# Patient Record
Sex: FEMALE | Race: WHITE | NOT HISPANIC OR LATINO | Employment: PART TIME | URBAN - METROPOLITAN AREA
[De-identification: names, ages, dates, MRNs, and addresses within clinical notes are randomized per-mention and may not be internally consistent; named-entity substitution may affect disease eponyms.]

---

## 2017-01-06 ENCOUNTER — HOSPITAL ENCOUNTER (EMERGENCY)
Facility: CLINIC | Age: 37
Discharge: HOME | End: 2017-01-06
Attending: EMERGENCY MEDICINE | Admitting: EMERGENCY MEDICINE
Payer: COMMERCIAL

## 2017-01-06 ENCOUNTER — APPOINTMENT (OUTPATIENT)
Dept: ULTRASOUND IMAGING | Facility: CLINIC | Age: 37
End: 2017-01-06
Attending: EMERGENCY MEDICINE
Payer: COMMERCIAL

## 2017-01-06 VITALS
SYSTOLIC BLOOD PRESSURE: 101 MMHG | BODY MASS INDEX: 32.76 KG/M2 | DIASTOLIC BLOOD PRESSURE: 63 MMHG | HEART RATE: 63 BPM | OXYGEN SATURATION: 98 % | HEIGHT: 62 IN | WEIGHT: 178 LBS | RESPIRATION RATE: 16 BRPM | TEMPERATURE: 97.6 F

## 2017-01-06 DIAGNOSIS — R39.15 URINARY URGENCY: ICD-10-CM

## 2017-01-06 DIAGNOSIS — R10.2 PELVIC PAIN IN FEMALE: ICD-10-CM

## 2017-01-06 DIAGNOSIS — N88.8 NABOTHIAN CYST: ICD-10-CM

## 2017-01-06 LAB
ALBUMIN UR-MCNC: NEGATIVE MG/DL
ANION GAP SERPL CALCULATED.3IONS-SCNC: 7 MMOL/L (ref 3–14)
APPEARANCE UR: CLEAR
BACTERIA #/AREA URNS HPF: ABNORMAL /HPF
BASOPHILS # BLD AUTO: 0 10E9/L (ref 0–0.2)
BASOPHILS NFR BLD AUTO: 0.5 %
BILIRUB UR QL STRIP: NEGATIVE
BUN SERPL-MCNC: 11 MG/DL (ref 7–30)
CALCIUM SERPL-MCNC: 8.6 MG/DL (ref 8.5–10.1)
CHLORIDE SERPL-SCNC: 105 MMOL/L (ref 94–109)
CO2 SERPL-SCNC: 28 MMOL/L (ref 20–32)
COLOR UR AUTO: YELLOW
CREAT SERPL-MCNC: 0.62 MG/DL (ref 0.52–1.04)
DIFFERENTIAL METHOD BLD: NORMAL
EOSINOPHIL # BLD AUTO: 0.2 10E9/L (ref 0–0.7)
EOSINOPHIL NFR BLD AUTO: 2.8 %
ERYTHROCYTE [DISTWIDTH] IN BLOOD BY AUTOMATED COUNT: 12.2 % (ref 10–15)
GFR SERPL CREATININE-BSD FRML MDRD: ABNORMAL ML/MIN/1.7M2
GLUCOSE SERPL-MCNC: 113 MG/DL (ref 70–99)
GLUCOSE UR STRIP-MCNC: NEGATIVE MG/DL
HCG UR QL: NEGATIVE
HCT VFR BLD AUTO: 40.3 % (ref 35–47)
HGB BLD-MCNC: 13.5 G/DL (ref 11.7–15.7)
HGB UR QL STRIP: NEGATIVE
IMM GRANULOCYTES # BLD: 0 10E9/L (ref 0–0.4)
IMM GRANULOCYTES NFR BLD: 0.2 %
KETONES UR STRIP-MCNC: NEGATIVE MG/DL
LEUKOCYTE ESTERASE UR QL STRIP: NEGATIVE
LYMPHOCYTES # BLD AUTO: 2.2 10E9/L (ref 0.8–5.3)
LYMPHOCYTES NFR BLD AUTO: 25.6 %
MCH RBC QN AUTO: 30.1 PG (ref 26.5–33)
MCHC RBC AUTO-ENTMCNC: 33.5 G/DL (ref 31.5–36.5)
MCV RBC AUTO: 90 FL (ref 78–100)
MICRO REPORT STATUS: NORMAL
MONOCYTES # BLD AUTO: 0.6 10E9/L (ref 0–1.3)
MONOCYTES NFR BLD AUTO: 7 %
MUCOUS THREADS #/AREA URNS LPF: PRESENT /LPF
NEUTROPHILS # BLD AUTO: 5.5 10E9/L (ref 1.6–8.3)
NEUTROPHILS NFR BLD AUTO: 63.9 %
NITRATE UR QL: NEGATIVE
NRBC # BLD AUTO: 0 10*3/UL
NRBC BLD AUTO-RTO: 0 /100
PH UR STRIP: 5.5 PH (ref 5–7)
PLATELET # BLD AUTO: 288 10E9/L (ref 150–450)
POTASSIUM SERPL-SCNC: 3.7 MMOL/L (ref 3.4–5.3)
RBC # BLD AUTO: 4.49 10E12/L (ref 3.8–5.2)
RBC #/AREA URNS AUTO: <1 /HPF (ref 0–2)
SODIUM SERPL-SCNC: 140 MMOL/L (ref 133–144)
SP GR UR STRIP: 1.02 (ref 1–1.03)
SPECIMEN SOURCE: NORMAL
SQUAMOUS #/AREA URNS AUTO: 2 /HPF (ref 0–1)
URN SPEC COLLECT METH UR: ABNORMAL
UROBILINOGEN UR STRIP-MCNC: NORMAL MG/DL (ref 0–2)
WBC # BLD AUTO: 8.7 10E9/L (ref 4–11)
WBC #/AREA URNS AUTO: 1 /HPF (ref 0–2)
WET PREP SPEC: NORMAL

## 2017-01-06 PROCEDURE — 85025 COMPLETE CBC W/AUTO DIFF WBC: CPT | Performed by: EMERGENCY MEDICINE

## 2017-01-06 PROCEDURE — 80048 BASIC METABOLIC PNL TOTAL CA: CPT | Performed by: EMERGENCY MEDICINE

## 2017-01-06 PROCEDURE — 96374 THER/PROPH/DIAG INJ IV PUSH: CPT

## 2017-01-06 PROCEDURE — 96361 HYDRATE IV INFUSION ADD-ON: CPT

## 2017-01-06 PROCEDURE — 99284 EMERGENCY DEPT VISIT MOD MDM: CPT | Mod: 25

## 2017-01-06 PROCEDURE — 93976 VASCULAR STUDY: CPT | Mod: XS

## 2017-01-06 PROCEDURE — 25000128 H RX IP 250 OP 636: Performed by: EMERGENCY MEDICINE

## 2017-01-06 PROCEDURE — 25000125 ZZHC RX 250: Performed by: EMERGENCY MEDICINE

## 2017-01-06 PROCEDURE — 87210 SMEAR WET MOUNT SALINE/INK: CPT | Performed by: EMERGENCY MEDICINE

## 2017-01-06 PROCEDURE — 81025 URINE PREGNANCY TEST: CPT | Performed by: EMERGENCY MEDICINE

## 2017-01-06 PROCEDURE — 81001 URINALYSIS AUTO W/SCOPE: CPT | Performed by: EMERGENCY MEDICINE

## 2017-01-06 PROCEDURE — 87591 N.GONORRHOEAE DNA AMP PROB: CPT | Performed by: EMERGENCY MEDICINE

## 2017-01-06 PROCEDURE — 87491 CHLMYD TRACH DNA AMP PROBE: CPT | Performed by: EMERGENCY MEDICINE

## 2017-01-06 RX ORDER — LIDOCAINE 40 MG/G
CREAM TOPICAL
Status: DISCONTINUED | OUTPATIENT
Start: 2017-01-06 | End: 2017-01-06 | Stop reason: HOSPADM

## 2017-01-06 RX ORDER — KETOROLAC TROMETHAMINE 30 MG/ML
30 INJECTION, SOLUTION INTRAMUSCULAR; INTRAVENOUS ONCE
Status: COMPLETED | OUTPATIENT
Start: 2017-01-06 | End: 2017-01-06

## 2017-01-06 RX ADMIN — KETOROLAC TROMETHAMINE 30 MG: 30 INJECTION, SOLUTION INTRAMUSCULAR at 17:38

## 2017-01-06 RX ADMIN — SODIUM CHLORIDE 1000 ML: 9 INJECTION, SOLUTION INTRAVENOUS at 17:38

## 2017-01-06 ASSESSMENT — ENCOUNTER SYMPTOMS
NAUSEA: 1
CHILLS: 0
DYSURIA: 1
BACK PAIN: 1
COUGH: 0
DIARRHEA: 0
VOMITING: 0
FEVER: 0
CONSTIPATION: 1
ABDOMINAL PAIN: 1

## 2017-01-06 NOTE — ED AVS SNAPSHOT
[facility] Emergency Department    [address] [place] [address]    Phone:  [phone]    Fax:  [phone]                                       Amy Clemens   MRN: [id number]    Department:  [facility] Emergency Department   Date of Visit:  1/6/2017           Patient Information     Date Of Birth          1980        Your diagnoses for this visit were:     Pelvic pain in female     Urinary urgency     Nabothian cyst        You were seen by Ernesto Helton MD.      Follow-up Information     Follow up with your OB. Schedule an appointment as soon as possible for a visit in 3 days.    Why:  as scheduled        Discharge Instructions       Discharge Instructions  Abdominal Pain    Abdominal pain can be caused by many things. Your evaluation today does not show the exact cause for your pain. Your doctor today has decided that it is unlikely your pain is due to a life threatening problem, or a problem requiring surgery or hospital admission. Sometimes those problems cannot be found right away, so it is very important that you follow up as directed.  Sometimes only the changes which occur over time allow the cause of your pain to be found.    Return to the Emergency Department for a recheck in 8-12 hours if your pain continues.  If your pain gets worse, changes in location, or feels different, return to the Emergency Department right away.    ADULTS:  Return to the Emergency Department right away if:      You get an oral temperature above 102oF or as directed by your doctor.    You have blood in your stools (bright red or black, tarry stools).    You keep throwing up or can t drink liquids.    You see blood when you throw up.    You can t have a bowel movement or you can t pass gas.    Your stomach gets bloated or bigger.    Your skin or the whites of your eyes look yellow.    You faint.    You have bloody, frequent or painful urination.    You have new  symptoms or anything that worries you.    CHILDREN:  Return to the Emergency Department right away if your child has any of the above-listed symptoms or the following:      Pushes your hand away or screams/cries when his/her belly is touched.    You notice your child is very fussy or weak.    Your child is very tired and is too tired to eat or drink.    Your child is dehydrated.  Signs of dehydration can be:  o Your infant has had no wet diapers in 4-5 hours.  o Your older child has not passed urine in 6-8 hours.  o Your infant or child starts to have dry mouth and lips, or no saliva or tears.    PREGNANT WOMEN:  Return to the Emergency Department right away if you have any of the above-listed symptoms or the following:      You have bleeding, leaking fluid or passing tissue from the vagina.    You have worse pain or cramping, or pain in your shoulder or back.    You have vomiting that will not stop.    You have painful or bloody urination.    You have a temperature of 100oF or more.    Your baby is not moving as much as usual.    You faint.    You get a bad headache with or without eye problems and abdominal pain.    You have a convulsion or seizure.    You have unusual discharge from your vagina and abdominal pain.    Abdominal pain is pretty common during pregnancy.  Your pain may or may not be related to your pregnancy. You should follow-up closely with your OB doctor so they can evaluate you and your baby.  Until you follow-up with your regular doctor, do the following:       Avoid sex and do not put anything in your vagina.    Drink clear fluids.    Only take medications approved by your doctor.    MORE INFORMATION:    Appendicitis:  A possible cause of abdominal pain in any person who still has their appendix is acute appendicitis. Appendicitis is often hard to diagnose.  Testing does not always rule out early appendicitis or other causes of abdominal pain. Close follow-up with your doctor and re-evaluations  "may be needed to figure out the reason for your abdominal pain.    Follow-up:  It is very important that you make an appointment with your clinic and go to the appointment.  If you do not follow-up with your primary doctor, it may result in missing an important development which could result in permanent injury or disability and/or lasting pain.  If there is any problem keeping your appointment, call your doctor or return to the Emergency Department.    Medications:  Take your medications as directed by your doctor today.  Before using over-the-counter medications, ask your doctor and make sure to take the medications as directed.  If you have any questions about medications, ask your doctor.    Diet:  Resume your normal diet as much as possible, but do not eat fried, fatty or spicy foods while you have pain.  Do not drink alcohol or have caffeine.  Do not smoke tobacco.    Probiotics: If you have been given an antibiotic, you may want to also take a probiotic pill or eat yogurt with live cultures. Probiotics have \"good bacteria\" to help your intestines stay healthy. Studies have shown that probiotics help prevent diarrhea and other intestine problems (including C. diff infection) when you take antibiotics. You can buy these without a prescription in the pharmacy section of the store.     If you were given a prescription for medicine here today, be sure to read all of the information (including the package insert) that comes with your prescription.  This will include important information about the medicine, its side effects, and any warnings that you need to know about.  The pharmacist who fills the prescription can provide more information and answer questions you may have about the medicine.  If you have questions or concerns that the pharmacist cannot address, please call or return to the Emergency Department.         Opioid Medication Information    Pain medications are among the most commonly prescribed " medicines, so we are including this information for all our patients. If you did not receive pain medication or get a prescription for pain medicine, you can ignore it.     You may have been given a prescription for an opioid (narcotic) pain medicine and/or have received a pain medicine while here in the Emergency Department. These medicines can make you drowsy or impaired. You must not drive, operate dangerous equipment, or engage in any other dangerous activities while taking these medications. If you drive while taking these medications, you could be arrested for DUI, or driving under the influence. Do not drink any alcohol while you are taking these medications.     Opioid pain medications can cause addiction. If you have a history of chemical dependency of any type, you are at a higher risk of becoming addicted to pain medications.  Only take these prescribed medications to treat your pain when all other options have been tried. Take it for as short a time and as few doses as possible. Store your pain pills in a secure place, as they are frequently stolen and provide a dangerous opportunity for children or visitors in your house to start abusing these powerful medications. We will not replace any lost or stolen medicine.  As soon as your pain is better, you should flush all your remaining medication.     Many prescription pain medications contain Tylenol  (acetaminophen), including Vicodin , Tylenol #3 , Norco , Lortab , and Percocet .  You should not take any extra pills of Tylenol  if you are using these prescription medications or you can get very sick.  Do not ever take more than 3000 mg of acetaminophen in any 24 hour period.    All opioids tend to cause constipation. Drink plenty of water and eat foods that have a lot of fiber, such as fruits, vegetables, prune juice, apple juice and high fiber cereal.  Take a laxative if you don t move your bowels at least every other day. Miralax , Milk of Magnesia,  Colace , or Senna  can be used to keep you regular.      Remember that you can always come back to the Emergency Department if you are not able to see your regular doctor in the amount of time listed above, if you get any new symptoms, or if there is anything that worries you.          24 Hour Appointment Hotline       To make an appointment at any [facility], call [phone] ([phone]). If you don't have a family doctor or clinic, we will help you find one. [facility] are conveniently located to serve the needs of you and your family.             Review of your medicines      Our records show that you are taking the medicines listed below. If these are incorrect, please call your family doctor or clinic.        Dose / Directions Last dose taken    pantoprazole 40 MG EC tablet   Commonly known as:  PROTONIX   Dose:  40 mg        40 mg daily   Refills:  2                Procedures and tests performed during your visit     Basic metabolic panel    CBC with platelets differential    Chlamydia trachomatis PCR    HCG qualitative urine    NO Rho (D) immune globulin (RhoGam) needed - NOT obstetric patient    Neisseria gonorrhoea PCR    Peripheral IV: Standard    Prep for procedure - pelvic exam    UA with Microscopic    US Pelvic Complete w Transvaginal & Abd/Pel Duplex Limited    Vital signs    Wet prep      Orders Needing Specimen Collection     None      Pending Results     Date and Time Order Name Status Description    1/6/2017 1720 US Pelvic Complete w Transvaginal & Abd/Pel Duplex Limited Preliminary     1/6/2017 1720 Neisseria gonorrhoea PCR In process     1/6/2017 1720 Chlamydia trachomatis PCR In process             Pending Culture Results     Date and Time Order Name Status Description    1/6/2017 1720 Neisseria gonorrhoea PCR In process     1/6/2017 1720 Chlamydia trachomatis PCR In process        Test Results from your hospital stay           1/6/2017  5:24 PM - Interface, Flexilab Results       Component Results     Component Value Ref Range & Units Status    Color Urine Yellow  Final    Appearance Urine Clear  Final    Glucose Urine Negative NEG mg/dL Final    Bilirubin Urine Negative NEG Final    Ketones Urine Negative NEG mg/dL Final    Specific Gravity Urine 1.016 1.003 - 1.035 Final    Blood Urine Negative NEG Final    pH Urine 5.5 5.0 - 7.0 pH Final    Protein Albumin Urine Negative NEG mg/dL Final    Urobilinogen mg/dL Normal 0.0 - 2.0 mg/dL Final    Nitrite Urine Negative NEG Final    Leukocyte Esterase Urine Negative NEG Final    Source Midstream Urine  Final    WBC Urine 1 0 - 2 /HPF Final    RBC Urine <1 0 - 2 /HPF Final    Bacteria Urine Few (A) NEG /HPF Final    Squamous Epithelial /HPF Urine 2 (H) 0 - 1 /HPF Final    Mucous Urine Present (A) NEG /LPF Final         1/6/2017  5:20 PM - Interface, Flexilab Results      Component Results     Component Value Ref Range & Units Status    HCG Qual Urine Negative NEG Final         1/6/2017  6:36 PM - Interface, Flexilab Results      Component Results     Component    Specimen Description    Vagina    Wet Prep    No clue cells seen  No Trichomonas seen  No yeast seen  Few WBC'S seen      Micro Report Status    FINAL 01/06/2017 1/6/2017  6:30 PM - Interface, Flexilab Results         1/6/2017  6:30 PM - Interface, Flexilab Results         1/6/2017  5:46 PM - Interface, Flexilab Results      Component Results     Component Value Ref Range & Units Status    WBC 8.7 4.0 - 11.0 10e9/L Final    RBC Count 4.49 3.8 - 5.2 10e12/L Final    Hemoglobin 13.5 11.7 - 15.7 g/dL Final    Hematocrit 40.3 35.0 - 47.0 % Final    MCV 90 78 - 100 fl Final    MCH 30.1 26.5 - 33.0 pg Final    MCHC 33.5 31.5 - 36.5 g/dL Final    RDW 12.2 10.0 - 15.0 % Final    Platelet Count 288 150 - 450 10e9/L Final    Diff Method Automated Method  Final    % Neutrophils 63.9 % Final    % Lymphocytes 25.6 % Final    % Monocytes 7.0 % Final    % Eosinophils 2.8 % Final    %  Basophils 0.5 % Final    % Immature Granulocytes 0.2 % Final    Nucleated RBCs 0 0 /100 Final    Absolute Neutrophil 5.5 1.6 - 8.3 10e9/L Final    Absolute Lymphocytes 2.2 0.8 - 5.3 10e9/L Final    Absolute Monocytes 0.6 0.0 - 1.3 10e9/L Final    Absolute Eosinophils 0.2 0.0 - 0.7 10e9/L Final    Absolute Basophils 0.0 0.0 - 0.2 10e9/L Final    Abs Immature Granulocytes 0.0 0 - 0.4 10e9/L Final    Absolute Nucleated RBC 0.0  Final         1/6/2017  7:34 PM - Interface, Radiant Ib      Narrative     US PELVIS COMPLETE WITH TRANSVAGINAL AND DOPPLER LIMITED  1/6/2017  7:31 PM     HISTORY: Abdominal pain.    COMPARISON: None.    FINDINGS: Transvaginal and endovaginal scan due to inadequate bladder  fullness. The endometrium is 7 mm in thickness, which is normal.  Uterus measures 8.2 x 5.4 x 4.4 cm, which is normal. There is a 1.7 cm  nabothian cyst.    Right ovary appears normal with normal arterial and venous blood flow.    Left ovary appears normal with normal arterial and venous blood flow.    No free fluid in the cul-de-sac.    No adnexal masses.        Impression     IMPRESSION: Negative.         1/6/2017  6:40 PM - Interface, Flexilab Results      Component Results     Component Value Ref Range & Units Status    Sodium 140 133 - 144 mmol/L Final    Potassium 3.7 3.4 - 5.3 mmol/L Final    Chloride 105 94 - 109 mmol/L Final    Carbon Dioxide 28 20 - 32 mmol/L Final    Anion Gap 7 3 - 14 mmol/L Final    Glucose 113 (H) 70 - 99 mg/dL Final    Urea Nitrogen 11 7 - 30 mg/dL Final    Creatinine 0.62 0.52 - 1.04 mg/dL Final    GFR Estimate >90  Non  GFR Calc   >60 mL/min/1.7m2 Final    GFR Estimate If Black >90   GFR Calc   >60 mL/min/1.7m2 Final    Calcium 8.6 8.5 - 10.1 mg/dL Final                Clinical Quality Measure: Blood Pressure Screening     Your blood pressure was checked while you were in the emergency department today. The last reading we obtained was  BP: 100/57 mmHg . Please  "read the guidelines below about what these numbers mean and what you should do about them.  If your systolic blood pressure (the top number) is less than 120 and your diastolic blood pressure (the bottom number) is less than 80, then your blood pressure is normal. There is nothing more that you need to do about it.  If your systolic blood pressure (the top number) is 120-139 or your diastolic blood pressure (the bottom number) is 80-89, your blood pressure may be higher than it should be. You should have your blood pressure rechecked within a year by a primary care provider.  If your systolic blood pressure (the top number) is 140 or greater or your diastolic blood pressure (the bottom number) is 90 or greater, you may have high blood pressure. High blood pressure is treatable, but if left untreated over time it can put you at risk for heart attack, stroke, or kidney failure. You should have your blood pressure rechecked by a primary care provider within the next 4 weeks.  If your provider in the emergency department today gave you specific instructions to follow-up with your doctor or provider even sooner than that, you should follow that instruction and not wait for up to 4 weeks for your follow-up visit.        Thank you for choosing [facility]       Thank you for choosing [facility] for your care. Our goal is always to provide you with excellent care. Hearing back from our patients is one way we can continue to improve our services. Please take a few minutes to complete the written survey that you may receive in the mail after you visit with us. Thank you!        [employer] Information     [employer] lets you send messages to your doctor, view your test results, renew your prescriptions, schedule appointments and more. To sign up, go to [email or web address] . Click on \"Log in\" on the left side of the screen, which will take you to the Welcome page. Then click on \"Sign up Now\" on the right side of the page.     You " will be asked to enter the access code listed below, as well as some personal information. Please follow the directions to create your username and password.     Your access code is: [email or web address]  Expires: 2017  7:45 PM     Your access code will  in 90 days. If you need help or a new code, please call your [facility] clinic or [phone].        Care EveryWhere ID     This is your Care EveryWhere ID. This could be used by other organizations to access your [facility] medical records  [id number]        After Visit Summary       This is your record. Keep this with you and show to your community pharmacist(s) and doctor(s) at your next visit.

## 2017-01-06 NOTE — ED AVS SNAPSHOT
[facility] Emergency Department    [address]    [facility] [address]    Phone:  [phone]    Fax:  [phone]                                       Amy Clemens   MRN: [id number]    Department:  [facility] Emergency Department   Date of Visit:  1/6/2017           After Visit Summary Signature Page     I have received my discharge instructions, and my questions have been answered. I have discussed any challenges I see with this plan with the nurse or doctor.    ..........................................................................................................................................  Patient/Patient Representative Signature      ..........................................................................................................................................  Patient Representative Print Name and Relationship to Patient    ..................................................               ................................................  Date                                            Time    ..........................................................................................................................................  Reviewed by Signature/Title    ...................................................              ..............................................  Date                                                            Time

## 2017-01-06 NOTE — ED NOTES
Pelvic pain and burning since Monday.  Was seen at clinic on Wednesday.  Told she was constipated and urine was normal.  Has had BM since.  Increased pain.  ABCDs intact.

## 2017-01-06 NOTE — ED PROVIDER NOTES
History   Chief Complaint:  Pelvic pain    HPI   Amy Clemens is a 36 year old female with history of ovarian cysts who presents to the emergency department today for evaluation of pelvic pain. Ms. Clemens reports  pelvic pressure with urgency and burning similar to when she was pregnant for the last 4 days. No reported injuries or trauma, but notes her normal menstrual period ended the day prior. Of note, patient had similar symptoms back in February 2016, but states the pain is different from now and was diagnosed with an ovarian cyst. Patient went to clinic 2 days ago and underwent pelvic x-ray and urine analysis without undergoing a pelvic exam, but was discharged home after negative findings with suppositories. Today, she reports generalized abdominal pressure causing constipation and back pain.  She states the pain worsens when she cough, strains, or calls out. Due to continual pain, patient came in for further evaluation.  Here, patient notes she has been having normal bowel movements with last reported earlier today after taking suppositories, but continued to complain of constipation with nausea. She denies fever, chills, cough, diarrhea, or vaginal bleeding/discharge.     XR Abdomen Flat/.KUB 1 view from clinic on 1/4/17:  Moderate amount of stool in the right colon. Otherwise unremarkable.  Final reading per radiology.     UA result from 1/4/17: Blood small (A), Bacteria (A). WBC/HPF 0-2 RBC/HPF 0-2.     Allergies:  NKDA    Medications:   Pantoprazole (Protonix) 40 Mg Enteric Coated Tablet  Multiple Vitamins-Minerals (Womens Multi Po)  Vitamin D, Cholecalciferol, Po    Past Medical History:    Tubal occlusion   Ovarian cysts    Past Surgical History:    Cholecystectomy  Gyn surgery     Family History:    Hyperlipemia  Diabetes  Uterine cancer  Cerebrovascular disease     Social History:  Marital Status:   [2]  Tobacco: Negative  Alcohol: Negative  Presents alone.   PC[facility]: [facility]   Constitutional: Negative for fever and chills.   Respiratory: Negative for cough.    Gastrointestinal: Positive for nausea, abdominal pain and constipation. Negative for vomiting and diarrhea.   Genitourinary: Positive for dysuria, urgency and pelvic pain. Negative for vaginal bleeding and vaginal discharge.   Musculoskeletal: Positive for back pain.   All other systems reviewed and are negative.    Physical Exam   First Vitals:     BP: 136.68  Pulse: 96  Resp: 18  SpO2: 99 %   Temp: 97.6 F    Physical Exam  General: The patient is alert, in no respiratory distress.    HENT: Mucous membranes moist.    Cardiovascular: Regular rate and rhythm. Good pulses in all four extremities. Normal capillary refill and skin turgor.     Respiratory: Lungs are clear. No nasal flaring. No retractions. No wheezing, no crackles.    Gastrointestinal: Discomfort with palpitation across the abdomen causing suprapubic pressure.  No guarding, no rebound. No palpable hernias.     Genitourinary/Pelvic Exam: Small amount if discharge. No CMT.     Musculoskeletal: No gross deformity.     Skin: No rashes or petechiae.     Neurologic: The patient is alert and oriented x3. GCS 15. No testable cranial nerve deficit. Follows commands with clear and appropriate speech. Gives appropriate answers. Good strength in all extremities. No gross neurologic deficit. Gross sensation intact. Pupils are round and reactive. No meningismus.     Lymphatic: No cervical adenopathy. No lower extremity swelling.    Psychiatric: The patient is non-tearful.  Emergency Department Course   Imaging:  Radiographic findings were communicated with the patient who voiced understanding of the findings.    US Pelvic Complete with Transvaginal & Abdomen / Pelvis Duplex Limited:  Negative  Preliminary  reading per radiology.    Laboratory:  CBC:  WBC 8.7, HGB 13.5, , otherwise WNL  BMP: Glucose 113 (H) o/w WNL. (Creatinine 0.62)    UA: Bacteria Few (A), SE/HPF  2 (H), Mucous present (A). WBC/HPF 1, RBC/HPF <1.   Wet prep: Negative.   HCG Qualitative urine: Negative.  Chlamydia trachomatis PCR pending.   Neisseria gonorrhea PCR pending.     Interventions:  17:38 Toradol 30mg IV  17:38 Normal saline 1,000mL IV     Emergency Department Course:  16:54 Nursing notes and vitals reviewed. I performed an exam of the patient as documented above. GCS 15. I will perform a pelvic exam.     17:05 Urine collected.  This was sent to the lab for further testing, results above.    17:30 Blood drawn. This was sent to the lab for further testing, results above.    17:38 A peripheral IV was established.    17:43 Recheck patient. I performed a pelvic exam. Please refer to results above on my physical exam.     19:03 The patient was taken for ultrasound, see imaging results above.     19:40 Reheck patient. I personally reviewed the laboratory and imaigng results with the patient. Patient reports improvement in symptoms and will be discharged home.     Impression & Plan    Medical Decision Making:  Amy Clemens is a 36 year old female has been seen in February this year and complained of pelvic pain, but reports that actually was different and at that time was diagnosed with cyst. The patient states she coughs, strains, or yells, she has pain in pelvic region. It feels like a pressure. I did consider pelvic floor dysfunction, EUTI, kidney stone, intraabdominal causes such as hernia, ovarian cyst, pelvic infection, tumor and abscess. Ultrasound did demonstrate nabothian cyst which maybe a cause of this. There are no signs of UTI. She is not pregnant. She has follow up scheduled ion 3 days with OB days and I recommended that she take Ibuprofen until that time. I do have cultures that Aare pending are on her, but otherwise her labs are reassuring. She did not require CT imaging due to the low likelihood this could be surgical condition. She was discharged in good condition and feeling rather  comfortable.     Diagnosis:    ICD-10-CM    1. Pelvic pain in female R10.2    2. Urinary urgency R39.15    3. Nabothian cyst N88.8        Disposition:  discharged to home    Scribe Disclosure:  I, Keily Acosta, am serving as a scribe at 4:54 PM on 1/6/2017 to document services personally performed by Ernesto Helton MD, based on my observations and the provider's statements to me.  Keily Acosta  1/6/2017   [facility] EMERGENCY DEPARTMENT        Ernesto Helton MD  01/06/17 [id number]

## 2017-01-07 NOTE — DISCHARGE INSTRUCTIONS
Discharge Instructions  Abdominal Pain    Abdominal pain can be caused by many things. Your evaluation today does not show the exact cause for your pain. Your doctor today has decided that it is unlikely your pain is due to a life threatening problem, or a problem requiring surgery or hospital admission. Sometimes those problems cannot be found right away, so it is very important that you follow up as directed.  Sometimes only the changes which occur over time allow the cause of your pain to be found.    Return to the Emergency Department for a recheck in 8-12 hours if your pain continues.  If your pain gets worse, changes in location, or feels different, return to the Emergency Department right away.    ADULTS:  Return to the Emergency Department right away if:      You get an oral temperature above 102oF or as directed by your doctor.    You have blood in your stools (bright red or black, tarry stools).    You keep throwing up or can t drink liquids.    You see blood when you throw up.    You can t have a bowel movement or you can t pass gas.    Your stomach gets bloated or bigger.    Your skin or the whites of your eyes look yellow.    You faint.    You have bloody, frequent or painful urination.    You have new symptoms or anything that worries you.    CHILDREN:  Return to the Emergency Department right away if your child has any of the above-listed symptoms or the following:      Pushes your hand away or screams/cries when his/her belly is touched.    You notice your child is very fussy or weak.    Your child is very tired and is too tired to eat or drink.    Your child is dehydrated.  Signs of dehydration can be:  o Your infant has had no wet diapers in 4-5 hours.  o Your older child has not passed urine in 6-8 hours.  o Your infant or child starts to have dry mouth and lips, or no saliva or tears.    PREGNANT WOMEN:  Return to the Emergency Department right away if you have any of the above-listed symptoms or  the following:      You have bleeding, leaking fluid or passing tissue from the vagina.    You have worse pain or cramping, or pain in your shoulder or back.    You have vomiting that will not stop.    You have painful or bloody urination.    You have a temperature of 100oF or more.    Your baby is not moving as much as usual.    You faint.    You get a bad headache with or without eye problems and abdominal pain.    You have a convulsion or seizure.    You have unusual discharge from your vagina and abdominal pain.    Abdominal pain is pretty common during pregnancy.  Your pain may or may not be related to your pregnancy. You should follow-up closely with your OB doctor so they can evaluate you and your baby.  Until you follow-up with your regular doctor, do the following:       Avoid sex and do not put anything in your vagina.    Drink clear fluids.    Only take medications approved by your doctor.    MORE INFORMATION:    Appendicitis:  A possible cause of abdominal pain in any person who still has their appendix is acute appendicitis. Appendicitis is often hard to diagnose.  Testing does not always rule out early appendicitis or other causes of abdominal pain. Close follow-up with your doctor and re-evaluations may be needed to figure out the reason for your abdominal pain.    Follow-up:  It is very important that you make an appointment with your clinic and go to the appointment.  If you do not follow-up with your primary doctor, it may result in missing an important development which could result in permanent injury or disability and/or lasting pain.  If there is any problem keeping your appointment, call your doctor or return to the Emergency Department.    Medications:  Take your medications as directed by your doctor today.  Before using over-the-counter medications, ask your doctor and make sure to take the medications as directed.  If you have any questions about medications, ask your doctor.    Diet:   "Resume your normal diet as much as possible, but do not eat fried, fatty or spicy foods while you have pain.  Do not drink alcohol or have caffeine.  Do not smoke tobacco.    Probiotics: If you have been given an antibiotic, you may want to also take a probiotic pill or eat yogurt with live cultures. Probiotics have \"good bacteria\" to help your intestines stay healthy. Studies have shown that probiotics help prevent diarrhea and other intestine problems (including C. diff infection) when you take antibiotics. You can buy these without a prescription in the pharmacy section of the store.     If you were given a prescription for medicine here today, be sure to read all of the information (including the package insert) that comes with your prescription.  This will include important information about the medicine, its side effects, and any warnings that you need to know about.  The pharmacist who fills the prescription can provide more information and answer questions you may have about the medicine.  If you have questions or concerns that the pharmacist cannot address, please call or return to the Emergency Department.         Opioid Medication Information    Pain medications are among the most commonly prescribed medicines, so we are including this information for all our patients. If you did not receive pain medication or get a prescription for pain medicine, you can ignore it.     You may have been given a prescription for an opioid (narcotic) pain medicine and/or have received a pain medicine while here in the Emergency Department. These medicines can make you drowsy or impaired. You must not drive, operate dangerous equipment, or engage in any other dangerous activities while taking these medications. If you drive while taking these medications, you could be arrested for DUI, or driving under the influence. Do not drink any alcohol while you are taking these medications.     Opioid pain medications can cause " addiction. If you have a history of chemical dependency of any type, you are at a higher risk of becoming addicted to pain medications.  Only take these prescribed medications to treat your pain when all other options have been tried. Take it for as short a time and as few doses as possible. Store your pain pills in a secure place, as they are frequently stolen and provide a dangerous opportunity for children or visitors in your house to start abusing these powerful medications. We will not replace any lost or stolen medicine.  As soon as your pain is better, you should flush all your remaining medication.     Many prescription pain medications contain Tylenol  (acetaminophen), including Vicodin , Tylenol #3 , Norco , Lortab , and Percocet .  You should not take any extra pills of Tylenol  if you are using these prescription medications or you can get very sick.  Do not ever take more than 3000 mg of acetaminophen in any 24 hour period.    All opioids tend to cause constipation. Drink plenty of water and eat foods that have a lot of fiber, such as fruits, vegetables, prune juice, apple juice and high fiber cereal.  Take a laxative if you don t move your bowels at least every other day. Miralax , Milk of Magnesia, Colace , or Senna  can be used to keep you regular.      Remember that you can always come back to the Emergency Department if you are not able to see your regular doctor in the amount of time listed above, if you get any new symptoms, or if there is anything that worries you.

## 2017-01-08 LAB
C TRACH DNA SPEC QL NAA+PROBE: NORMAL
N GONORRHOEA DNA SPEC QL NAA+PROBE: NORMAL
SPECIMEN SOURCE: NORMAL
SPECIMEN SOURCE: NORMAL

## 2018-03-11 ENCOUNTER — HOSPITAL ENCOUNTER (EMERGENCY)
Facility: CLINIC | Age: 38
Discharge: HOME | End: 2018-03-11
Attending: EMERGENCY MEDICINE | Admitting: EMERGENCY MEDICINE
Payer: COMMERCIAL

## 2018-03-11 ENCOUNTER — APPOINTMENT (OUTPATIENT)
Dept: GENERAL RADIOLOGY | Facility: CLINIC | Age: 38
End: 2018-03-11
Attending: EMERGENCY MEDICINE
Payer: COMMERCIAL

## 2018-03-11 VITALS
TEMPERATURE: 98.2 F | BODY MASS INDEX: 32.59 KG/M2 | HEIGHT: 64 IN | DIASTOLIC BLOOD PRESSURE: 79 MMHG | OXYGEN SATURATION: 99 % | RESPIRATION RATE: 18 BRPM | WEIGHT: 190.87 LBS | SYSTOLIC BLOOD PRESSURE: 129 MMHG

## 2018-03-11 DIAGNOSIS — R13.10 PAIN WITH SWALLOWING: ICD-10-CM

## 2018-03-11 DIAGNOSIS — R07.9 ACUTE CHEST PAIN: ICD-10-CM

## 2018-03-11 LAB
ALBUMIN SERPL-MCNC: 3.6 G/DL (ref 3.4–5)
ALP SERPL-CCNC: 80 U/L (ref 40–150)
ALT SERPL W P-5'-P-CCNC: 27 U/L (ref 0–50)
ANION GAP SERPL CALCULATED.3IONS-SCNC: 5 MMOL/L (ref 3–14)
AST SERPL W P-5'-P-CCNC: 14 U/L (ref 0–45)
BASOPHILS # BLD AUTO: 0 10E9/L (ref 0–0.2)
BASOPHILS NFR BLD AUTO: 0.5 %
BILIRUB DIRECT SERPL-MCNC: <0.1 MG/DL (ref 0–0.2)
BILIRUB SERPL-MCNC: 0.2 MG/DL (ref 0.2–1.3)
BUN SERPL-MCNC: 10 MG/DL (ref 7–30)
CALCIUM SERPL-MCNC: 8.7 MG/DL (ref 8.5–10.1)
CHLORIDE SERPL-SCNC: 105 MMOL/L (ref 94–109)
CO2 SERPL-SCNC: 29 MMOL/L (ref 20–32)
CREAT SERPL-MCNC: 0.64 MG/DL (ref 0.52–1.04)
DIFFERENTIAL METHOD BLD: NORMAL
EOSINOPHIL # BLD AUTO: 0.3 10E9/L (ref 0–0.7)
EOSINOPHIL NFR BLD AUTO: 3.6 %
ERYTHROCYTE [DISTWIDTH] IN BLOOD BY AUTOMATED COUNT: 12.3 % (ref 10–15)
GFR SERPL CREATININE-BSD FRML MDRD: >90 ML/MIN/1.7M2
GLUCOSE SERPL-MCNC: 141 MG/DL (ref 70–99)
HCT VFR BLD AUTO: 39.4 % (ref 35–47)
HGB BLD-MCNC: 13.2 G/DL (ref 11.7–15.7)
IMM GRANULOCYTES # BLD: 0 10E9/L (ref 0–0.4)
IMM GRANULOCYTES NFR BLD: 0.3 %
LIPASE SERPL-CCNC: 164 U/L (ref 73–393)
LYMPHOCYTES # BLD AUTO: 2.1 10E9/L (ref 0.8–5.3)
LYMPHOCYTES NFR BLD AUTO: 28.7 %
MCH RBC QN AUTO: 30.1 PG (ref 26.5–33)
MCHC RBC AUTO-ENTMCNC: 33.5 G/DL (ref 31.5–36.5)
MCV RBC AUTO: 90 FL (ref 78–100)
MONOCYTES # BLD AUTO: 0.5 10E9/L (ref 0–1.3)
MONOCYTES NFR BLD AUTO: 6 %
NEUTROPHILS # BLD AUTO: 4.5 10E9/L (ref 1.6–8.3)
NEUTROPHILS NFR BLD AUTO: 60.9 %
NRBC # BLD AUTO: 0 10*3/UL
NRBC BLD AUTO-RTO: 0 /100
PLATELET # BLD AUTO: 269 10E9/L (ref 150–450)
POTASSIUM SERPL-SCNC: 3.8 MMOL/L (ref 3.4–5.3)
PROT SERPL-MCNC: 7 G/DL (ref 6.8–8.8)
RBC # BLD AUTO: 4.39 10E12/L (ref 3.8–5.2)
SODIUM SERPL-SCNC: 139 MMOL/L (ref 133–144)
TROPONIN I SERPL-MCNC: <0.015 UG/L (ref 0–0.04)
WBC # BLD AUTO: 7.5 10E9/L (ref 4–11)

## 2018-03-11 PROCEDURE — 85025 COMPLETE CBC W/AUTO DIFF WBC: CPT | Performed by: EMERGENCY MEDICINE

## 2018-03-11 PROCEDURE — 80048 BASIC METABOLIC PNL TOTAL CA: CPT | Performed by: EMERGENCY MEDICINE

## 2018-03-11 PROCEDURE — 83690 ASSAY OF LIPASE: CPT | Performed by: EMERGENCY MEDICINE

## 2018-03-11 PROCEDURE — 80076 HEPATIC FUNCTION PANEL: CPT | Performed by: EMERGENCY MEDICINE

## 2018-03-11 PROCEDURE — 93005 ELECTROCARDIOGRAM TRACING: CPT

## 2018-03-11 PROCEDURE — 71046 X-RAY EXAM CHEST 2 VIEWS: CPT

## 2018-03-11 PROCEDURE — 99285 EMERGENCY DEPT VISIT HI MDM: CPT

## 2018-03-11 PROCEDURE — 84484 ASSAY OF TROPONIN QUANT: CPT | Performed by: EMERGENCY MEDICINE

## 2018-03-11 RX ORDER — CALCIUM CARBONATE 500 MG/1
1 TABLET, CHEWABLE ORAL DAILY
COMMUNITY
End: 2018-11-22

## 2018-03-11 NOTE — ED AVS SNAPSHOT
[facility] Emergency Department    [address]    [facility] [address]    Phone:  [phone]    Fax:  [phone]                                       Amy Clemens   MRN: [id number]    Department:  [facility] Emergency Department   Date of Visit:  3/11/2018           After Visit Summary Signature Page     I have received my discharge instructions, and my questions have been answered. I have discussed any challenges I see with this plan with the nurse or doctor.    ..........................................................................................................................................  Patient/Patient Representative Signature      ..........................................................................................................................................  Patient Representative Print Name and Relationship to Patient    ..................................................               ................................................  Date                                            Time    ..........................................................................................................................................  Reviewed by Signature/Title    ...................................................              ..............................................  Date                                                            Time

## 2018-03-11 NOTE — ED AVS SNAPSHOT
[facility] Emergency Department    [address]    [place] [address]    Phone:  [phone]    Fax:  [phone]                                       Amy Clemens   MRN: [id number]    Department:  [facility] Emergency Department   Date of Visit:  3/11/2018           Patient Information     Date Of Birth          1980        Your diagnoses for this visit were:     Acute chest pain     Pain with swallowing        You were seen by Magda Interiano MD.      Follow-up Information     Follow up with Your GI doctor.        Follow up with [facility] Emergency Department.    Specialty:  EMERGENCY MEDICINE    Why:  If symptoms worsen    Contact information:    [address]  [facility] [address] [phone]        Discharge Instructions       Eat a soft diet.  Follow up with your GI doctor.  Discuss symptoms and ask about an upper endoscopy.  Return for severe pain, inability to swallow, drooling, abdominal pain or fevers.  Continue your protonix.   Use tums and maalox as needed.    24 Hour Appointment Hotline       To make an appointment at any [facility] clinic, call [phone] ([phone]). If you don't have a family doctor or clinic, we will help you find one. [facility] clinics are conveniently located to serve the needs of you and your family.             Review of your medicines      Our records show that you are taking the medicines listed below. If these are incorrect, please call your family doctor or clinic.        Dose / Directions Last dose taken    ASPIRIN PO        Refills:  0        calcium carbonate 500 MG chewable tablet   Commonly known as:  TUMS   Dose:  1 chew tab        Take 1 chew tab by mouth daily   Refills:  0        pantoprazole 40 MG EC tablet   Commonly known as:  PROTONIX   Dose:  40 mg        40 mg daily   Refills:  2                Procedures and tests performed during your visit     Basic  metabolic panel    CBC with platelets differential    Hepatic panel    Lipase    Troponin I    XR Chest 2 Views      Orders Needing Specimen Collection     None      Pending Results     No orders found from 3/9/2018 to 3/12/2018.            Pending Culture Results     No orders found from 3/9/2018 to 3/12/2018.            Pending Results Instructions     If you had any lab results that were not finalized at the time of your Discharge, you can call the ED Lab Result RN at [phone]. You will be contacted by this team for any positive Lab results or changes in treatment. The nurses are available 7 days a week from 10A to 6:30P.  You can leave a message 24 hours per day and they will return your call.        Test Results From Your Hospital Stay        3/11/2018  9:07 PM      Component Results     Component Value Ref Range & Units Status    WBC 7.5 4.0 - 11.0 10e9/L Final    RBC Count 4.39 3.8 - 5.2 10e12/L Final    Hemoglobin 13.2 11.7 - 15.7 g/dL Final    Hematocrit 39.4 35.0 - 47.0 % Final    MCV 90 78 - 100 fl Final    MCH 30.1 26.5 - 33.0 pg Final    MCHC 33.5 31.5 - 36.5 g/dL Final    RDW 12.3 10.0 - 15.0 % Final    Platelet Count 269 150 - 450 10e9/L Final    Diff Method Automated Method  Final    % Neutrophils 60.9 % Final    % Lymphocytes 28.7 % Final    % Monocytes 6.0 % Final    % Eosinophils 3.6 % Final    % Basophils 0.5 % Final    % Immature Granulocytes 0.3 % Final    Nucleated RBCs 0 0 /100 Final    Absolute Neutrophil 4.5 1.6 - 8.3 10e9/L Final    Absolute Lymphocytes 2.1 0.8 - 5.3 10e9/L Final    Absolute Monocytes 0.5 0.0 - 1.3 10e9/L Final    Absolute Eosinophils 0.3 0.0 - 0.7 10e9/L Final    Absolute Basophils 0.0 0.0 - 0.2 10e9/L Final    Abs Immature Granulocytes 0.0 0 - 0.4 10e9/L Final    Absolute Nucleated RBC 0.0  Final         3/11/2018  9:29 PM      Component Results     Component Value Ref Range & Units Status    Sodium 139 133 - 144 mmol/L Final    Potassium 3.8 3.4 - 5.3 mmol/L Final     Chloride 105 94 - 109 mmol/L Final    Carbon Dioxide 29 20 - 32 mmol/L Final    Anion Gap 5 3 - 14 mmol/L Final    Glucose 141 (H) 70 - 99 mg/dL Final    Urea Nitrogen 10 7 - 30 mg/dL Final    Creatinine 0.64 0.52 - 1.04 mg/dL Final    GFR Estimate >90 >60 mL/min/1.7m2 Final    Non  GFR Calc    GFR Estimate If Black >90 >60 mL/min/1.7m2 Final    African American GFR Calc    Calcium 8.7 8.5 - 10.1 mg/dL Final         3/11/2018  9:29 PM      Component Results     Component Value Ref Range & Units Status    Troponin I ES <0.015 0.000 - 0.045 ug/L Final    The 99th percentile for upper reference range is 0.045 ug/L.  Troponin values   in the range of 0.045 - 0.120 ug/L may be associated with risks of adverse   clinical events.           3/11/2018  9:30 PM      Narrative     CHEST TWO VIEW   3/11/2018 9:16 PM     HISTORY: Chest pain.     COMPARISON: 1/20/2014    FINDINGS: Heart size normal. Lungs clear. No interval change.        Impression     IMPRESSION: Negative.    KASHIF WELLER MD         3/11/2018  9:56 PM      Component Results     Component Value Ref Range & Units Status    Bilirubin Direct <0.1 0.0 - 0.2 mg/dL Final    Bilirubin Total 0.2 0.2 - 1.3 mg/dL Final    Albumin 3.6 3.4 - 5.0 g/dL Final    Protein Total 7.0 6.8 - 8.8 g/dL Final    Alkaline Phosphatase 80 40 - 150 U/L Final    ALT 27 0 - 50 U/L Final    AST 14 0 - 45 U/L Final         3/11/2018  9:44 PM      Component Results     Component Value Ref Range & Units Status    Lipase 164 73 - 393 U/L Final                Clinical Quality Measure: Blood Pressure Screening     Your blood pressure was checked while you were in the emergency department today. The last reading we obtained was  BP: (!) 102/95 . Please read the guidelines below about what these numbers mean and what you should do about them.  If your systolic blood pressure (the top number) is less than 120 and your diastolic blood pressure (the bottom number) is less than 80,  "then your blood pressure is normal. There is nothing more that you need to do about it.  If your systolic blood pressure (the top number) is 120-139 or your diastolic blood pressure (the bottom number) is 80-89, your blood pressure may be higher than it should be. You should have your blood pressure rechecked within a year by a primary care provider.  If your systolic blood pressure (the top number) is 140 or greater or your diastolic blood pressure (the bottom number) is 90 or greater, you may have high blood pressure. High blood pressure is treatable, but if left untreated over time it can put you at risk for heart attack, stroke, or kidney failure. You should have your blood pressure rechecked by a primary care provider within the next 4 weeks.  If your provider in the emergency department today gave you specific instructions to follow-up with your doctor or provider even sooner than that, you should follow that instruction and not wait for up to 4 weeks for your follow-up visit.        Thank you for choosing [facility]       Thank you for choosing [facility] for your care. Our goal is always to provide you with excellent care. Hearing back from our patients is one way we can continue to improve our services. Please take a few minutes to complete the written survey that you may receive in the mail after you visit with us. Thank you!        [facility] Information     [employer]ufind lets you send messages to your doctor, view your test results, renew your prescriptions, schedule appointments and more. To sign up, go to [email or web address] . Click on \"Log in\" on the left side of the screen, which will take you to the Welcome page. Then click on \"Sign up Now\" on the right side of the page.     You will be asked to enter the access code listed below, as well as some personal information. Please follow the directions to create your username and password.     Your access code is: [email or web address]  Expires: 6/9/2018 10:23 PM     Your " access code will  in 90 days. If you need help or a new code, please call your [place] clinic or [phone].        Care EveryWhere ID     This is your Care EveryWhere ID. This could be used by other organizations to access your [place] medical records  [id number]        Equal Access to Services     YUMIKO ACEVEDO : Eulogio vailo Sonunu, waaxda luqadaha, qaybta kaalmada tawanna, xiao sweet. So [facility] [phone].    ATENCIÓN: Si habla español, tiene a meade disposición servicios gratuitos de asistencia lingüística. Llame al [phone].    We comply with applicable federal civil rights laws and [place] laws. We do not discriminate on the basis of race, color, national origin, age, disability, sex, sexual orientation, or gender identity.            After Visit Summary       This is your record. Keep this with you and show to your community pharmacist(s) and doctor(s) at your next visit.

## 2018-03-12 LAB — INTERPRETATION ECG - MUSE: NORMAL

## 2018-03-12 ASSESSMENT — ENCOUNTER SYMPTOMS
HEMATURIA: 1
FEVER: 0
VOMITING: 1
CHOKING: 0
BLOOD IN STOOL: 0
COUGH: 0
ABDOMINAL PAIN: 0
SHORTNESS OF BREATH: 0

## 2018-03-12 NOTE — ED PROVIDER NOTES
History     Chief Complaint:  Gastrophageal Reflux     HPI   Amy Clemens is a 38 year old female who presents with chest pain.  The patient began having chest pain at 0700 today, and the patient explains that the pain feels like something is stuck in her esophagus, and there is pain when the patient's chest is pressed on.  The patient did say that she vomited today, but denies any belly pain, acid reflux pain, episodes of choking, fever, cough, congestion, history of blood clots, shortness of breath, blood in stool or urine.  The patient says that she has never had an episode like this.  She is able to swallow and is tolerating both solids and liquids.  She takes protonix daily and is compliant.      CARDIAC RISK FACTORS:  Sex:    Female  Tobacco:   No  Hypertension:   No  Hyperlipidemia:  No  Diabetes:   No  Family History:  No    PE/DVT RISK FACTORS:  Sex:    Female  Hormones:   No  Tobacco:   No  Cancer:   No  Travel:   No  Surgery:   No  Other immobilization: No  Personal history:  No  Family history:  No        Allergies:  No known drug allergies    Medications:    Tums  Aspirin  Protonix     Past Medical History:    Gastroesophageal reflux disease  Tubal occlusion    Past Surgical History:    Cholecystectomy  Gyn Surgery    Family History:    History reviewed. No pertinent family history.      Social History:  Smoking Status: Never Smoker  Alcohol Use: No  Patient presents   Marital Status:        Review of Systems   Constitutional: Negative for fever.   HENT: Negative for congestion.    Respiratory: Negative for cough, choking and shortness of breath.    Cardiovascular: Positive for chest pain.   Gastrointestinal: Positive for vomiting. Negative for abdominal pain and blood in stool.   Genitourinary: Positive for hematuria.   All other systems reviewed and are negative.      Physical Exam   First Vitals:    Patient Vitals for the past 24 hrs:   BP Temp Heart Rate Resp SpO2 Height Weight  "  03/11/18 2230 - - - - 99 % - -   03/11/18 2215 129/79 - - - 100 % - -   03/11/18 2200 118/80 - - - 99 % - -   03/11/18 2145 (!) 102/95 - - - 100 % - -   03/11/18 2033 132/65 98.2  F (36.8  C) 85 18 98 % 1.626 m (5' 4\") 86.6 kg (190 lb 14 oz)     Physical Exam  Gen: alert  HEENT: PERRL, oropharynx clear  Neck: normal ROM  CV: RRR, no murmurs, 2+ distal pulses in all 4 extremities  Chest: no tenderness over the chest wall  Pulm: breath sounds equal, lungs clear  Abd: Soft, nontender  Back: no evidence of injury  MSK: no lower extremity edema, no calf tenderness  Skin: no rash  Neuro: alert, appropriate conversation and interaction    Emergency Department Course     ECG (20:24:37):  Rate 73 bpm. WY interval 174. QRS duration 86. QT/QTc 384/423. P-R-T axes 49 49 41. normal sinus rhythm. Normal ECG. Interpreted at 2114 by Dr. Interiano.    Imaging:  Radiographic findings were communicated with the patient who voiced understanding of the findings.     X-ray Chest, 2 views:  Negative  Result per radiology.    Laboratory:  CBC: WNL (WBC 7.5, HGB 13.2, )  BMP: Glucose 141 (H) all others WNL (Creatinine 0.64)  Troponin I <0.015  Hepatic Panel All WNL  Lipase 164    Emergency Department Course:  Past medical records, nursing notes, and vitals reviewed.  2211: I performed an exam of the patient and obtained history, as documented above.   IV inserted and blood drawn.     The patient was sent for a X-ray while in the emergency department, findings above.     2222: I rechecked the patient. Findings and plan explained to the Patient. Patient discharged home with instructions regarding supportive care, medications, and reasons to return. The importance of close follow-up was reviewed.      Impression & Plan      Medical Decision Making:  The patient presented with chest pain.  The EKG showed normal sinus rhythm without ischemic changes.  There was no evidence of acute ischemia.  Initial troponin was negative.  CXR showed no " acute processes to explain the patient's chest pain.  History and exam was not consistent with PE or thoracic aortic dissection.  Laboratory studies are reviewed above.  There was no evidence of a referred intraabdominal process.  Based on age, the characteristics of the pain and cardiac risk factors, the I believe the pain is most consistent with GERD. I do not believe there is an acute underlying cardiac process involving the patient's visit here tonight. I will have her follow up with GI and we discussed strict return precautions. Patient verbalized understanding and agreement with the plan.    Diagnosis:    ICD-10-CM    1. Acute chest pain R07.9    2. Pain with swallowing R13.10        Disposition:  discharged to home    Nathaniel Huang  3/11/2018   [facility] EMERGENCY DEPARTMENT  I, Nathaniel Huang, am serving as a scribe at 10:11 PM on 3/11/2018 to document services personally performed by Magda Interiano based on my observations and the provider's statements to me.        Magda Interiano MD  03/12/18 7600

## 2018-03-12 NOTE — ED NOTES
"Awoke this am with \"Lump in throat\" feeling mid sternal. Pt attempted ASA, tums, self induced vomitting, and ate 2 bananas without relief. ABC in tact. A/OX4.   "

## 2018-03-12 NOTE — DISCHARGE INSTRUCTIONS
Eat a soft diet.  Follow up with your GI doctor.  Discuss symptoms and ask about an upper endoscopy.  Return for severe pain, inability to swallow, drooling, abdominal pain or fevers.  Continue your protonix.   Use tums and maalox as needed.

## 2018-10-08 ENCOUNTER — APPOINTMENT (OUTPATIENT)
Dept: GENERAL RADIOLOGY | Facility: CLINIC | Age: 38
End: 2018-10-08
Attending: EMERGENCY MEDICINE
Payer: COMMERCIAL

## 2018-10-08 ENCOUNTER — HOSPITAL ENCOUNTER (EMERGENCY)
Facility: CLINIC | Age: 38
Discharge: HOME | End: 2018-10-08
Attending: EMERGENCY MEDICINE | Admitting: EMERGENCY MEDICINE
Payer: COMMERCIAL

## 2018-10-08 VITALS
HEIGHT: 62 IN | DIASTOLIC BLOOD PRESSURE: 77 MMHG | HEART RATE: 65 BPM | SYSTOLIC BLOOD PRESSURE: 123 MMHG | TEMPERATURE: 98.1 F | OXYGEN SATURATION: 100 % | BODY MASS INDEX: 33.68 KG/M2 | RESPIRATION RATE: 18 BRPM | WEIGHT: 183 LBS

## 2018-10-08 DIAGNOSIS — S20.212A CONTUSION OF RIB ON LEFT SIDE, INITIAL ENCOUNTER: ICD-10-CM

## 2018-10-08 PROCEDURE — 99283 EMERGENCY DEPT VISIT LOW MDM: CPT

## 2018-10-08 PROCEDURE — 25000132 ZZH RX MED GY IP 250 OP 250 PS 637: Performed by: EMERGENCY MEDICINE

## 2018-10-08 PROCEDURE — 71046 X-RAY EXAM CHEST 2 VIEWS: CPT

## 2018-10-08 RX ORDER — LIDOCAINE 50 MG/G
1 PATCH TOPICAL EVERY 24 HOURS
Qty: 10 PATCH | Refills: 0 | Status: SHIPPED | OUTPATIENT
Start: 2018-10-08 | End: 2019-02-01

## 2018-10-08 RX ORDER — HYDROCODONE BITARTRATE AND ACETAMINOPHEN 5; 325 MG/1; MG/1
1 TABLET ORAL EVERY 6 HOURS PRN
Qty: 8 TABLET | Refills: 0 | Status: SHIPPED | OUTPATIENT
Start: 2018-10-08 | End: 2018-11-22

## 2018-10-08 RX ORDER — LIDOCAINE 4 G/G
1 PATCH TOPICAL ONCE
Status: DISCONTINUED | OUTPATIENT
Start: 2018-10-08 | End: 2018-10-08 | Stop reason: HOSPADM

## 2018-10-08 RX ADMIN — LIDOCAINE 1 PATCH: 560 PATCH PERCUTANEOUS; TOPICAL; TRANSDERMAL at 10:49

## 2018-10-08 ASSESSMENT — ENCOUNTER SYMPTOMS
CONSTIPATION: 0
FEVER: 0
FATIGUE: 1
NAUSEA: 0
APPETITE CHANGE: 1
CHILLS: 0
DIAPHORESIS: 0
VOMITING: 0

## 2018-10-08 NOTE — DISCHARGE INSTRUCTIONS
Please continue to take deep breaths and cough, use incentive spirometry as well.  Avoid splinting on the side of injury.  If you notice increasing pain, cough productive of sputum, fevers, pain elsewhere or other acute changes return to the ED.      May use norco for breakthrough pain but do not drive, drink or operate heavy machinery.    Discharge Instructions  Chest Injury    You have been seen today because of a chest injury.  You may have contusion (bruise) of the chest or a rib fracture (broken bone).  Rib fractures can be hard to see on x-ray, so we cannot always be sure whether your rib is broken or bruised. Fortunately, the treatment of these injuries is usually the same, and includes pain control and preventing complications.    Generally, every Emergency Department visit should have a follow-up clinic visit with either a primary or a specialty clinic/provider. Please follow-up as instructed by your emergency provider today.    Return to the Emergency Department if:    You become short of breath.    You develop a fever over 101.5 F.    You pass out or become very weak or pale.    You have abdominal (belly) pain that is new or increasing.    You cough up blood.    You have new symptoms or anything that worries you.    Follow-up with your provider:    As directed by your provider today.    If you are not improved in two weeks.    If you need more pain medicine, since we do not refill pain pills through the Emergency Department.    Home care instructions:    Chest injuries can be painful.  You may take an over-the-counter pain medication such as Tylenol  (acetaminophen), Advil  (ibuprofen), Motrin  (ibuprofen) or Aleve  (naproxen).    Applying ice packs to the painful area can help your pain.     Holding a pillow against your chest can help with pain when you need to move or cough.    You may need to rest and avoid lifting particularly in the first few days after your injury.    Prevention of pneumonia (lung  infection) is also a part of managing chest injuries.  Because it can hurt to take deep breaths, you could develop collapsed areas of lung that can develop infection.  To prevent this, you need to take ten very deep breaths every hour while you are awake. Sometimes you will be given a device called an incentive spirometer to help with this. You also need to make yourself cough every hour.    Rib belts or binders are not generally recommended, since they may increase the risk of pneumonia. If you do use one, use it for only short periods of time.   If you were given a prescription for medicine here today, be sure to read all of the information (including the package insert) that comes with your prescription.  This will include important information about the medicine, its side effects, and any warnings that you need to know about.  The pharmacist who fills the prescription can provide more information and answer questions you may have about the medicine.  If you have questions or concerns that the pharmacist cannot address, please call or return to the Emergency Department.   Remember that you can always come back to the Emergency Department if you are not able to see your regular provider in the amount of time listed above, if you get any new symptoms, or if there is anything that worries you.

## 2018-10-08 NOTE — ED AVS SNAPSHOT
[facility] Emergency Department    [address]    [facility] [address]    Phone:  [phone]    Fax:  [phone]                                       Amy Clemens   MRN: [id number]    Department:  [facility] Emergency Department   Date of Visit:  10/8/2018           After Visit Summary Signature Page     I have received my discharge instructions, and my questions have been answered. I have discussed any challenges I see with this plan with the nurse or doctor.    ..........................................................................................................................................  Patient/Patient Representative Signature      ..........................................................................................................................................  Patient Representative Print Name and Relationship to Patient    ..................................................               ................................................  Date                                   Time    ..........................................................................................................................................  Reviewed by Signature/Title    ...................................................              ..............................................  Date                                               Time          22EPIC Rev 08/18

## 2018-10-08 NOTE — ED AVS SNAPSHOT
[facility]    [address] [place] [address]    Phone:  [phone]    Fax:  [phone]                                       Amy Clemens   MRN: [id number]    Department:  [facility]   Date of Visit:  10/8/2018           Patient Information     Date Of Birth          1980        Your diagnoses for this visit were:     Contusion of rib on left side, initial encounter        You were seen by Treasure Moreno MD.      Follow-up Information     Follow up with Jemima Melgar. Schedule an appointment as soon as possible for a visit in 3 days.    Specialty:  Family Practice    Contact information:    [facility] [place]  [address]   [place] [address]  [phone]          Discharge Instructions       Please continue to take deep breaths and cough, use incentive spirometry as well.  Avoid splinting on the side of injury.  If you notice increasing pain, cough productive of sputum, fevers, pain elsewhere or other acute changes return to the ED.      May use norco for breakthrough pain but do not drive, drink or operate heavy machinery.    Discharge Instructions  Chest Injury    You have been seen today because of a chest injury.  You may have contusion (bruise) of the chest or a rib fracture (broken bone).  Rib fractures can be hard to see on x-ray, so we cannot always be sure whether your rib is broken or bruised. Fortunately, the treatment of these injuries is usually the same, and includes pain control and preventing complications.    Generally, every Emergency Department visit should have a follow-up clinic visit with either a primary or a specialty clinic/provider. Please follow-up as instructed by your emergency provider today.    Return to the Emergency Department if:    You become short of breath.    You develop a fever over 101.5 F.    You pass out or become very weak or pale.    You have abdominal  (belly) pain that is new or increasing.    You cough up blood.    You have new symptoms or anything that worries you.    Follow-up with your provider:    As directed by your provider today.    If you are not improved in two weeks.    If you need more pain medicine, since we do not refill pain pills through the Emergency Department.    Home care instructions:    Chest injuries can be painful.  You may take an over-the-counter pain medication such as Tylenol  (acetaminophen), Advil  (ibuprofen), Motrin  (ibuprofen) or Aleve  (naproxen).    Applying ice packs to the painful area can help your pain.     Holding a pillow against your chest can help with pain when you need to move or cough.    You may need to rest and avoid lifting particularly in the first few days after your injury.    Prevention of pneumonia (lung infection) is also a part of managing chest injuries.  Because it can hurt to take deep breaths, you could develop collapsed areas of lung that can develop infection.  To prevent this, you need to take ten very deep breaths every hour while you are awake. Sometimes you will be given a device called an incentive spirometer to help with this. You also need to make yourself cough every hour.    Rib belts or binders are not generally recommended, since they may increase the risk of pneumonia. If you do use one, use it for only short periods of time.   If you were given a prescription for medicine here today, be sure to read all of the information (including the package insert) that comes with your prescription.  This will include important information about the medicine, its side effects, and any warnings that you need to know about.  The pharmacist who fills the prescription can provide more information and answer questions you may have about the medicine.  If you have questions or concerns that the pharmacist cannot address, please call or return to the Emergency Department.   Remember that you can always come  back to the Emergency Department if you are not able to see your regular provider in the amount of time listed above, if you get any new symptoms, or if there is anything that worries you.      24 Hour Appointment Hotline       To make an appointment at any [facility], call [phone] ([phone]). If you don't have a family doctor or clinic, we will help you find one. [place] clinics are conveniently located to serve the needs of you and your family.             Review of your medicines      START taking        Dose / Directions Last dose taken    HYDROcodone-acetaminophen 5-325 MG per tablet   Commonly known as:  NORCO   Dose:  1 tablet   Quantity:  8 tablet        Take 1 tablet by mouth every 6 hours as needed for severe pain   Refills:  0        lidocaine 5 % Patch   Commonly known as:  LIDODERM   Dose:  1 patch   Quantity:  10 patch        Place 1 patch onto the skin every 24 hours for 10 days   Refills:  0          Our records show that you are taking the medicines listed below. If these are incorrect, please call your family doctor or clinic.        Dose / Directions Last dose taken    ASPIRIN PO        Refills:  0        calcium carbonate 500 MG chewable tablet   Commonly known as:  TUMS   Dose:  1 chew tab        Take 1 chew tab by mouth daily   Refills:  0        pantoprazole 40 MG EC tablet   Commonly known as:  PROTONIX   Dose:  40 mg        40 mg daily   Refills:  2                Information about OPIOIDS     PRESCRIPTION OPIOIDS: WHAT YOU NEED TO KNOW   We gave you an opioid (narcotic) pain medicine. It is important to manage your pain, but opioids are not always the best choice. You should first try all the other options your care team gave you. Take this medicine for as short a time (and as few doses) as possible.    Some activities can increase your pain, such as bandage changes or therapy sessions. It may help to take your pain medicine 30 to 60 minutes before these activities.  Reduce your stress by getting enough sleep, working on hobbies you enjoy and practicing relaxation or meditation. Talk to your care team about ways to manage your pain beyond prescription opioids.    These medicines have risks:    DO NOT drive when on new or higher doses of pain medicine. These medicines can affect your alertness and reaction times, and you could be arrested for driving under the influence (DUI). If you need to use opioids long-term, talk to your care team about driving.    DO NOT operate heavy machinery    DO NOT do any other dangerous activities while taking these medicines.    DO NOT drink any alcohol while taking these medicines.     If the opioid prescribed includes acetaminophen, DO NOT take with any other medicines that contain acetaminophen. Read all labels carefully. Look for the word  acetaminophen  or  Tylenol.  Ask your pharmacist if you have questions or are unsure.    You can get addicted to pain medicines, especially if you have a history of addiction (chemical, alcohol or substance dependence). Talk to your care team about ways to reduce this risk.    All opioids tend to cause constipation. Drink plenty of water and eat foods that have a lot of fiber, such as fruits, vegetables, prune juice, apple juice and high-fiber cereal. Take a laxative (Miralax, milk of magnesia, Colace, Senna) if you don t move your bowels at least every other day. Other side effects include upset stomach, sleepiness, dizziness, throwing up, tolerance (needing more of the medicine to have the same effect), physical dependence and slowed breathing.    Store your pills in a secure place, locked if possible. We will not replace any lost or stolen medicine. If you don t finish your medicine, please throw away (dispose) as directed by your pharmacist. The [place] [employer]ution [employer] has more information about safe disposal: https://www.pca.[facility].[place].us/living-green/managing-unwanted-medications         Prescriptions were sent or printed at these locations (2 Prescriptions)                   Other Prescriptions                Printed at Department/Unit printer (2 of 2)         HYDROcodone-acetaminophen (NORCO) 5-325 MG per tablet               lidocaine (LIDODERM) 5 % Patch                Procedures and tests performed during your visit     XR Chest 2 Views      Orders Needing Specimen Collection     None      Pending Results     No orders found from 10/6/2018 to 10/9/2018.            Pending Culture Results     No orders found from 10/6/2018 to 10/9/2018.            Pending Results Instructions     If you had any lab results that were not finalized at the time of your Discharge, you can call the ED Lab Result RN at [phone]. You will be contacted by this team for any positive Lab results or changes in treatment. The nurses are available 7 days a week from 10A to 6:30P.  You can leave a message 24 hours per day and they will return your call.        Test Results From Your Hospital Stay        10/8/2018 10:24 AM      Narrative     CHEST TWO VIEWS  10/8/2018 9:58 AM     HISTORY:  Fall and left rib pain.     COMPARISON: 3/11/2018        Impression     IMPRESSION: Lungs are well inflated and clear. No evidence of pleural  effusion or pneumothorax. Heart size is normal. No displaced rib  fractures are identified.    SAMPSON ELIZABETH MD                Clinical Quality Measure: Blood Pressure Screening     Your blood pressure was checked while you were in the emergency department today. The last reading we obtained was  BP: 123/77 . Please read the guidelines below about what these numbers mean and what you should do about them.  If your systolic blood pressure (the top number) is less than 120 and your diastolic blood pressure (the bottom number) is less than 80, then your blood pressure is normal. There is nothing more that you need to do about it.  If your systolic blood pressure (the top number) is 120-139 or  "your diastolic blood pressure (the bottom number) is 80-89, your blood pressure may be higher than it should be. You should have your blood pressure rechecked within a year by a primary care provider.  If your systolic blood pressure (the top number) is 140 or greater or your diastolic blood pressure (the bottom number) is 90 or greater, you may have high blood pressure. High blood pressure is treatable, but if left untreated over time it can put you at risk for heart attack, stroke, or kidney failure. You should have your blood pressure rechecked by a primary care provider within the next 4 weeks.  If your provider in the emergency department today gave you specific instructions to follow-up with your doctor or provider even sooner than that, you should follow that instruction and not wait for up to 4 weeks for your follow-up visit.        Thank you for choosing [facility]       Thank you for choosing [facility] for your care. Our goal is always to provide you with excellent care. Hearing back from our patients is one way we can continue to improve our services. Please take a few minutes to complete the written survey that you may receive in the mail after you visit with us. Thank you!        [employer] Information     [employer] lets you send messages to your doctor, view your test results, renew your prescriptions, schedule appointments and more. To sign up, go to [email or web address] . Click on \"Log in\" on the left side of the screen, which will take you to the Welcome page. Then click on \"Sign up Now\" on the right side of the page.     You will be asked to enter the access code listed below, as well as some personal information. Please follow the directions to create your username and password.     Your access code is: [email or web address]  Expires: 2019 11:20 AM     Your access code will  in 90 days. If you need help or a new code, please call your [facility] clinic or [phone].        Care EveryWhere ID     This " is your Care EveryWhere ID. This could be used by other organizations to access your [place] medical records  [id number]        Equal Access to Services     YUMIKO ACEVEDO : Eulogio Herrera, miles peck, klarissa stacy, xiao sweet. So [facility] [phone].    ATENCIÓN: Si habla español, tiene a meade disposición servicios gratuitos de asistencia lingüística. Llame al [phone].    We comply with applicable federal civil rights laws and [place] laws. We do not discriminate on the basis of race, color, national origin, age, disability, sex, sexual orientation, or gender identity.            After Visit Summary       This is your record. Keep this with you and show to your community pharmacist(s) and doctor(s) at your next visit.

## 2018-10-08 NOTE — ED PROVIDER NOTES
"  History     Chief Complaint:  Fall and Left sided chest pain     HPI   Amy Clemens is a 38 year old female who presents to the emergency department today for evaluation of fall and left sided chest pain. Patient notes she fell in bathtub yesterday while cleaning top of the tub when she slipped and fell on her left side on the side of the tub. Patient notes decreasing discomfort in her left arm and leg as she has been taking Naproxen twice a day. However, patient highlights persistent left sided chest pain that is exacerbated by deep breathing, heavy lifting, or ambulating. Patient does note diminished appetite and fatigue since the fall. Patient denies any fever, chills, diaphoresis, nausea, emesis, new constipation, or history of blood thinners.     Allergies:  No Known Drug Allergies    Medications:    No current medications     Past Medical History:    GERD  Tubal occulusion     Past Surgical History:    Cholecystectomy  Gyn surgery     Family History:    Maternal grandfather: Hyperlipidemia, diabetes  Maternal grandmother: diabetes, hyperlipidemia, diabetes  Paternal grandfather: Cerebrovascular disease, diabetes   Paternal aunt: Uterine cancer     Social History:  The patient was accompanied to the ED by daughter.  Smoking Status: Never Smoker  Smokeless Tobacco: Never Used  Alcohol Use: Negative  Marital Status:      Review of Systems   Constitutional: Positive for appetite change (diminished) and fatigue. Negative for chills, diaphoresis and fever.   Cardiovascular: Positive for chest pain.   Gastrointestinal: Negative for constipation, nausea and vomiting.   All other systems reviewed and are negative.    Physical Exam     Patient Vitals for the past 24 hrs:   BP Temp Temp src Pulse Resp SpO2 Height Weight   10/08/18 0935 123/77 98.1  F (36.7  C) Temporal 65 18 100 % 1.575 m (5' 2\") 83 kg (183 lb)     Physical Exam  General: Resting on the bed.  Head: No obvious trauma to head.  Ears, Nose, Throat:  " External ears normal.  Nose normal.    Eyes:  Conjunctivae clear.  Pupils are equal, round, and reactive.   Neck: Normal range of motion.  Neck supple.   CV: Regular rate and rhythm.  No murmurs.      Respiratory: Effort normal and breath sounds normal.  No wheezing or crackles.   Gastrointestinal: Soft.  No distension. There is no tenderness.    Musculoskeletal: Normal range of motion.  Non tender extremities to palpations.  Tender to left anterior costal margin.    Skin: Skin is warm and dry.  No rash noted. Ecchymosis over the left forearm and left hip.  Small ecchymosis to the left costal margin.      Emergency Department Course     Imaging:  Radiology findings were communicated with the patient who voiced understanding of the findings.    XR Chest 2 Views  Lungs are well inflated and clear. No evidence of pleural  effusion or pneumothorax. Heart size is normal. No displaced rib  fractures are identified.  SAMPSON ELIZABETH MD  Reading per radiology    Interventions:  1049 Lidocaine 4% 1 patch Transdermal     Emergency Department Course:    0952 The patient was sent for a x-ray while in the emergency department, results above.     1001 Nursing notes and vitals reviewed.    1035 I performed an exam of the patient as documented above.     1130 Recheck and update.     I personally reviewed the imaging results with the patient and answered all related questions prior to discharge.    Impression & Plan      Medical Decision Making:  Amy Clemens is a 38-year-old female otherwise healthy presents after mechanical fall.  Vital signs are unremarkable.  Broad differential pursued including not limited to fracture dislocation, rib contusion, pneumothorax, pneumonia, chest wall discomfort, etc.  Overall patient's well-appearing nontoxic.  Pain was all post fall.  She has ecchymosis over the left forearm, left hip and left costal margin.  Her remainder of exam is nonfocal.  There is no bony tenderness to palpation of the left  upper left lower extremity.  No pelvis instability.  Without tenderness on examination of the left upper or left lower extremity, I did not feel that x-rays are warranted of these areas.  She is moving them freely.  No suggestion of fracture or dislocation.  X-ray of the chest shows no evidence of acute pneumonia, pneumothorax or rib fracture.  Abdomen is soft benign without any issues.  Overall suspect most likely rib contusion versus possible small occult fracture.  Advised supportive cares, incentive spirometry, close follow-up.  Strict return precautions were discussed in regards to possible future development pneumonias with sputum production, bloody cough, etc.  She voiced understanding plan was discharged in stable improved condition.     Diagnosis:    ICD-10-CM   1. Contusion of rib on left side, initial encounter S20.212A     Disposition:   The patient is discharged to home.    Discharge Medications:  Discharge Medication List as of 10/8/2018 11:20 AM      START taking these medications    Details   HYDROcodone-acetaminophen (NORCO) 5-325 MG per tablet Take 1 tablet by mouth every 6 hours as needed for severe pain, Disp-8 tablet, R-0, Local Print      lidocaine (LIDODERM) 5 % Patch Place 1 patch onto the skin every 24 hours for 10 daysDisp-10 patch, R-0Local Print           Scribe Disclosure:  I, Chriss Gann, am serving as a scribe at 10:02 AM on 10/8/2018 to document services personally performed by Treasure Moreno MD based on my observations and the provider's statements to me.    [facility] EMERGENCY DEPARTMENT       Treasure Moreno MD  10/08/18 5719

## 2018-11-22 ENCOUNTER — HOSPITAL ENCOUNTER (EMERGENCY)
Facility: CLINIC | Age: 38
Discharge: HOME | End: 2018-11-22
Attending: EMERGENCY MEDICINE | Admitting: EMERGENCY MEDICINE
Payer: COMMERCIAL

## 2018-11-22 ENCOUNTER — APPOINTMENT (OUTPATIENT)
Dept: ULTRASOUND IMAGING | Facility: CLINIC | Age: 38
End: 2018-11-22
Attending: EMERGENCY MEDICINE
Payer: COMMERCIAL

## 2018-11-22 VITALS
TEMPERATURE: 97.5 F | BODY MASS INDEX: 34.02 KG/M2 | RESPIRATION RATE: 16 BRPM | WEIGHT: 186 LBS | DIASTOLIC BLOOD PRESSURE: 63 MMHG | OXYGEN SATURATION: 97 % | HEART RATE: 88 BPM | SYSTOLIC BLOOD PRESSURE: 109 MMHG

## 2018-11-22 DIAGNOSIS — R30.0 DYSURIA: ICD-10-CM

## 2018-11-22 DIAGNOSIS — R10.2 PELVIC PAIN IN FEMALE: ICD-10-CM

## 2018-11-22 LAB
ALBUMIN UR-MCNC: NEGATIVE MG/DL
ALBUMIN UR-MCNC: NEGATIVE MG/DL
APPEARANCE UR: CLEAR
APPEARANCE UR: CLEAR
BACTERIA #/AREA URNS HPF: ABNORMAL /HPF
BILIRUB UR QL STRIP: NEGATIVE
BILIRUB UR QL STRIP: NEGATIVE
COLOR UR AUTO: ABNORMAL
COLOR UR AUTO: ABNORMAL
GLUCOSE UR STRIP-MCNC: NEGATIVE MG/DL
GLUCOSE UR STRIP-MCNC: NEGATIVE MG/DL
HCG UR QL: NEGATIVE
HGB UR QL STRIP: ABNORMAL
HGB UR QL STRIP: ABNORMAL
KETONES UR STRIP-MCNC: NEGATIVE MG/DL
KETONES UR STRIP-MCNC: NEGATIVE MG/DL
LEUKOCYTE ESTERASE UR QL STRIP: ABNORMAL
LEUKOCYTE ESTERASE UR QL STRIP: NEGATIVE
MUCOUS THREADS #/AREA URNS LPF: PRESENT /LPF
MUCOUS THREADS #/AREA URNS LPF: PRESENT /LPF
NITRATE UR QL: POSITIVE
NITRATE UR QL: POSITIVE
PH UR STRIP: 5 PH (ref 5–7)
PH UR STRIP: 5 PH (ref 5–7)
RBC #/AREA URNS AUTO: 3 /HPF (ref 0–2)
RBC #/AREA URNS AUTO: 9 /HPF (ref 0–2)
SOURCE: ABNORMAL
SOURCE: ABNORMAL
SP GR UR STRIP: 1.01 (ref 1–1.03)
SP GR UR STRIP: 1.02 (ref 1–1.03)
SPECIMEN SOURCE: NORMAL
SQUAMOUS #/AREA URNS AUTO: 1 /HPF (ref 0–1)
SQUAMOUS #/AREA URNS AUTO: 6 /HPF (ref 0–1)
UROBILINOGEN UR STRIP-MCNC: 0 MG/DL (ref 0–2)
UROBILINOGEN UR STRIP-MCNC: 2 MG/DL (ref 0–2)
WBC #/AREA URNS AUTO: 2 /HPF (ref 0–5)
WBC #/AREA URNS AUTO: 2 /HPF (ref 0–5)
WET PREP SPEC: NORMAL

## 2018-11-22 PROCEDURE — 87591 N.GONORRHOEAE DNA AMP PROB: CPT | Performed by: EMERGENCY MEDICINE

## 2018-11-22 PROCEDURE — 87210 SMEAR WET MOUNT SALINE/INK: CPT | Performed by: EMERGENCY MEDICINE

## 2018-11-22 PROCEDURE — 76856 US EXAM PELVIC COMPLETE: CPT

## 2018-11-22 PROCEDURE — 96372 THER/PROPH/DIAG INJ SC/IM: CPT

## 2018-11-22 PROCEDURE — 81001 URINALYSIS AUTO W/SCOPE: CPT | Performed by: EMERGENCY MEDICINE

## 2018-11-22 PROCEDURE — 81025 URINE PREGNANCY TEST: CPT | Performed by: EMERGENCY MEDICINE

## 2018-11-22 PROCEDURE — 87086 URINE CULTURE/COLONY COUNT: CPT | Performed by: EMERGENCY MEDICINE

## 2018-11-22 PROCEDURE — 99285 EMERGENCY DEPT VISIT HI MDM: CPT | Mod: 25

## 2018-11-22 PROCEDURE — 87491 CHLMYD TRACH DNA AMP PROBE: CPT | Performed by: EMERGENCY MEDICINE

## 2018-11-22 PROCEDURE — 25000128 H RX IP 250 OP 636: Performed by: EMERGENCY MEDICINE

## 2018-11-22 RX ORDER — PHENAZOPYRIDINE HYDROCHLORIDE 200 MG/1
200 TABLET, FILM COATED ORAL 3 TIMES DAILY
Qty: 9 TABLET | Refills: 0 | Status: SHIPPED | OUTPATIENT
Start: 2018-11-22 | End: 2019-02-01

## 2018-11-22 RX ORDER — KETOROLAC TROMETHAMINE 15 MG/ML
15 INJECTION, SOLUTION INTRAMUSCULAR; INTRAVENOUS ONCE
Status: COMPLETED | OUTPATIENT
Start: 2018-11-22 | End: 2018-11-22

## 2018-11-22 RX ORDER — IBUPROFEN 600 MG/1
600 TABLET, FILM COATED ORAL EVERY 6 HOURS PRN
Qty: 40 TABLET | Refills: 0 | Status: SHIPPED | OUTPATIENT
Start: 2018-11-22 | End: 2019-02-01

## 2018-11-22 RX ADMIN — KETOROLAC TROMETHAMINE 15 MG: 15 INJECTION, SOLUTION INTRAMUSCULAR; INTRAVENOUS at 06:54

## 2018-11-22 ASSESSMENT — ENCOUNTER SYMPTOMS
NAUSEA: 1
VOMITING: 0
FEVER: 0
DYSURIA: 1
DIARRHEA: 0

## 2018-11-22 NOTE — ED AVS SNAPSHOT
[facility] Emergency Department    [address]    [facility] [address]    Phone:  [phone]    Fax:  [phone]                                       Amy Clemens   MRN: [id number]    Department:  [facility] Emergency Department   Date of Visit:  11/22/2018           After Visit Summary Signature Page     I have received my discharge instructions, and my questions have been answered. I have discussed any challenges I see with this plan with the nurse or doctor.    ..........................................................................................................................................  Patient/Patient Representative Signature      ..........................................................................................................................................  Patient Representative Print Name and Relationship to Patient    ..................................................               ................................................  Date                                   Time    ..........................................................................................................................................  Reviewed by Signature/Title    ...................................................              ..............................................  Date                                               Time          22EPIC Rev 08/18

## 2018-11-22 NOTE — DISCHARGE INSTRUCTIONS
Urine culture is pending. You will be called with positive results only.  Gonorrhea and Chlamydia are pending. You will be called with positive results only.  Ultrasound is normal today, but keep appointment on the 3rd to discuss ongoing symptoms.  Try pyridium again for burning with urination.   Take Motrin or Tylenol for pain.  Return with severe pain, fever >101F, or ANY OTHER CONCERNS.

## 2018-11-22 NOTE — ED PROVIDER NOTES
"  History     Chief Complaint:  Pelvic Pain    The history is provided by the patient.      Amy Clemens is an otherwise healthy 38 year old female who presents for evaluation of pelvic pain. Patient had vaginal discharge, dysuria, pelvic pain, and dyspareunia starting around 10/10/18. She was seen by primary care provider who referred to OB. After testing with these 2 providers including pelvic ultrasound (results below) and urine culture, patient was empirically treated for PID despite negative Chlamydia and gonorrhea. Patient had improvement in symptoms for a few days and was seen in follow up by OB. She then had her usual menses at the beginning of this month and the symptoms all started again. She was treated for vulvovaginal candidiasis, but symptoms persisted. She notes at one point she was given Pyridium for dysuria (3 doses) and this did improve this symptom. However, patient presents today because her symptoms have persisted including dysuria, brownish vaginal discharge, and pelvic pain. She is very concerned because she is to have a follow up ultrasound and does not believe she can \"wait that long.\" She describes pelvic pain as intermittent pressure and notes it has been particularly bad since yesterday afternoon at intensity of 8/10 with some radiation toward her lower back. She has no improvement with aspirin or Midol. Patient has had some nausea without vomiting or diarrhea. She has had no flank pain or fever. She is sexually active with a single male partner.    US Pelvic Complete 10/23/2018:   IMPRESSION:   1. Slightly heterogeneous appearance of the left adnexa most likely within normal limits. Recommend follow-up ultrasound in 6-8 weeks.  2. Heterogeneous appearance of the endometrium with blood product. This is nonspecific. In the absence of other intervention, recommend attention at follow-up ultrasound, as per radiology.     Allergies:  NKDA    Medications:    Aspirin  Pantoprazole    Past " Medical History:    GERD  Tubal occlusion    Past Surgical History:    Cholecystectomy  GYN surgery    Family History:    No past pertinent family history.    Social History:  Marital Status:    Presents with daughter.   Negative for tobacco use.  Negative for alcohol use.  Engages in intercourse with one male partner.      Review of Systems   Constitutional: Negative for fever.   Gastrointestinal: Positive for nausea. Negative for abdominal pain, diarrhea and vomiting.   Genitourinary: Positive for dyspareunia, dysuria, pelvic pain, vaginal discharge (brown) and vaginal pain. Negative for flank pain.   All other systems reviewed and are negative.      Physical Exam     Patient Vitals for the past 24 hrs:   BP Temp Temp src Pulse Heart Rate Resp SpO2 Weight   11/22/18 0823 109/63 - - - 74 16 97 % -   11/22/18 0616 119/82 97.5  F (36.4  C) Temporal 88 88 18 99 % 84.4 kg (186 lb)     Physical Exam  General: Well-developed and well-nourished. Well appearing young  woman. Cooperative.  Head:  Atraumatic.  Eyes:  Conjunctivae, lids, and sclerae are normal.  ENT:    Normal nose. Moist mucous membranes.  Neck:  Supple. Normal range of motion.  CV:  Regular rate and rhythm. Normal heart sounds with no murmurs, rubs, or gallops detected.  Resp:  No respiratory distress. Clear to auscultation bilaterally without decreased breath sounds, wheezing, rales, or rhonchi.  GI:  Soft. Non-distended. Non-tender.   :  Small amount of brown mucoid discharge in the vaginal canal without active bleeding. No CMT. No adnexal masses or tenderness. No uterine tenderness. Cervix firm and os closed.  MS:  Normal ROM.   Skin:  Warm. Non-diaphoretic. No pallor.  Neuro:  Awake. A&Ox3. Normal strength.  Psych: Normal mood and affect. Normal speech. Intermittently tearful.  Vitals reviewed.    Emergency Department Course   Imaging:  Radiographic findings were communicated with the patient who voiced understanding of the  findings.    US Pelvic, Complete w Transvaginal & Abd/Pel Duplex Limited:  No acute abnormality, as per radiology.     Laboratory:  UA with Microscopic (clean catch): Blood: Large, Nitrite: Positive (A), Leukocyte esterase: Trace (A), RBC: 3 (H), Bacteria: Few (A), Squamous epithelial: 6 (H), Mucous: Present (A), o/w WNL    UA with Microscopic (with straight catheter): Blood: Small (A), Nitrite: Positive (A), RBC: 9 (H), Mucous: Present (A), o/w WNL    Urine Culture: Pending    HCG (urine): Negative    Wet prep: No trichomonas, clue cells, or yeast seen. Few PMNs seen.     Chlamydia trachomatis: Pending    Neisseria gonorrhea: Pending    Interventions:  0654 Toradol, 15 mg, IM injection    Emergency Department Course:  Nursing notes and vitals reviewed. (3886) I performed an exam of the patient as documented above.      The patient was sent for a pelvic US while in the emergency department, findings above.      The patient provided a urine sample here in the emergency department. This was sent for laboratory testing, findings above.     (6399) I rechecked the patient and discussed the results of her workup thus far. I performed a pelvic exam at this time.     Straight catheterization for urine performed with patient consent.     (3949) I updated the patient on the rest of her work-up results. Her pain is down to a 3/10 and she is amenable to discharge.     Findings and plan explained to the patient. Patient discharged home with instructions regarding supportive care, medications, and reasons to return. The importance of close follow-up was reviewed. The patient was prescribed ibuprofen and pyridium.      I personally reviewed the laboratory and imaging results with the patient and answered all related questions prior to discharge.      Impression & Plan      Medical Decision Making:  Amy is a 38 year old woman who presents with persistent dysuria associated with pelvic pain described as pressure.  Patient has been  "seen by OB for this and was empirically treated for PID with transient improvement, although she states her symptoms returned after she menstruated.  It is notable that she had an ultrasound initially which recommended follow-up ultrasound and the patient is concerned that she \"cannot wait that long\" for follow-up ultrasound as scheduled on the 3rd of December.  Her exam is unremarkable with no significant abdominal tenderness and no significant adnexal or uterine tenderness.  She has a small amount of brown mucoid discharge in the vaginal canal with no active bleeding.  Patient was given Toradol and had complete resolution of her pain initially, and then return to 3/10 on final examination.  Initial urinalysis is contaminated with 6 squamous epithelial cells per high-powered field.  Thus, patient underwent straight catheterization which does not clearly reveal urinary tract infection.  There is no significant pyuria and very mild microscopic hematuria and no bacteria.  However, interestingly, it is nitrite positive.  This does not clearly represent urinary tract infection in my opinion, but with patient's symptoms of dysuria, I did send a urine culture to help further characterize.  It is notable that patient is not pregnant and has a negative trichomonas, BV, and candidiasis screen.  I did send gonorrhea and Chlamydia PCR's though, again, she was recently treated empirically for PID and has not had a new partner and I find these to be unlikely etiologies.  Finally, I did repeat patient's pelvic ultrasound which is unremarkable.  Specifically, adnexa appear normal. At this point, it is unclear what is causing patient's symptoms.  Certainly urinary tract infection may be contributing though, again, her urinalysis is not very impressive.  I do not believe empiric treatment is indicated at this time, though patient understands that the culture is pending and she will be called with positive results.  Further, I do not " believe this is sexually transmitted disease given she has already been treated for PID.  There is no evidence of ovarian torsion or other acute gynecologic pathology.  Her pain is very clearly pelvic and not abdominal; I do not think that a CT would provide any benefit at this time.  Differential would include endometriosis amongst others, so I have recommended patient follow-up with gynecology to discuss her ongoing symptoms.  In the interim, I will treat her dysuria with Pyridium as she did have improvement with this in the past.  I have also recommended she take Tylenol and ibuprofen for pain and keep her primary care provider updated on ongoing symptoms and gynecology follow-up.  Patient may require urology referral if symptoms persist and are not thought to be gynecologic in etiology.  I have provided strict return precautions and answered all the patient's questions.  She verbalized understanding and is amenable to discharge.    Diagnosis:    ICD-10-CM    1. Dysuria R30.0    2. Pelvic pain in female R10.2        Disposition:  discharged home    Discharge Medications:  New Prescriptions    IBUPROFEN (ADVIL/MOTRIN) 600 MG TABLET    Take 1 tablet (600 mg) by mouth every 6 hours as needed for moderate pain    PHENAZOPYRIDINE (PYRIDIUM) 200 MG TABLET    Take 1 tablet (200 mg) by mouth 3 times daily for 3 days       Scribe Disclosure:  I, Naomi Pereyra, am serving as a scribe on 11/22/2018 at 6:37 AM to personally document services performed by Gaby Farrell MD based on my observations and the provider's statements to me.       Naomi Pereyra  11/22/2018   [facility] EMERGENCY DEPARTMENT       Gaby Farrell MD  11/25/18 3500

## 2018-11-23 LAB
BACTERIA SPEC CULT: NO GROWTH
C TRACH DNA SPEC QL NAA+PROBE: NEGATIVE
Lab: NORMAL
N GONORRHOEA DNA SPEC QL NAA+PROBE: NEGATIVE
SPECIMEN SOURCE: NORMAL

## 2018-11-25 ASSESSMENT — ENCOUNTER SYMPTOMS
ABDOMINAL PAIN: 0
FLANK PAIN: 0

## 2018-12-23 ENCOUNTER — APPOINTMENT (OUTPATIENT)
Dept: GENERAL RADIOLOGY | Facility: CLINIC | Age: 38
End: 2018-12-23
Attending: EMERGENCY MEDICINE
Payer: COMMERCIAL

## 2018-12-23 ENCOUNTER — HOSPITAL ENCOUNTER (EMERGENCY)
Facility: CLINIC | Age: 38
Discharge: HOME | End: 2018-12-23
Attending: EMERGENCY MEDICINE | Admitting: EMERGENCY MEDICINE
Payer: COMMERCIAL

## 2018-12-23 VITALS
DIASTOLIC BLOOD PRESSURE: 73 MMHG | SYSTOLIC BLOOD PRESSURE: 120 MMHG | WEIGHT: 185 LBS | RESPIRATION RATE: 16 BRPM | OXYGEN SATURATION: 99 % | BODY MASS INDEX: 33.84 KG/M2 | HEART RATE: 77 BPM | TEMPERATURE: 98 F

## 2018-12-23 DIAGNOSIS — R07.9 CHEST PAIN, UNSPECIFIED TYPE: ICD-10-CM

## 2018-12-23 LAB
ANION GAP SERPL CALCULATED.3IONS-SCNC: 4 MMOL/L (ref 3–14)
B-HCG FREE SERPL-ACNC: <5 IU/L
BUN SERPL-MCNC: 18 MG/DL (ref 7–30)
CALCIUM SERPL-MCNC: 8 MG/DL (ref 8.5–10.1)
CHLORIDE SERPL-SCNC: 108 MMOL/L (ref 94–109)
CO2 SERPL-SCNC: 29 MMOL/L (ref 20–32)
CREAT SERPL-MCNC: 0.53 MG/DL (ref 0.52–1.04)
D DIMER PPP FEU-MCNC: <0.3 UG/ML FEU (ref 0–0.5)
GFR SERPL CREATININE-BSD FRML MDRD: >90 ML/MIN/{1.73_M2}
GLUCOSE SERPL-MCNC: 152 MG/DL (ref 70–99)
POTASSIUM SERPL-SCNC: 3.3 MMOL/L (ref 3.4–5.3)
SODIUM SERPL-SCNC: 141 MMOL/L (ref 133–144)
TROPONIN I SERPL-MCNC: <0.015 UG/L (ref 0–0.04)

## 2018-12-23 PROCEDURE — 25000128 H RX IP 250 OP 636: Performed by: EMERGENCY MEDICINE

## 2018-12-23 PROCEDURE — 93005 ELECTROCARDIOGRAM TRACING: CPT

## 2018-12-23 PROCEDURE — 71046 X-RAY EXAM CHEST 2 VIEWS: CPT

## 2018-12-23 PROCEDURE — 99285 EMERGENCY DEPT VISIT HI MDM: CPT | Mod: 25

## 2018-12-23 PROCEDURE — 85379 FIBRIN DEGRADATION QUANT: CPT | Performed by: EMERGENCY MEDICINE

## 2018-12-23 PROCEDURE — 84702 CHORIONIC GONADOTROPIN TEST: CPT

## 2018-12-23 PROCEDURE — 80048 BASIC METABOLIC PNL TOTAL CA: CPT | Performed by: EMERGENCY MEDICINE

## 2018-12-23 PROCEDURE — 84484 ASSAY OF TROPONIN QUANT: CPT | Performed by: EMERGENCY MEDICINE

## 2018-12-23 PROCEDURE — 96374 THER/PROPH/DIAG INJ IV PUSH: CPT

## 2018-12-23 RX ORDER — KETOROLAC TROMETHAMINE 15 MG/ML
10 INJECTION, SOLUTION INTRAMUSCULAR; INTRAVENOUS ONCE
Status: COMPLETED | OUTPATIENT
Start: 2018-12-23 | End: 2018-12-23

## 2018-12-23 RX ADMIN — KETOROLAC TROMETHAMINE 10 MG: 15 INJECTION, SOLUTION INTRAMUSCULAR; INTRAVENOUS at 22:14

## 2018-12-23 ASSESSMENT — ENCOUNTER SYMPTOMS: SHORTNESS OF BREATH: 1

## 2018-12-24 LAB — INTERPRETATION ECG - MUSE: NORMAL

## 2018-12-24 NOTE — ED NOTES
Pt provided with discharge paperwork and educated on recommended follow-up with PCP. Pt educated on how to manage pain at home and when to seek medical attention. Pt voiced understanding and denied any questions at discharge.

## 2018-12-24 NOTE — ED PROVIDER NOTES
History     Chief Complaint:  Chest Pain    HPI   Amy Clemens is an otherwise healthy 38 year old female who presents with shortness of breath and chest pain. The patient stated that her shortness of breath began during the day. Around 1900, the patient was watching a movie when she began to feel a sharp pain in her chest and tingling in her left arm. She took aspirin which improved some symptoms but she still feels some slight chest pain. The patient does note that over the past few weeks she has been noticing a little bit of chest pain but nothing that concerned her enough to come in to the E.R. The chest pain does exacerbate with deep aspiration.     CARDIAC RISK FACTORS:  Sex:    Fem  Tobacco:   No  Hypertension:   No  Hyperlipidemia:  No  Diabetes:   No  Family History:  No    PE/DVT RISK FACTORS:  Sex:    Fem  Hormones:   No  Tobacco:   No  Cancer:   No  Travel:   No  Surgery:   No  Other immobilization: No  Personal history:  No  Family history:  Father has diabetes    Allergies:  No known drug allergies    Medications:    Aspirin  Protonix    Past Medical History:    GERD  Tubal occlusion    Past Surgical History:    Cholecystectomy  GYN Surgery    Family History:    Hyperlipidemia  Diabetes  Cerebrovascular Disease  Uterine Cancer    Social History:  The patient is not a smoker and does not use alcohol.   Marital Status:   [2]     Review of Systems   Respiratory: Positive for shortness of breath.    Cardiovascular: Positive for chest pain.   Neurological:        Tingling in right arm   All other systems reviewed and are negative.    Physical Exam     Patient Vitals for the past 24 hrs:   BP Temp Temp src Pulse Heart Rate Resp SpO2 Weight   12/23/18 2307 -- -- -- -- -- -- 99 % --   12/23/18 2136 120/73 98  F (36.7  C) Temporal 77 77 16 99 % 83.9 kg (185 lb)     Physical Exam  General: Patient is alert and cooperative.  HENT:  Normal nose, oropharynx. Moist oral mucosa.  Eyes: EOMI. Normal  conjunctiva.  Neck:  Normal range of motion and appearance.   Cardiovascular:  Normal rate, regular rhythm and normal heart sounds.   Pulmonary/Chest:  Effort normal. No wheezing or crackles.  Abdominal: Soft. No distension or tenderness.     Musculoskeletal: Normal range of motion. No edema or tenderness.   Neurological: oriented, normal strength, sensation, and coordination.   Skin: Warm and dry. No rash or bruising.   Psychiatric: Normal mood and affect. Normal behavior and judgement.    Emergency Department Course   ECG (21:32:52):  Rate 74 bpm. UT interval 186. QRS duration 86. QT/QTc 394/437. P-R-T axes 51 36 29. Interpreted at  by Timmy Simons MD.  Normal sinus rhythm  Normal ECG    Imaging:  Radiographic findings were communicated with the patient who voiced understanding of the findings.  XR Chest   IMPRESSION: No acute disease.  As read by Radiology.    Laboratory:  Laboratory findings were communicated to the patient who voiced understanding of the findings.  Troponin I: <0.015  D dimer quantitative: <0.3  BMP: Potassium 3.3, Calcium 8.0, Glucose 152 o/w WNL (Creatinine 0.53)  HCG quantitative pregnancy POCT: <5.0    Interventions:  2214: Toradol 10 mg IV    Emergency Department Course:  Past medical records, nursing notes, and vitals reviewed.  : I performed an exam of the patient and obtained history, as documented above.  2215: IV inserted and blood drawn for laboratory tests, as noted above.  2255: The patient was sent for a XR Chest while in the emergency department, findings above.  2305: The patient noted that she feels better after the Toradol.   2310: I rechecked the patient. Findings and plan explained to the Patient. Patient discharged home with instructions regarding supportive care, medications, and reasons to return. The importance of close follow-up was reviewed.     Impression & Plan    Medical Decision Makin year old female with chest pain and sob.  No identifiable  risk factors for early heart disease or PE.  No fever or travel history.  Normal examination.  Testing reassuring, included chest x-ray, ekg, troponin, d-dimer.  No concern ischemic process or infection.  PE ruled out.  Suspect benign cause, likely form of chest wall discomfort or pleurisy.  Recommend otc nsaid, early f/u.    Diagnosis:    ICD-10-CM    1. Chest pain, unspecified type R07.9 Basic metabolic panel     Troponin I       Disposition:  discharged to home    Suresh Eid  12/23/2018   [facility] EMERGENCY DEPARTMENT  I, Suresh Eid, am serving as a scribe at 9:42 PM on 12/23/2018 to document services personally performed by Timmy Simons MD based on my observations and the provider's statements to me.       Timmy Simons MD  12/24/18 1525

## 2018-12-24 NOTE — ED TRIAGE NOTES
Pt reports left sided chest pain, SOB and left arm tingling which has resolved. Hx GERD took ASA at home

## 2018-12-24 NOTE — DISCHARGE INSTRUCTIONS
Discharge Instructions  Chest Pain    You have been seen today for chest pain or discomfort.  At this time, your provider has found no signs that your chest pain is due to a serious or life-threatening condition, (or you have declined more testing and/or admission to the hospital). However, sometimes there is a serious problem that does not show up right away. Your evaluation today may not be complete and you may need further testing and evaluation.     Generally, every Emergency Department visit should have a follow-up clinic visit with either a primary or a specialty clinic/provider. Please follow-up as instructed by your emergency provider today.  Return to the Emergency Department if:  Your chest pain changes, gets worse, starts to happen more often, or comes with less activity.  You are newly short of breath.  You get very weak or tired.  You pass out or faint.  You have any new symptoms, like fever, cough, numb legs, or you cough up blood.  You have anything else that worries you.    Until you follow-up with your regular provider, please do the following:  Take one aspirin daily unless you have an allergy or are told not to by your provider.  If a stress test appointment has been made, go to the appointment.  If you have questions, contact your regular provider.  Follow-up with your regular provider/clinic as directed; this is very important.    If you were given a prescription for medicine here today, be sure to read all of the information (including the package insert) that comes with your prescription.  This will include important information about the medicine, its side effects, and any warnings that you need to know about.  The pharmacist who fills the prescription can provide more information and answer questions you may have about the medicine.  If you have questions or concerns that the pharmacist cannot address, please call or return to the Emergency Department.       Remember that you can always come  back to the Emergency Department if you are not able to see your regular provider in the amount of time listed above, if you get any new symptoms, or if there is anything that worries you.

## 2019-02-01 RX ORDER — CHOLECALCIFEROL (VITAMIN D3) 50 MCG
1 TABLET ORAL DAILY
COMMUNITY
End: 2020-09-08

## 2019-02-01 RX ORDER — MULTIPLE VITAMINS W/ MINERALS TAB 9MG-400MCG
1 TAB ORAL DAILY
COMMUNITY
End: 2020-09-08

## 2019-02-01 ASSESSMENT — MIFFLIN-ST. JEOR: SCORE: 1485.55

## 2019-02-07 ENCOUNTER — HOSPITAL ENCOUNTER (OUTPATIENT)
Facility: CLINIC | Age: 39
Discharge: HOME | End: 2019-02-07
Attending: OBSTETRICS & GYNECOLOGY | Admitting: OBSTETRICS & GYNECOLOGY
Payer: COMMERCIAL

## 2019-02-07 ENCOUNTER — ANESTHESIA (OUTPATIENT)
Dept: SURGERY | Facility: CLINIC | Age: 39
End: 2019-02-07
Payer: COMMERCIAL

## 2019-02-07 ENCOUNTER — ANESTHESIA EVENT (OUTPATIENT)
Dept: SURGERY | Facility: CLINIC | Age: 39
End: 2019-02-07
Payer: COMMERCIAL

## 2019-02-07 VITALS
HEART RATE: 63 BPM | SYSTOLIC BLOOD PRESSURE: 112 MMHG | WEIGHT: 188 LBS | BODY MASS INDEX: 34.6 KG/M2 | HEIGHT: 62 IN | DIASTOLIC BLOOD PRESSURE: 68 MMHG | OXYGEN SATURATION: 100 % | RESPIRATION RATE: 16 BRPM | TEMPERATURE: 97.3 F

## 2019-02-07 DIAGNOSIS — Z98.890 STATUS POST HYSTEROSCOPY: Primary | ICD-10-CM

## 2019-02-07 LAB — HCG UR QL: NEGATIVE

## 2019-02-07 PROCEDURE — 81025 URINE PREGNANCY TEST: CPT | Performed by: OBSTETRICS & GYNECOLOGY

## 2019-02-07 PROCEDURE — 27210794 ZZH OR GENERAL SUPPLY STERILE: Performed by: OBSTETRICS & GYNECOLOGY

## 2019-02-07 PROCEDURE — 40000306 ZZH STATISTIC PRE PROC ASSESS II: Performed by: OBSTETRICS & GYNECOLOGY

## 2019-02-07 PROCEDURE — 88305 TISSUE EXAM BY PATHOLOGIST: CPT | Performed by: OBSTETRICS & GYNECOLOGY

## 2019-02-07 PROCEDURE — 25000128 H RX IP 250 OP 636: Performed by: NURSE ANESTHETIST, CERTIFIED REGISTERED

## 2019-02-07 PROCEDURE — 25000128 H RX IP 250 OP 636: Performed by: ANESTHESIOLOGY

## 2019-02-07 PROCEDURE — 71000027 ZZH RECOVERY PHASE 2 EACH 15 MINS: Performed by: OBSTETRICS & GYNECOLOGY

## 2019-02-07 PROCEDURE — 71000013 ZZH RECOVERY PHASE 1 LEVEL 1 EA ADDTL HR: Performed by: OBSTETRICS & GYNECOLOGY

## 2019-02-07 PROCEDURE — 37000009 ZZH ANESTHESIA TECHNICAL FEE, EACH ADDTL 15 MIN: Performed by: OBSTETRICS & GYNECOLOGY

## 2019-02-07 PROCEDURE — 71000012 ZZH RECOVERY PHASE 1 LEVEL 1 FIRST HR: Performed by: OBSTETRICS & GYNECOLOGY

## 2019-02-07 PROCEDURE — 36000058 ZZH SURGERY LEVEL 3 EA 15 ADDTL MIN: Performed by: OBSTETRICS & GYNECOLOGY

## 2019-02-07 PROCEDURE — 37000008 ZZH ANESTHESIA TECHNICAL FEE, 1ST 30 MIN: Performed by: OBSTETRICS & GYNECOLOGY

## 2019-02-07 PROCEDURE — 25000125 ZZHC RX 250: Performed by: NURSE ANESTHETIST, CERTIFIED REGISTERED

## 2019-02-07 PROCEDURE — 36000056 ZZH SURGERY LEVEL 3 1ST 30 MIN: Performed by: OBSTETRICS & GYNECOLOGY

## 2019-02-07 PROCEDURE — 88305 TISSUE EXAM BY PATHOLOGIST: CPT | Mod: 26 | Performed by: OBSTETRICS & GYNECOLOGY

## 2019-02-07 PROCEDURE — 25000132 ZZH RX MED GY IP 250 OP 250 PS 637: Performed by: OBSTETRICS & GYNECOLOGY

## 2019-02-07 RX ORDER — ONDANSETRON 2 MG/ML
INJECTION INTRAMUSCULAR; INTRAVENOUS PRN
Status: DISCONTINUED | OUTPATIENT
Start: 2019-02-07 | End: 2019-02-07

## 2019-02-07 RX ORDER — FENTANYL CITRATE 50 UG/ML
25-50 INJECTION, SOLUTION INTRAMUSCULAR; INTRAVENOUS
Status: DISCONTINUED | OUTPATIENT
Start: 2019-02-07 | End: 2019-02-07 | Stop reason: HOSPADM

## 2019-02-07 RX ORDER — SODIUM CHLORIDE, SODIUM LACTATE, POTASSIUM CHLORIDE, CALCIUM CHLORIDE 600; 310; 30; 20 MG/100ML; MG/100ML; MG/100ML; MG/100ML
INJECTION, SOLUTION INTRAVENOUS CONTINUOUS
Status: DISCONTINUED | OUTPATIENT
Start: 2019-02-07 | End: 2019-02-07 | Stop reason: HOSPADM

## 2019-02-07 RX ORDER — LIDOCAINE HYDROCHLORIDE 10 MG/ML
INJECTION, SOLUTION INFILTRATION; PERINEURAL PRN
Status: DISCONTINUED | OUTPATIENT
Start: 2019-02-07 | End: 2019-02-07

## 2019-02-07 RX ORDER — IBUPROFEN 600 MG/1
600 TABLET, FILM COATED ORAL EVERY 6 HOURS PRN
Status: DISCONTINUED | OUTPATIENT
Start: 2019-02-07 | End: 2019-02-07 | Stop reason: HOSPADM

## 2019-02-07 RX ORDER — HYDROMORPHONE HYDROCHLORIDE 1 MG/ML
.3-.5 INJECTION, SOLUTION INTRAMUSCULAR; INTRAVENOUS; SUBCUTANEOUS EVERY 10 MIN PRN
Status: DISCONTINUED | OUTPATIENT
Start: 2019-02-07 | End: 2019-02-07 | Stop reason: HOSPADM

## 2019-02-07 RX ORDER — DEXAMETHASONE SODIUM PHOSPHATE 4 MG/ML
INJECTION, SOLUTION INTRA-ARTICULAR; INTRALESIONAL; INTRAMUSCULAR; INTRAVENOUS; SOFT TISSUE PRN
Status: DISCONTINUED | OUTPATIENT
Start: 2019-02-07 | End: 2019-02-07

## 2019-02-07 RX ORDER — NALOXONE HYDROCHLORIDE 0.4 MG/ML
.1-.4 INJECTION, SOLUTION INTRAMUSCULAR; INTRAVENOUS; SUBCUTANEOUS
Status: DISCONTINUED | OUTPATIENT
Start: 2019-02-07 | End: 2019-02-07 | Stop reason: HOSPADM

## 2019-02-07 RX ORDER — ONDANSETRON 4 MG/1
4 TABLET, ORALLY DISINTEGRATING ORAL EVERY 30 MIN PRN
Status: DISCONTINUED | OUTPATIENT
Start: 2019-02-07 | End: 2019-02-07 | Stop reason: HOSPADM

## 2019-02-07 RX ORDER — PROPOFOL 10 MG/ML
INJECTION, EMULSION INTRAVENOUS PRN
Status: DISCONTINUED | OUTPATIENT
Start: 2019-02-07 | End: 2019-02-07

## 2019-02-07 RX ORDER — IBUPROFEN 600 MG/1
600 TABLET, FILM COATED ORAL EVERY 6 HOURS PRN
Qty: 30 TABLET | Refills: 0 | Status: SHIPPED | OUTPATIENT
Start: 2019-02-07 | End: 2019-03-09

## 2019-02-07 RX ORDER — KETOROLAC TROMETHAMINE 30 MG/ML
30 INJECTION, SOLUTION INTRAMUSCULAR; INTRAVENOUS ONCE
Qty: 1 ML | Refills: 0 | Status: SHIPPED | OUTPATIENT
Start: 2019-02-07 | End: 2019-02-07

## 2019-02-07 RX ORDER — ONDANSETRON 2 MG/ML
4 INJECTION INTRAMUSCULAR; INTRAVENOUS EVERY 30 MIN PRN
Status: DISCONTINUED | OUTPATIENT
Start: 2019-02-07 | End: 2019-02-07 | Stop reason: HOSPADM

## 2019-02-07 RX ORDER — ALBUTEROL SULFATE 0.83 MG/ML
2.5 SOLUTION RESPIRATORY (INHALATION) EVERY 4 HOURS PRN
Status: DISCONTINUED | OUTPATIENT
Start: 2019-02-07 | End: 2019-02-07 | Stop reason: HOSPADM

## 2019-02-07 RX ORDER — LABETALOL HYDROCHLORIDE 5 MG/ML
10 INJECTION, SOLUTION INTRAVENOUS EVERY 5 MIN PRN
Status: DISCONTINUED | OUTPATIENT
Start: 2019-02-07 | End: 2019-02-07 | Stop reason: HOSPADM

## 2019-02-07 RX ORDER — MEPERIDINE HYDROCHLORIDE 50 MG/ML
12.5 INJECTION INTRAMUSCULAR; INTRAVENOUS; SUBCUTANEOUS
Status: DISCONTINUED | OUTPATIENT
Start: 2019-02-07 | End: 2019-02-07 | Stop reason: HOSPADM

## 2019-02-07 RX ORDER — FENTANYL CITRATE 50 UG/ML
INJECTION, SOLUTION INTRAMUSCULAR; INTRAVENOUS PRN
Status: DISCONTINUED | OUTPATIENT
Start: 2019-02-07 | End: 2019-02-07

## 2019-02-07 RX ORDER — FENTANYL CITRATE 50 UG/ML
25-50 INJECTION, SOLUTION INTRAMUSCULAR; INTRAVENOUS
Status: CANCELLED | OUTPATIENT
Start: 2019-02-07

## 2019-02-07 RX ADMIN — FENTANYL CITRATE 100 MCG: 50 INJECTION, SOLUTION INTRAMUSCULAR; INTRAVENOUS at 09:50

## 2019-02-07 RX ADMIN — IBUPROFEN 600 MG: 600 TABLET ORAL at 12:18

## 2019-02-07 RX ADMIN — MIDAZOLAM 2 MG: 1 INJECTION INTRAMUSCULAR; INTRAVENOUS at 09:46

## 2019-02-07 RX ADMIN — ONDANSETRON 4 MG: 2 INJECTION INTRAMUSCULAR; INTRAVENOUS at 13:02

## 2019-02-07 RX ADMIN — Medication 0.5 MG: at 11:42

## 2019-02-07 RX ADMIN — FENTANYL CITRATE 50 MCG: 50 INJECTION, SOLUTION INTRAMUSCULAR; INTRAVENOUS at 10:42

## 2019-02-07 RX ADMIN — DEXAMETHASONE SODIUM PHOSPHATE 4 MG: 4 INJECTION, SOLUTION INTRA-ARTICULAR; INTRALESIONAL; INTRAMUSCULAR; INTRAVENOUS; SOFT TISSUE at 09:51

## 2019-02-07 RX ADMIN — LIDOCAINE HYDROCHLORIDE 50 MG: 10 INJECTION, SOLUTION INFILTRATION; PERINEURAL at 09:50

## 2019-02-07 RX ADMIN — SODIUM CHLORIDE, POTASSIUM CHLORIDE, SODIUM LACTATE AND CALCIUM CHLORIDE: 600; 310; 30; 20 INJECTION, SOLUTION INTRAVENOUS at 09:46

## 2019-02-07 RX ADMIN — PROPOFOL 200 MG: 10 INJECTION, EMULSION INTRAVENOUS at 09:50

## 2019-02-07 RX ADMIN — ONDANSETRON 4 MG: 2 INJECTION INTRAMUSCULAR; INTRAVENOUS at 10:07

## 2019-02-07 RX ADMIN — FENTANYL CITRATE 50 MCG: 50 INJECTION, SOLUTION INTRAMUSCULAR; INTRAVENOUS at 11:08

## 2019-02-07 ASSESSMENT — MIFFLIN-ST. JEOR: SCORE: 1481.01

## 2019-02-07 NOTE — OP NOTE
[facility] Operative Note  Hysteroscopy, Dilation and Curettage    Date of Surgery: 2/7/2019  Patient: Amy Clemens  MRN: [id number]    Pre-operative diagnosis: Abnormal uterine bleeding  Suspected endometrial polyp  Planning IVF in the future      Post-operative diagnosis: Same  Endometrial polyp  Proliferative endometrial lining   Procedure: Hysteroscopy, dilation and curettage, polypectomy   Surgeon: Floresita Zimmerman MD   Anesthesia: MAC (monitored anesthesia care)   Estimated blood loss: 5 mL   Fluid deficit ~300. Lots of fluid loss on the floor   Total IV fluids: 800ml   Blood transfusion: No transfusion was given during surgery   Total urine output: None   Drains: None   Specimens: Endometrial polyp/endometrial curetting   Findings: Fluffy endometrial lining with large endometrial polyp noted on the posterior uterine wall near the fundus. Tubal ostia visualized bilaterally.   Complications: None   Condition: Stable     Indications: Amy Clemens is a 38 year old  who initially presented with intermittent pelvic pain as well as irregular bleeding. Patient was initially treated for PID and did have some improvement of her pain initially. However, she returned again with ongoing symptoms and pelvic ultrasound at this time showed a endometrial polyp. Subsequent SIS confirmed these findings. Given this, she was recommended to undergo a hysteroscopy, D&C, with polyp removal. Patient is desirous of future pregnancy. She had a previous tubal ligation and was considering tubal reanastomosis but more recently has been investigating IVF and met with [facility] a couple of weeks ago. She is requesting pictures from this procedure per the request of her [facility] physician. Risks, benefits, and alternatives were reviewed and informed consent was signed.    Procedure in detail: The patient was taken to the OR where MAC anesthesia was administered without difficulty. She was placed in the dorsal lithotomy position with Curtis  trish. The patient was then prepped and draped in usual sterile fashion.    An open-sided speculum was inserted in the vagina and the cervix was brought into view. A single-toothed tenaculum was used to grasp the anterior lip of the cervix. The cervical os was sequentially dilated to accommodate the Myosure hysteroscope using Hegar dilators. The hysteroscope was then introduced into the uterus under direct visualization and the uterus was distended with normal saline. The inside of the uterus was then visualized and findings included as noted above. The Myosure was introduced and used to resect the polyp in its entirety as well to complete resection of the fluffy endometrial lining. The hysteroscope was then withdrawn and a sharp curette was introduced. The uterus was curetted in all quadrants until a gritty feeling was noted in all aspects of the uterus. The endometrial scrapings were sent to pathology. The tenaculum was removed from the cervix and good hemostasis was noted at the puncture sites.     The patient tolerated the procedure well. Instrument and sponge counts were correct x 2. The patient was transported to the recovery room in stable condition.    Ashley Hieronimus, MD [facility]  2/7/2019 9:35 AM

## 2019-02-07 NOTE — BRIEF OP NOTE
[facility] Brief Operative Note    Pre-operative diagnosis: Abnormal uterine bleeding  Suspected endometrial polyp  Planning IVF in the future     Post-operative diagnosis: Same  Endometrial polyp  Proliferative endometrial lining   Procedure: Hysteroscopy, dilation and curettage, polypectomy   Surgeon: Floresita Zimmerman MD   Anesthesia: MAC (monitored anesthesia care)   Estimated blood loss: 5 mL   Fluid deficit ~300. Lots of fluid loss on the floor   Total IV fluids: 800ml   Blood transfusion: No transfusion was given during surgery   Total urine output: None   Drains: None   Specimens: Endometrial polyp/endometrial curetting   Findings: Fluffy endometrial lining with large endometrial polyp noted on the posterior uterine wall near the fundus. Tubal ostia visualized bilaterally.   Complications: None   Condition: Stable   Comments: See dictated operative report for full details     Ashley Hieronimus, MD [facility]  2/7/2019 9:32 AM

## 2019-02-07 NOTE — ANESTHESIA POSTPROCEDURE EVALUATION
Patient: Amy Clemens    Procedure(s):  hysteroscopy, dilation and curettage, polypectomy with myosure (Choice anesthesia)    Diagnosis:Polyp  Diagnosis Additional Information: Abnormal uterine bleeding  Planning IVF in the future    Endometrial polyp  Proliferative endometrial lining    Anesthesia Type:  General, LMA    Note:  Anesthesia Post Evaluation    Patient location during evaluation: PACU  Patient participation: Able to fully participate in evaluation  Level of consciousness: awake and alert  Pain management: adequate  Airway patency: patent  Cardiovascular status: acceptable  Respiratory status: acceptable  Hydration status: acceptable  PONV: none     Anesthetic complications: None          Last vitals:  Vitals:    02/07/19 1035 02/07/19 1045 02/07/19 1100   BP: 100/60 101/61 104/66   Pulse: 59 63 63   Resp: 16 16 14   Temp:      SpO2: 100% 97% 96%         Electronically Signed By: Charly Gustafson MD  February 7, 2019  11:22 AM

## 2019-02-07 NOTE — DISCHARGE INSTRUCTIONS
GENERAL ANESTHESIA OR SEDATION ADULT DISCHARGE INSTRUCTIONS   SPECIAL PRECAUTIONS FOR 24 HOURS AFTER SURGERY    IT IS NOT UNUSUAL TO FEEL LIGHT-HEADED OR FAINT, UP TO 24 HOURS AFTER SURGERY OR WHILE TAKING PAIN MEDICATION.  IF YOU HAVE THESE SYMPTOMS; SIT FOR A FEW MINUTES BEFORE STANDING AND HAVE SOMEONE ASSIST YOU WHEN YOU GET UP TO WALK OR USE THE BATHROOM.    YOU SHOULD REST AND RELAX FOR THE NEXT 24 HOURS AND YOU MUST MAKE ARRANGEMENTS TO HAVE SOMEONE STAY WITH YOU FOR AT LEAST 24 HOURS AFTER YOUR DISCHARGE.  AVOID HAZARDOUS AND STRENUOUS ACTIVITIES.  DO NOT MAKE IMPORTANT DECISIONS FOR 24 HOURS.    DO NOT DRIVE ANY VEHICLE OR OPERATE MECHANICAL EQUIPMENT FOR 24 HOURS FOLLOWING THE END OF YOUR SURGERY.  EVEN THOUGH YOU MAY FEEL NORMAL, YOUR REACTIONS MAY BE AFFECTED BY THE MEDICATION YOU HAVE RECEIVED.    DO NOT DRINK ALCOHOLIC BEVERAGES FOR 24 HOURS FOLLOWING YOUR SURGERY.    DRINK CLEAR LIQUIDS (APPLE JUICE, GINGER ALE, 7-UP, BROTH, ETC.).  PROGRESS TO YOUR REGULAR DIET AS YOU FEEL ABLE.    YOU MAY HAVE A DRY MOUTH, A SORE THROAT, MUSCLES ACHES OR TROUBLE SLEEPING.  THESE SHOULD GO AWAY AFTER 24 HOURS.    CALL YOUR DOCTOR FOR ANY OF THE FOLLOWING:  SIGNS OF INFECTION (FEVER, GROWING TENDERNESS AT THE SURGERY SITE, A LARGE AMOUNT OF DRAINAGE OR BLEEDING, SEVERE PAIN, FOUL-SMELLING DRAINAGE, REDNESS OR SWELLING.    IT HAS BEEN OVER 8 TO 10 HOURS SINCE SURGERY AND YOU ARE STILL NOT ABLE TO URINATE (PASS WATER).       DILATION AND CURETTAGE DISCHARGE INSTRUCTIONS    PLEASE RETURN TO THE CLINIC IN:  ____1 WEEK  ____2 WEEKS  ____4 WEEKS  ____6 WEEKS  MAKE THIS APPOINTMENT AFTER YOU GET HOME IF IT HAS NOT ALREADY BEEN SCHEDULED.    DO NOT DRIVE A CAR, DRINK ALCOHOL OR USE MACHINERY FOR THE NEXT 24 HOURS.  YOU SHOULD WAIT UNTIL YOU HAVE RECOVERED BEFORE MAKING ANY IMPORTANT DECISIONS.    PAIN AND DISCOMFORT  YOU MAY HAVE CRAMPS OR A LOW BACKACHE FOR 24 TO 48 HOURS.  TYLENOL (ACETAMINOPHEN) OR MOTRIN (IBUPROFEN) MAY  HELP, OR YOUR DOCTOR MAY GIVE YOU PAIN MEDICINE.  CALL YOUR DOCTOR IF PAIN CANNOT BE CONTROLLED.  YOU MAY FEEL DROWSY AND WEAK FOR A DAY OR TWO.    VAGINAL DISCHARGE  YOU MAY HAVE SOME BLEEDING OR DISCHARGE FOR UP TO TWO WEEKS.  DO NOT DOUCHE, USE TAMPONS OR HAVE SEX (INTERCOURSE) IN THE FIRST WEEK.  CALL YOUR DOCTOR IF YOU SOAK MORE THAN ONE MAXI PAD (SANITARY NAPKIN) PER HOUR, OR IF YOU PASS LARGE BLOOD CLOTS.    OTHER SYMPTOMS  YOU MAY HAVE A LOW FEVER FOR THE FIRST TWO DAYS.  CALL YOUR DOCTOR IF YOUR FEVER GOES OVER 101 DEGREES FAHRENHEIT.    IF YOU HAVE NAUSEA (FEEL SICK TO YOUR STOMACH), STAY IN BED.  TRY DRINKING A SMALL AMOUNT 7-UP, TEA OR SOUP.    DIET AND ACTIVITY  EAT LIGHT MEALS AND DRINK PLENTY OF FLUIDS FOR THE FIRST 24 HOURS (OR LONGER, IF YOU HAVE NAUSEA).    YOU MAY BATHE, SHOWER AND CLIMB STAIRS.  MOST WOMEN CAN RETURN TO WORK AFTER 24 HOURS.  YOU MAY GO BACK TO YOUR OTHER ACTIVITIES AFTER YOUR PAIN GOES AWAY.          600 mg Ibuprofen given at 12:15 pm

## 2019-02-07 NOTE — ANESTHESIA PREPROCEDURE EVALUATION
Anesthesia Pre-Procedure Evaluation    Patient: Amy Clemens   MRN: [id number] : 1980          Preoperative Diagnosis: Polyp    Procedure(s):  hysteroscopy, dilation and curettage, polypectomy with myosure (Choice anesthesia)    Past Medical History:   Diagnosis Date     Endometriosis      Gastroesophageal reflux disease      Thyroid disease      Tubal occlusion      Past Surgical History:   Procedure Laterality Date     CHOLECYSTECTOMY       GYN SURGERY  2006    tubal ligation     Anesthesia Evaluation     . Pt has had prior anesthetic.     No history of anesthetic complications          ROS/MED HX    ENT/Pulmonary:  - neg pulmonary ROS     Neurologic:  - neg neurologic ROS     Cardiovascular:  - neg cardiovascular ROS       METS/Exercise Tolerance:     Hematologic:  - neg hematologic  ROS       Musculoskeletal:  - neg musculoskeletal ROS       GI/Hepatic:     (+) GERD Asymptomatic on medication,       Renal/Genitourinary:  - ROS Renal section negative       Endo:  - neg endo ROS       Psychiatric:  - neg psychiatric ROS       Infectious Disease:         Malignancy:      - no malignancy   Other:    - neg other ROS                      Physical Exam  Normal systems: cardiovascular and pulmonary    Airway   Mallampati: II  TM distance: >3 FB  Neck ROM: full    Dental   (+) chipped  Comment: Incisors chipped    Cardiovascular       Pulmonary             Lab Results   Component Value Date    WBC 7.5 2018    HGB 13.2 2018    HCT 39.4 2018     2018    SED 7 10/15/2016     2018    POTASSIUM 3.3 (L) 2018    CHLORIDE 108 2018    CO2 29 2018    BUN 18 2018    CR 0.53 2018     (H) 2018    DAVID 8.0 (L) 2018    ALBUMIN 3.6 2018    PROTTOTAL 7.0 2018    ALT 27 2018    AST 14 2018    ALKPHOS 80 2018    BILITOTAL 0.2 2018    LIPASE 164 2018    HCG Negative 2019       Preop Vitals  BP  "Readings from Last 3 Encounters:   02/07/19 109/69   12/23/18 120/73   11/22/18 109/63    Pulse Readings from Last 3 Encounters:   02/07/19 77   12/23/18 77   11/22/18 88      Resp Readings from Last 3 Encounters:   02/07/19 18   12/23/18 16   11/22/18 16    SpO2 Readings from Last 3 Encounters:   02/07/19 98%   12/23/18 99%   11/22/18 97%      Temp Readings from Last 1 Encounters:   02/07/19 97.7  F (36.5  C) (Temporal)    Ht Readings from Last 1 Encounters:   02/07/19 1.575 m (5' 2\")      Wt Readings from Last 1 Encounters:   02/07/19 85.3 kg (188 lb)    Estimated body mass index is 34.39 kg/m  as calculated from the following:    Height as of this encounter: 1.575 m (5' 2\").    Weight as of this encounter: 85.3 kg (188 lb).       Anesthesia Plan      History & Physical Review  History and physical reviewed and following examination; no interval change.    ASA Status:  1 .    NPO Status:  > 8 hours    Plan for General and LMA with Intravenous and Propofol induction. Maintenance will be Balanced.    PONV prophylaxis:  Ondansetron (or other 5HT-3) and Dexamethasone or Solumedrol       Postoperative Care  Postoperative pain management:  IV analgesics and Oral pain medications.      Consents  Anesthetic plan, risks, benefits and alternatives discussed with:  Patient or representative and Patient..                 Charly Gustafson MD                    .  "

## 2019-02-07 NOTE — ANESTHESIA CARE TRANSFER NOTE
Patient: Amy Clemens    Procedure(s):  hysteroscopy, dilation and curettage, polypectomy with myosure (Choice anesthesia)    Diagnosis: Polyp  Diagnosis Additional Information: No value filed.    Anesthesia Type:   General, LMA     Note:  Airway :Face Mask  Patient transferred to:PACU  Comments: Pt VSS, breathing spontaneously on O2. Handoff Report: Identifed the Patient, Identified the Reponsible Provider, Reviewed the pertinent medical history, Discussed the surgical course, Reviewed Intra-OP anesthesia mangement and issues during anesthesia, Set expectations for post-procedure period and Allowed opportunity for questions and acknowledgement of understanding      Vitals: (Last set prior to Anesthesia Care Transfer)    CRNA VITALS  2/7/2019 0946 - 2/7/2019 1023      2/7/2019             NIBP:  98/57    Pulse:  75    NIBP Mean:  73    SpO2:  99 %    Resp Rate (observed):  2  (Abnormal)                 Electronically Signed By: KYRIE Chi CRNA  February 7, 2019  10:23 AM

## 2019-02-08 LAB — COPATH REPORT: NORMAL

## 2019-03-24 ENCOUNTER — NURSE TRIAGE (OUTPATIENT)
Dept: NURSING | Facility: CLINIC | Age: 39
End: 2019-03-24

## 2019-03-24 ENCOUNTER — HOSPITAL ENCOUNTER (EMERGENCY)
Facility: CLINIC | Age: 39
End: 2019-03-24
Payer: COMMERCIAL

## 2019-03-25 NOTE — TELEPHONE ENCOUNTER
Amy called because she is scheduled to give herself a fertility treatment by intramuscular injection tonight at 10 pm    Her instructions are to administer the injection in her buttocks. She is not able to do it on her own, he  does not feel comfortable giving the injection and she does not have any friends or family that could help.    Advised ER.  Bring all supplies and instructions with.    Yany Rosa RN  [facility]        Additional Information    Caller requesting information not related to medicine    Health Information question, no triage required and triager able to answer question    Protocols used: MEDICATION QUESTION CALL-ADULT-, INFORMATION ONLY CALL-ADULT-AH

## 2020-01-09 RX ORDER — LEVOTHYROXINE SODIUM 50 UG/1
50 TABLET ORAL DAILY
COMMUNITY

## 2020-01-09 ASSESSMENT — MIFFLIN-ST. JEOR: SCORE: 1478.28

## 2020-01-13 ENCOUNTER — ANESTHESIA (OUTPATIENT)
Dept: SURGERY | Facility: CLINIC | Age: 40
End: 2020-01-13
Payer: COMMERCIAL

## 2020-01-13 ENCOUNTER — ANESTHESIA EVENT (OUTPATIENT)
Dept: SURGERY | Facility: CLINIC | Age: 40
End: 2020-01-13
Payer: COMMERCIAL

## 2020-01-13 ENCOUNTER — HOSPITAL ENCOUNTER (OUTPATIENT)
Facility: CLINIC | Age: 40
Discharge: HOME | End: 2020-01-13
Attending: OBSTETRICS & GYNECOLOGY | Admitting: OBSTETRICS & GYNECOLOGY
Payer: COMMERCIAL

## 2020-01-13 VITALS
HEIGHT: 63 IN | RESPIRATION RATE: 16 BRPM | DIASTOLIC BLOOD PRESSURE: 81 MMHG | TEMPERATURE: 98.5 F | OXYGEN SATURATION: 98 % | BODY MASS INDEX: 32.78 KG/M2 | HEART RATE: 67 BPM | WEIGHT: 185 LBS | SYSTOLIC BLOOD PRESSURE: 116 MMHG

## 2020-01-13 DIAGNOSIS — Z98.890 STATUS POST HYSTEROSCOPY: Primary | ICD-10-CM

## 2020-01-13 PROCEDURE — 36000058 ZZH SURGERY LEVEL 3 EA 15 ADDTL MIN: Performed by: OBSTETRICS & GYNECOLOGY

## 2020-01-13 PROCEDURE — 40000306 ZZH STATISTIC PRE PROC ASSESS II: Performed by: OBSTETRICS & GYNECOLOGY

## 2020-01-13 PROCEDURE — 71000027 ZZH RECOVERY PHASE 2 EACH 15 MINS: Performed by: OBSTETRICS & GYNECOLOGY

## 2020-01-13 PROCEDURE — 25000128 H RX IP 250 OP 636: Performed by: NURSE ANESTHETIST, CERTIFIED REGISTERED

## 2020-01-13 PROCEDURE — 25800030 ZZH RX IP 258 OP 636: Performed by: ANESTHESIOLOGY

## 2020-01-13 PROCEDURE — 37000009 ZZH ANESTHESIA TECHNICAL FEE, EACH ADDTL 15 MIN: Performed by: OBSTETRICS & GYNECOLOGY

## 2020-01-13 PROCEDURE — 27210794 ZZH OR GENERAL SUPPLY STERILE: Performed by: OBSTETRICS & GYNECOLOGY

## 2020-01-13 PROCEDURE — 88305 TISSUE EXAM BY PATHOLOGIST: CPT | Mod: 26 | Performed by: OBSTETRICS & GYNECOLOGY

## 2020-01-13 PROCEDURE — 25000125 ZZHC RX 250: Performed by: OBSTETRICS & GYNECOLOGY

## 2020-01-13 PROCEDURE — 36000056 ZZH SURGERY LEVEL 3 1ST 30 MIN: Performed by: OBSTETRICS & GYNECOLOGY

## 2020-01-13 PROCEDURE — 88305 TISSUE EXAM BY PATHOLOGIST: CPT | Performed by: OBSTETRICS & GYNECOLOGY

## 2020-01-13 PROCEDURE — 37000008 ZZH ANESTHESIA TECHNICAL FEE, 1ST 30 MIN: Performed by: OBSTETRICS & GYNECOLOGY

## 2020-01-13 PROCEDURE — 25000125 ZZHC RX 250: Performed by: NURSE ANESTHETIST, CERTIFIED REGISTERED

## 2020-01-13 RX ORDER — PROPOFOL 10 MG/ML
INJECTION, EMULSION INTRAVENOUS CONTINUOUS PRN
Status: DISCONTINUED | OUTPATIENT
Start: 2020-01-13 | End: 2020-01-13

## 2020-01-13 RX ORDER — ONDANSETRON 2 MG/ML
INJECTION INTRAMUSCULAR; INTRAVENOUS PRN
Status: DISCONTINUED | OUTPATIENT
Start: 2020-01-13 | End: 2020-01-13

## 2020-01-13 RX ORDER — LIDOCAINE HYDROCHLORIDE 10 MG/ML
INJECTION, SOLUTION INFILTRATION; PERINEURAL PRN
Status: DISCONTINUED | OUTPATIENT
Start: 2020-01-13 | End: 2020-01-13

## 2020-01-13 RX ORDER — LIDOCAINE HYDROCHLORIDE 10 MG/ML
INJECTION, SOLUTION EPIDURAL; INFILTRATION; INTRACAUDAL; PERINEURAL PRN
Status: DISCONTINUED | OUTPATIENT
Start: 2020-01-13 | End: 2020-01-13 | Stop reason: HOSPADM

## 2020-01-13 RX ORDER — FENTANYL CITRATE 50 UG/ML
25-50 INJECTION, SOLUTION INTRAMUSCULAR; INTRAVENOUS
Status: DISCONTINUED | OUTPATIENT
Start: 2020-01-13 | End: 2020-01-13 | Stop reason: HOSPADM

## 2020-01-13 RX ORDER — KETAMINE HYDROCHLORIDE 10 MG/ML
INJECTION INTRAMUSCULAR; INTRAVENOUS PRN
Status: DISCONTINUED | OUTPATIENT
Start: 2020-01-13 | End: 2020-01-13

## 2020-01-13 RX ORDER — MEPERIDINE HYDROCHLORIDE 25 MG/ML
12.5 INJECTION INTRAMUSCULAR; INTRAVENOUS; SUBCUTANEOUS
Status: DISCONTINUED | OUTPATIENT
Start: 2020-01-13 | End: 2020-01-13 | Stop reason: HOSPADM

## 2020-01-13 RX ORDER — ONDANSETRON 2 MG/ML
4 INJECTION INTRAMUSCULAR; INTRAVENOUS EVERY 30 MIN PRN
Status: DISCONTINUED | OUTPATIENT
Start: 2020-01-13 | End: 2020-01-13 | Stop reason: HOSPADM

## 2020-01-13 RX ORDER — IBUPROFEN 600 MG/1
600 TABLET, FILM COATED ORAL
Status: DISCONTINUED | OUTPATIENT
Start: 2020-01-13 | End: 2020-01-13 | Stop reason: HOSPADM

## 2020-01-13 RX ORDER — SODIUM CHLORIDE, SODIUM LACTATE, POTASSIUM CHLORIDE, CALCIUM CHLORIDE 600; 310; 30; 20 MG/100ML; MG/100ML; MG/100ML; MG/100ML
INJECTION, SOLUTION INTRAVENOUS CONTINUOUS
Status: DISCONTINUED | OUTPATIENT
Start: 2020-01-13 | End: 2020-01-13 | Stop reason: HOSPADM

## 2020-01-13 RX ORDER — FENTANYL CITRATE 50 UG/ML
INJECTION, SOLUTION INTRAMUSCULAR; INTRAVENOUS PRN
Status: DISCONTINUED | OUTPATIENT
Start: 2020-01-13 | End: 2020-01-13

## 2020-01-13 RX ORDER — GLYCOPYRROLATE 0.2 MG/ML
INJECTION, SOLUTION INTRAMUSCULAR; INTRAVENOUS PRN
Status: DISCONTINUED | OUTPATIENT
Start: 2020-01-13 | End: 2020-01-13

## 2020-01-13 RX ORDER — NALOXONE HYDROCHLORIDE 0.4 MG/ML
.1-.4 INJECTION, SOLUTION INTRAMUSCULAR; INTRAVENOUS; SUBCUTANEOUS
Status: DISCONTINUED | OUTPATIENT
Start: 2020-01-13 | End: 2020-01-13 | Stop reason: HOSPADM

## 2020-01-13 RX ORDER — ONDANSETRON 4 MG/1
4 TABLET, ORALLY DISINTEGRATING ORAL EVERY 30 MIN PRN
Status: DISCONTINUED | OUTPATIENT
Start: 2020-01-13 | End: 2020-01-13 | Stop reason: HOSPADM

## 2020-01-13 RX ORDER — KETOROLAC TROMETHAMINE 30 MG/ML
INJECTION, SOLUTION INTRAMUSCULAR; INTRAVENOUS PRN
Status: DISCONTINUED | OUTPATIENT
Start: 2020-01-13 | End: 2020-01-13

## 2020-01-13 RX ORDER — IBUPROFEN 600 MG/1
600 TABLET, FILM COATED ORAL EVERY 6 HOURS PRN
Qty: 30 TABLET | Refills: 0 | Status: ON HOLD | OUTPATIENT
Start: 2020-01-13 | End: 2020-09-29

## 2020-01-13 RX ORDER — ACETAMINOPHEN 325 MG/1
975 TABLET ORAL ONCE
Status: DISCONTINUED | OUTPATIENT
Start: 2020-01-13 | End: 2020-01-13 | Stop reason: HOSPADM

## 2020-01-13 RX ORDER — LIDOCAINE 40 MG/G
CREAM TOPICAL
Status: DISCONTINUED | OUTPATIENT
Start: 2020-01-13 | End: 2020-01-13 | Stop reason: HOSPADM

## 2020-01-13 RX ORDER — DEXAMETHASONE SODIUM PHOSPHATE 4 MG/ML
INJECTION, SOLUTION INTRA-ARTICULAR; INTRALESIONAL; INTRAMUSCULAR; INTRAVENOUS; SOFT TISSUE PRN
Status: DISCONTINUED | OUTPATIENT
Start: 2020-01-13 | End: 2020-01-13

## 2020-01-13 RX ADMIN — Medication 10 MG: at 07:35

## 2020-01-13 RX ADMIN — LIDOCAINE HYDROCHLORIDE 30 MG: 10 INJECTION, SOLUTION INFILTRATION; PERINEURAL at 07:35

## 2020-01-13 RX ADMIN — Medication 10 MG: at 07:53

## 2020-01-13 RX ADMIN — GLYCOPYRROLATE 0.2 MG: 0.2 INJECTION, SOLUTION INTRAMUSCULAR; INTRAVENOUS at 07:30

## 2020-01-13 RX ADMIN — DEXAMETHASONE SODIUM PHOSPHATE 4 MG: 4 INJECTION, SOLUTION INTRA-ARTICULAR; INTRALESIONAL; INTRAMUSCULAR; INTRAVENOUS; SOFT TISSUE at 07:40

## 2020-01-13 RX ADMIN — SODIUM CHLORIDE, POTASSIUM CHLORIDE, SODIUM LACTATE AND CALCIUM CHLORIDE: 600; 310; 30; 20 INJECTION, SOLUTION INTRAVENOUS at 07:30

## 2020-01-13 RX ADMIN — KETOROLAC TROMETHAMINE 30 MG: 30 INJECTION, SOLUTION INTRAMUSCULAR at 08:15

## 2020-01-13 RX ADMIN — FENTANYL CITRATE 25 MCG: 50 INJECTION, SOLUTION INTRAMUSCULAR; INTRAVENOUS at 07:46

## 2020-01-13 RX ADMIN — FENTANYL CITRATE 50 MCG: 50 INJECTION, SOLUTION INTRAMUSCULAR; INTRAVENOUS at 07:35

## 2020-01-13 RX ADMIN — MIDAZOLAM 2 MG: 1 INJECTION INTRAMUSCULAR; INTRAVENOUS at 07:30

## 2020-01-13 RX ADMIN — FENTANYL CITRATE 50 MCG: 50 INJECTION, SOLUTION INTRAMUSCULAR; INTRAVENOUS at 08:15

## 2020-01-13 RX ADMIN — FENTANYL CITRATE 25 MCG: 50 INJECTION, SOLUTION INTRAMUSCULAR; INTRAVENOUS at 07:51

## 2020-01-13 RX ADMIN — FENTANYL CITRATE 50 MCG: 50 INJECTION, SOLUTION INTRAMUSCULAR; INTRAVENOUS at 08:09

## 2020-01-13 RX ADMIN — PROPOFOL 50 MCG/KG/MIN: 10 INJECTION, EMULSION INTRAVENOUS at 07:35

## 2020-01-13 RX ADMIN — ONDANSETRON HYDROCHLORIDE 4 MG: 2 INJECTION, SOLUTION INTRAVENOUS at 07:40

## 2020-01-13 ASSESSMENT — MIFFLIN-ST. JEOR: SCORE: 1478.28

## 2020-01-13 NOTE — ANESTHESIA PREPROCEDURE EVALUATION
Anesthesia Pre-Procedure Evaluation    Patient: Amy Clemens   MRN: [id number] : 1980          Preoperative Diagnosis: Thickened endometrium [R93.89]    Procedure(s):  hysteroscopy, dilation and curettage, possible polypectomy with myosure    Past Medical History:   Diagnosis Date     Endometriosis      Gastroesophageal reflux disease      Thyroid disease      Tubal occlusion      Past Surgical History:   Procedure Laterality Date     CHOLECYSTECTOMY       DILATION AND CURETTAGE, OPERATIVE HYSTEROSCOPY WITH MORCELLATOR, COMBINED N/A 2019    Procedure: hysteroscopy, dilation and curettage, polypectomy with myosure;  Surgeon: Floresita Zimmerman MD;  Location: [facility] OR     GYN SURGERY      tubal ligation     Anesthesia Evaluation     . Pt has had prior anesthetic. Type: General    No history of anesthetic complications          ROS/MED HX    ENT/Pulmonary:  - neg pulmonary ROS     Neurologic:  - neg neurologic ROS     Cardiovascular:  - neg cardiovascular ROS       METS/Exercise Tolerance:     Hematologic:  - neg hematologic  ROS       Musculoskeletal:  - neg musculoskeletal ROS       GI/Hepatic:     (+) GERD Asymptomatic on medication,       Renal/Genitourinary:         Endo:     (+) Obesity, .      Psychiatric:  - neg psychiatric ROS       Infectious Disease:  - neg infectious disease ROS       Malignancy:         Other:    - neg other ROS                      Physical Exam  Normal systems: cardiovascular, pulmonary and dental    Airway   Mallampati: II  TM distance: >3 FB  Neck ROM: full    Dental     Cardiovascular       Pulmonary             Lab Results   Component Value Date    WBC 7.5 2018    HGB 13.2 2018    HCT 39.4 2018     2018    SED 7 10/15/2016     2018    POTASSIUM 3.3 (L) 2018    CHLORIDE 108 2018    CO2 29 2018    BUN 18 2018    CR 0.53 2018     (H) 2018    DAVID 8.0 (L) 2018    ALBUMIN 3.6 2018  "   PROTTOTAL 7.0 03/11/2018    ALT 27 03/11/2018    AST 14 03/11/2018    ALKPHOS 80 03/11/2018    BILITOTAL 0.2 03/11/2018    LIPASE 164 03/11/2018    HCG Negative 02/07/2019       Preop Vitals  BP Readings from Last 3 Encounters:   01/13/20 112/72   02/07/19 112/68   12/23/18 120/73    Pulse Readings from Last 3 Encounters:   01/13/20 67   02/07/19 63   12/23/18 77      Resp Readings from Last 3 Encounters:   01/13/20 18   02/07/19 16   12/23/18 16    SpO2 Readings from Last 3 Encounters:   01/13/20 98%   02/07/19 100%   12/23/18 99%      Temp Readings from Last 1 Encounters:   01/13/20 97.9  F (36.6  C) (Temporal)    Ht Readings from Last 1 Encounters:   01/13/20 1.6 m (5' 3\")      Wt Readings from Last 1 Encounters:   01/13/20 83.9 kg (185 lb)    Estimated body mass index is 32.77 kg/m  as calculated from the following:    Height as of this encounter: 1.6 m (5' 3\").    Weight as of this encounter: 83.9 kg (185 lb).       Anesthesia Plan      History & Physical Review  History and physical reviewed and following examination; no interval change.    ASA Status:  2 .    NPO Status:  > 8 hours    Plan for MAC with Intravenous induction. Maintenance will be TIVA.  Reason for MAC:  Deep or markedly invasive procedure (G8)  PONV prophylaxis:  Ondansetron (or other 5HT-3) and Dexamethasone or Solumedrol       Postoperative Care  Postoperative pain management:  Oral pain medications, IV analgesics and Multi-modal analgesia.      Consents  Anesthetic plan, risks, benefits and alternatives discussed with:  Patient..                 Matt Pacheco MD                    .  "

## 2020-01-13 NOTE — DISCHARGE INSTRUCTIONS
DR. MARIA C LOREDO M.D.  [facility]   CLINIC PHONE NUMBER:  [phone]        GENERAL ANESTHESIA OR SEDATION ADULT   DISCHARGE INSTRUCTIONS SPECIAL PRECAUTIONS FOR 24 HOURS AFTER SURGERY    IT IS NOT UNUSUAL TO FEEL LIGHT-HEADED OR FAINT, UP TO 24 HOURS AFTER SURGERY OR WHILE TAKING PAIN MEDICATION.  IF YOU HAVE THESE SYMPTOMS; SIT FOR A FEW MINUTES BEFORE STANDING AND HAVE SOMEONE ASSIST YOU WHEN YOU GET UP TO WALK OR USE THE BATHROOM.    YOU SHOULD REST AND RELAX FOR THE NEXT 24 HOURS AND YOU MUST MAKE ARRANGEMENTS TO HAVE SOMEONE STAY WITH YOU FOR AT LEAST 24 HOURS AFTER YOUR DISCHARGE.  AVOID HAZARDOUS AND STRENUOUS ACTIVITIES.  DO NOT MAKE IMPORTANT DECISIONS FOR 24 HOURS.    DO NOT DRIVE ANY VEHICLE OR OPERATE MECHANICAL EQUIPMENT FOR 24 HOURS FOLLOWING THE END OF YOUR SURGERY.  EVEN THOUGH YOU MAY FEEL NORMAL, YOUR REACTIONS MAY BE AFFECTED BY THE MEDICATION YOU HAVE RECEIVED.    DO NOT DRINK ALCOHOLIC BEVERAGES FOR 24 HOURS FOLLOWING YOUR SURGERY.    DRINK CLEAR LIQUIDS (APPLE JUICE, GINGER ALE, 7-UP, BROTH, ETC.).  PROGRESS TO YOUR REGULAR DIET AS YOU FEEL ABLE.    YOU MAY HAVE A DRY MOUTH, A SORE THROAT, MUSCLES ACHES OR TROUBLE SLEEPING.  THESE SHOULD GO AWAY AFTER 24 HOURS.    CALL YOUR DOCTOR FOR ANY OF THE FOLLOWING:  SIGNS OF INFECTION (FEVER, GROWING TENDERNESS AT THE SURGERY SITE, A LARGE AMOUNT OF DRAINAGE OR BLEEDING, SEVERE PAIN, FOUL-SMELLING DRAINAGE, REDNESS OR SWELLING.    IT HAS BEEN OVER 8 TO 10 HOURS SINCE SURGERY AND YOU ARE STILL NOT ABLE TO URINATE (PASS WATER).       DILATION AND CURETTAGE  DISCHARGE INSTRUCTIONS    DO NOT DRIVE A CAR, DRINK ALCOHOL OR USE MACHINERY FOR THE NEXT 24 HOURS.  YOU SHOULD WAIT UNTIL YOU HAVE RECOVERED BEFORE MAKING ANY IMPORTANT DECISIONS.    PAIN AND DISCOMFORT  YOU MAY HAVE CRAMPS OR A LOW BACKACHE FOR 24 TO 48 HOURS.  TYLENOL (ACETAMINOPHEN) OR MOTRIN (IBUPROFEN) MAY HELP, OR YOUR DOCTOR MAY GIVE YOU PAIN MEDICINE.  CALL YOUR DOCTOR IF  PAIN CANNOT BE CONTROLLED.  YOU MAY FEEL DROWSY AND WEAK FOR A DAY OR TWO.    VAGINAL DISCHARGE  YOU MAY HAVE SOME BLEEDING OR DISCHARGE FOR UP TO TWO WEEKS.  DO NOT DOUCHE, USE TAMPONS OR HAVE SEX (INTERCOURSE) IN THE FIRST WEEK.  CALL YOUR DOCTOR IF YOU SOAK MORE THAN ONE MAXI PAD (SANITARY NAPKIN) PER HOUR, OR IF YOU PASS LARGE BLOOD CLOTS.    OTHER SYMPTOMS  YOU MAY HAVE A LOW FEVER FOR THE FIRST TWO DAYS.  CALL YOUR DOCTOR IF YOUR FEVER GOES OVER 101 DEGREES FAHRENHEIT.    IF YOU HAVE NAUSEA (FEEL SICK TO YOUR STOMACH), STAY IN BED.  TRY DRINKING A SMALL AMOUNT 7-UP, TEA OR SOUP.    DIET AND ACTIVITY  EAT LIGHT MEALS AND DRINK PLENTY OF FLUIDS FOR THE FIRST 24 HOURS (OR LONGER, IF YOU HAVE NAUSEA).    YOU MAY BATHE, SHOWER AND CLIMB STAIRS.  MOST WOMEN CAN RETURN TO WORK AFTER 24 HOURS.  YOU MAY GO BACK TO YOUR OTHER ACTIVITIES AFTER YOUR PAIN GOES AWAY.          **You received Toradol, an IV form of ibuprofen (Motrin) at 0815.  Do not take any ibuprofen products until 2:15 PM.

## 2020-01-13 NOTE — ANESTHESIA CARE TRANSFER NOTE
Patient: Amy Clemens    Procedure(s):  hysteroscopy, dilation and curettage, possible polypectomy with myosure    Diagnosis: Thickened endometrium [R93.89]  Diagnosis Additional Information: No value filed.    Anesthesia Type:   MAC     Note:  Airway :Face Mask  Patient transferred to:Phase II  Handoff Report: Identifed the Patient, Identified the Reponsible Provider, Reviewed the pertinent medical history, Discussed the surgical course, Reviewed Intra-OP anesthesia mangement and issues during anesthesia, Set expectations for post-procedure period and Allowed opportunity for questions and acknowledgement of understanding      Vitals: (Last set prior to Anesthesia Care Transfer)    CRNA VITALS  1/13/2020 0740 - 1/13/2020 0817      1/13/2020             Pulse:  73    SpO2:  98 %                Electronically Signed By: KYREI Hjai CRNA  January 13, 2020  8:17 AM

## 2020-01-13 NOTE — OP NOTE
[facility] Operative Note  Hysteroscopy, Dilation and Curettage    Date of Surgery: 1/13/2020  Patient: Amy Clemens  MRN: [id number]    Pre-operative diagnosis: History of tubal ligation  Planning IVF with frozen embryo transfer  Thickened endometrium  History of endometrial polyp   Post-operative diagnosis: Same  Endometrial polyp   Procedure: Hysteroscopy, dilation and curettage, endometrial polypectomy   Surgeon: Floresita Zimmerman MD   Anesthesia: MAC (monitored anesthesia care)   Estimated blood loss: 10 mL   Total IV fluids: 700ml   Blood transfusion: No transfusion was given during surgery   Total urine output: 25 mL   Drains: None   Specimens: Endometrial polyp and endometrial curetting   Findings: There are several polypoid lesions and the anterior uterine wall. Tubal ostia are visualized bilaterally. No other abnormalities are noted.   Complications: None   Condition: Stable       Indications: Amy Clemens is a 39 year old  who is currently under the care of Dr. Ribera at [facility] prepping for a cycle of IVF with plans for frozen embryo transfer secondary to history of tubal ligation who was recently noted to have a thickened endometrium during a lining check. Given this, her lining was not felt suitable for embryo transfer and there was also concern for possible endometrial polyp particularly given that patient does have a history of polyps. Given this, patient was referred back to me to discuss treatment options. Above listed procedure was recommended for evaluation and management. Risks, benefits, and alternatives were reviewed and informed consent was signed.    Procedure in detail: The patient was taken to the OR where MAC anesthesia was administered without difficulty. She was placed in the dorsal lithotomy position with Curtis stirrups. The patient was then prepped and draped in usual sterile fashion.    An open-sided speculum was inserted in the vagina and the cervix was brought into  view. 1% lidocaine plain was injected into the anterior cervical lip. A single-toothed tenaculum was used to grasp the anterior lip of the cervix. A paracervical block was then performed using 1% lidocaine plain. The cervical os was sequentially dilated to accommodate the Myosure hysteroscope using Hegar dilators. The hysteroscope was then introduced into the uterus under direct visualization and the uterus was distended with normal saline. The inside of the uterus was then visualized and findings are as noted above. The Myosure was introduced and used to resect all of the polyps in their entirety. A visual circumferential curettage of the entire uterus was then completed. The hysteroscope was then withdrawn and a sharp curette was introduced. The uterus was curetted in all quadrants until a gritty feeling was noted in all aspects of the uterus. The endometrial scrapings were sent to pathology. The tenaculum was removed from the cervix and good hemostasis was noted at the puncture sites.     The patient tolerated the procedure well. Instrument and sponge counts were correct x 2. The patient was transported to the recovery room in stable condition.    Floresita Zimmerman MD, FACOG [facility]  1/13/2020 7:31 AM

## 2020-01-14 LAB — COPATH REPORT: NORMAL

## 2020-04-10 ENCOUNTER — HOSPITAL ENCOUNTER (EMERGENCY)
Facility: CLINIC | Age: 40
Discharge: HOME | End: 2020-04-10
Attending: PHYSICIAN ASSISTANT | Admitting: PHYSICIAN ASSISTANT
Payer: COMMERCIAL

## 2020-04-10 ENCOUNTER — APPOINTMENT (OUTPATIENT)
Dept: ULTRASOUND IMAGING | Facility: CLINIC | Age: 40
End: 2020-04-10
Attending: PHYSICIAN ASSISTANT
Payer: COMMERCIAL

## 2020-04-10 VITALS
TEMPERATURE: 99.1 F | OXYGEN SATURATION: 100 % | SYSTOLIC BLOOD PRESSURE: 125 MMHG | HEART RATE: 84 BPM | RESPIRATION RATE: 16 BRPM | DIASTOLIC BLOOD PRESSURE: 64 MMHG

## 2020-04-10 DIAGNOSIS — D50.0 ANEMIA DUE TO BLOOD LOSS: ICD-10-CM

## 2020-04-10 DIAGNOSIS — N93.9 VAGINAL BLEEDING: ICD-10-CM

## 2020-04-10 LAB
ABO + RH BLD: NORMAL
ABO + RH BLD: NORMAL
ANION GAP SERPL CALCULATED.3IONS-SCNC: 5 MMOL/L (ref 3–14)
B-HCG SERPL-ACNC: 1111 IU/L (ref 0–5)
BASOPHILS # BLD AUTO: 0 10E9/L (ref 0–0.2)
BASOPHILS NFR BLD AUTO: 0.4 %
BLD GP AB SCN SERPL QL: NORMAL
BLOOD BANK CMNT PATIENT-IMP: NORMAL
BUN SERPL-MCNC: 11 MG/DL (ref 7–30)
CALCIUM SERPL-MCNC: 8.5 MG/DL (ref 8.5–10.1)
CHLORIDE SERPL-SCNC: 108 MMOL/L (ref 94–109)
CO2 SERPL-SCNC: 24 MMOL/L (ref 20–32)
CREAT SERPL-MCNC: 0.48 MG/DL (ref 0.52–1.04)
DIFFERENTIAL METHOD BLD: ABNORMAL
EOSINOPHIL # BLD AUTO: 0.3 10E9/L (ref 0–0.7)
EOSINOPHIL NFR BLD AUTO: 3.4 %
ERYTHROCYTE [DISTWIDTH] IN BLOOD BY AUTOMATED COUNT: 13.6 % (ref 10–15)
GFR SERPL CREATININE-BSD FRML MDRD: >90 ML/MIN/{1.73_M2}
GLUCOSE SERPL-MCNC: 101 MG/DL (ref 70–99)
HCT VFR BLD AUTO: 33.2 % (ref 35–47)
HGB BLD-MCNC: 10.7 G/DL (ref 11.7–15.7)
IMM GRANULOCYTES # BLD: 0 10E9/L (ref 0–0.4)
IMM GRANULOCYTES NFR BLD: 0.5 %
INR PPP: 1 (ref 0.86–1.14)
LYMPHOCYTES # BLD AUTO: 1.6 10E9/L (ref 0.8–5.3)
LYMPHOCYTES NFR BLD AUTO: 19.7 %
MCH RBC QN AUTO: 30.5 PG (ref 26.5–33)
MCHC RBC AUTO-ENTMCNC: 32.2 G/DL (ref 31.5–36.5)
MCV RBC AUTO: 95 FL (ref 78–100)
MONOCYTES # BLD AUTO: 0.7 10E9/L (ref 0–1.3)
MONOCYTES NFR BLD AUTO: 7.9 %
NEUTROPHILS # BLD AUTO: 5.6 10E9/L (ref 1.6–8.3)
NEUTROPHILS NFR BLD AUTO: 68.1 %
NRBC # BLD AUTO: 0 10*3/UL
NRBC BLD AUTO-RTO: 0 /100
PLATELET # BLD AUTO: 308 10E9/L (ref 150–450)
POTASSIUM SERPL-SCNC: 3.6 MMOL/L (ref 3.4–5.3)
RBC # BLD AUTO: 3.51 10E12/L (ref 3.8–5.2)
SODIUM SERPL-SCNC: 137 MMOL/L (ref 133–144)
SPECIMEN EXP DATE BLD: NORMAL
TSH SERPL DL<=0.005 MIU/L-ACNC: 1.2 MU/L (ref 0.4–4)
WBC # BLD AUTO: 8.3 10E9/L (ref 4–11)

## 2020-04-10 PROCEDURE — 96374 THER/PROPH/DIAG INJ IV PUSH: CPT

## 2020-04-10 PROCEDURE — 84702 CHORIONIC GONADOTROPIN TEST: CPT | Performed by: PHYSICIAN ASSISTANT

## 2020-04-10 PROCEDURE — 85610 PROTHROMBIN TIME: CPT | Performed by: PHYSICIAN ASSISTANT

## 2020-04-10 PROCEDURE — 76817 TRANSVAGINAL US OBSTETRIC: CPT

## 2020-04-10 PROCEDURE — 86901 BLOOD TYPING SEROLOGIC RH(D): CPT | Performed by: PHYSICIAN ASSISTANT

## 2020-04-10 PROCEDURE — 86900 BLOOD TYPING SEROLOGIC ABO: CPT | Performed by: PHYSICIAN ASSISTANT

## 2020-04-10 PROCEDURE — 25000128 H RX IP 250 OP 636: Performed by: PHYSICIAN ASSISTANT

## 2020-04-10 PROCEDURE — 99284 EMERGENCY DEPT VISIT MOD MDM: CPT

## 2020-04-10 PROCEDURE — 80048 BASIC METABOLIC PNL TOTAL CA: CPT | Performed by: PHYSICIAN ASSISTANT

## 2020-04-10 PROCEDURE — 86850 RBC ANTIBODY SCREEN: CPT | Performed by: PHYSICIAN ASSISTANT

## 2020-04-10 PROCEDURE — 84443 ASSAY THYROID STIM HORMONE: CPT | Performed by: PHYSICIAN ASSISTANT

## 2020-04-10 PROCEDURE — 85025 COMPLETE CBC W/AUTO DIFF WBC: CPT | Performed by: PHYSICIAN ASSISTANT

## 2020-04-10 RX ORDER — METHYLERGONOVINE MALEATE 0.2 MG/ML
200 INJECTION INTRAVENOUS ONCE
Status: COMPLETED | OUTPATIENT
Start: 2020-04-10 | End: 2020-04-10

## 2020-04-10 RX ORDER — METHYLERGONOVINE MALEATE 0.2 MG/1
0.2 TABLET ORAL 3 TIMES DAILY
Qty: 9 TABLET | Refills: 0 | Status: SHIPPED | OUTPATIENT
Start: 2020-04-10 | End: 2020-04-13

## 2020-04-10 RX ADMIN — METHYLERGONOVINE MALEATE 200 MCG: 0.2 INJECTION, SOLUTION INTRAMUSCULAR; INTRAVENOUS at 18:39

## 2020-04-10 ASSESSMENT — ENCOUNTER SYMPTOMS
ABDOMINAL PAIN: 1
LIGHT-HEADEDNESS: 1
VOMITING: 0
BACK PAIN: 1
NAUSEA: 0

## 2020-04-10 NOTE — ED PROVIDER NOTES
"  History     Chief Complaint:  Vaginal Bleeding    The history is provided by the patient.      Amy Clemens is a  40 year old female s/p hysteroscopy on 2020, with a history of recent miscarriage on 2020 who presents with vaginal bleeding that began earlier this afternoon. On 2020 she went to see her OBGYN for an 8 week pregnancy check-up where an ultrasound was preformed and no fetal heartbeat was detected, and subsequently diagnosed with miscarriage. At this visit she was given Misoprostol to be inserted vaginally. The patient states that after she started taking this she has been bleeding intermittently, which she describes as similar to getting her period. This afternoon, about 30 minutes prior to her arrival at the ED, she came back home after running errands and felt a large gushing of blood after standing up that \"felt much more than normal\", and has steadily continued since. The patient currently reports some mild pain on the right side of her abdomen and on her back, in addition to feeling slightly light headed and cotton-mouthed that began a few minutes ago. She states she has not having lifted anything heavy, and denies any fever or nausea. She further denies having a history of bleeding disorders, and is not currently taking any blood thinners.    Of note, she used Misoprostol back in October and said she didn't have any bleeding like that which she is currently experiencing. She follows her OBGYN care with [facility].  Per review of her records, her Hgb on 3/26/20 was 14.2.     Ultrasound 2020:  1. Pregnancy of unknown location. Diagnostic considerations include an early IUP, completed spontaneous  or occult ectopic pregnancy. Recommend serial follow up beta HCG's and follow up ultrasound as clinically indicated.  2. Small anechoic focus in the endometrium, which could represent an early gestation sac or pseudo-gestational sac in the setting of ectopic " pregnancy. Recommend attention on follow-up.  3. 3.5 cm cystic structure in the right ovary without significant solid component or surrounding hypervascularity. This is favored to represent a functional cyst/follicle.    Allergies:  No Known Drug Allergies    Medications:    Synthroid  Ortho-Novum  Protonix    Past Medical History:    Hypothyroidism  Vitamin D deficiency  Irregular menses  TMJ  Endometriosis  GERD  Tubal Occlusion    Past Surgical History:    Cholecystectomy  Dilation and Curettage, Operative Hysteroscopy with Morcellator, combined x2  Tubal Ligation    Family History:    Hyperlipidemia  Diabetes  Cerebrovascular disease  Stroke  HTN    Social History:  The patient was not accompanied to the ED  Smoking Status: Never  Smokeless Tobacco: Never  Alcohol Use: No  Drug Use: No  Marital Status:      Review of Systems   HENT:        Slight cotton-mouth   Gastrointestinal: Positive for abdominal pain (Right side). Negative for nausea and vomiting.   Genitourinary: Positive for vaginal bleeding.   Musculoskeletal: Positive for back pain.   Neurological: Positive for light-headedness.   All other systems reviewed and are negative.    Physical Exam     Patient Vitals for the past 24 hrs:   BP Temp Temp src Pulse Resp SpO2   04/10/20 1630 -- -- -- 87 -- 100 %   04/10/20 1610 134/70 99.1  F (37.3  C) Oral 103 16 100 %       Physical Exam  General: Alert and cooperative with exam. Resting comfortably on gurney  Head:  Scalp is NC/AT  Eyes:  No scleral icterus, PERRL  ENT:  The external nose and ears are normal.   Neck:  Normal range of motion without rigidity.  CV:  Regular rate and rhythm    No pathologic murmur, rubs, or gallops.  Resp:  Breath sounds are clear bilaterally.  No crackles, wheezes, rhonchi, stridor.    Non-labored, no retractions or accessory muscle use  GI:  Abdomen is soft, no distension, no tenderness, no masses. No peritoneal signs.  Bowel sounds present in all quadrants  :  No  suprapubic or flank tenderness  Pelvic:  Normal external genitalia. Significant visualized bleeding with large clots. Improved following clot removal. Cervix appears closed. (Performed in presence of female chaperone Amber LENZ)  MS:  No lower extremity edema or asymmetric calf swelling.  Skin:  Warm and dry, No rash or lesions noted.  Neuro: Oriented. No gross motor deficits.    GCS: 15.   Psych: Awake. Alert. Normal affect. Appropriate interactions.    Emergency Department Course   Imaging:  Radiology findings were communicated with the patient who voiced understanding of the findings.    US OB <14 Weeks w Transvaginal:  No intrauterine gestation identified. Heterogeneous endometrium. Recommend following serial beta hCGs.  Report per radiology     Laboratory:  Laboratory findings were communicated with the patient who voiced understanding of the findings.    CBC: WBC 8.3, HGB 10.7 (L),   BMP: Glucose 101 (H), Creatinine 0.48 (L) o/w WNL  ABO/Rh type and screen: O Neg, RH(D) Pos  TSH with free T4 reflex: 1.20  INR: 1.00    HCG Quantitative: 1,111 (H)    Procedures  None.    Interventions:  1839: Methergine 200 mcg IV    Emergency Department Course:  Past medical records, nursing notes, and vitals reviewed.    1600: I performed an exam of the patient as documented above.     IV was inserted and blood was drawn for laboratory testing, results above.    The patient was sent for an OB Ultrasound while in the emergency department, results above.     1820: I rechecked the patient and discussed the results of her workup thus far. The patient states she is still leaking some but overall the bleeding has significantly improved.    1821: I spoke with Dr. Stevens of the OBGYN service from regarding patient's presentation, findings, and plan of care.    1830: I rechecked the patient and updated her on the plan.    Findings and plan explained to the Patient. Patient discharged home with instructions regarding supportive  care, medications, and reasons to return. The importance of close follow-up was reviewed. The patient was prescribed Methergine.    I personally reviewed the laboratory and imaging results with the Patient and answered all related questions prior to discharge.     Impression & Plan   Medical Decision Makin-year-old female status post missed  on 3/30/2020 who presents with episode of heavy vaginal bleeding.  History and records reviewed.  She was treated with misoprostol, and had initially passed some large clots before slowing down and then having an episode of heavier bleeding today.  On examination, she is well-appearing and vitally stable.  Work-up here demonstrates mild anemia with hemoglobin of 10.7, decreased from recent value in .  She initially did have some heavy bleeding, but following clot removal this improved significantly.  Ultrasound demonstrates heterogeneous uterus, but no other retained products, intrauterine pregnancy, or abnormalities.  She has an otherwise benign abdominal examination with no tenderness to palpation suggest intra-abdominal catastrophe.  She has no fever, and a normal white blood cell count and no evidence of endometritis or PID.  The remainder of her lab work-up shows normal platelets, INR, electrolytes, kidney function, TSH.  Discussed with OB/GYN, who recommended dose of Methergine here in the ED followed by 3 days of this.  She understands that this may lead to passage of some additional clot. OB feel hemoglobin drop can be normal in the setting of miscarriage, and will see the patient in clinic this week for repeat hemoglobin.  The patient was able to ambulate here in the clinic without significant dizziness or difficulty.  She will return if new or worsening symptoms including significant increase in bleeding, dizziness, syncope, chest pain, shortness of breath, increased pain, or other new or worsening symptoms.  Diagnosis:    ICD-10-CM    1.  Vaginal bleeding  N93.9    2. Anemia due to blood loss  D50.0        Disposition:  Discharged to home.    Discharge Medications:  New Prescriptions    METHYLERGONOVINE (METHERGINE) 0.2 MG TABLET    Take 1 tablet (200 mcg) by mouth 3 times daily for 3 days       Scribe Disclosure:  I, Lara Caicedo, am serving as a scribe at 4:00 PM on 4/10/2020 to document services personally performed by Francisco Nichols, based on my observations and the provider's statements to me.     I, Gema Lopez, am serving as a scribe at 5:45 PM on 4/10/2020 to document services personally performed by Francisco Nichols, based on my observations and the provider's statements to me.       Lara Caicedo  4/10/2020   [facility] EMERGENCY DEPARTMENT       Francisco Nichols PA-C  04/10/20 1902

## 2020-04-10 NOTE — ED TRIAGE NOTES
Pt presents to ED with c/o vaginal bleeding. PT has US on the 30th. Pt was 8wks pregnant at that point, but no heartbeat was detected. That day, pt was given Rx for vaginal misoprostol. Pt states that she began bleeding that day. Pt has been bleeding every day since taking the misoprostol, but amount was varying. Pt states that within the last hour, she was standing in her kitchen and felt she was having a large amount of bleeding all at once - the most she has experienced so far. ABC intact. A/O x4.

## 2020-04-10 NOTE — ED AVS SNAPSHOT
[facility] Emergency Department  [address]  [facility] [place] [address]  Phone:  [phone]  Fax:  [phone]                                    Amy Clemens   MRN: [id number]    Department:  [facility] Emergency Department   Date of Visit:  4/10/2020           After Visit Summary Signature Page    I have received my discharge instructions, and my questions have been answered. I have discussed any challenges I see with this plan with the nurse or doctor.    ..........................................................................................................................................  Patient/Patient Representative Signature      ..........................................................................................................................................  Patient Representative Print Name and Relationship to Patient    ..................................................               ................................................  Date                                   Time    ..........................................................................................................................................  Reviewed by Signature/Title    ...................................................              ..............................................  Date                                               Time          22EPIC Rev 08/18

## 2020-08-19 ENCOUNTER — TRANSFERRED RECORDS (OUTPATIENT)
Dept: HEALTH INFORMATION MANAGEMENT | Facility: CLINIC | Age: 40
End: 2020-08-19

## 2020-08-19 DIAGNOSIS — Z11.59 ENCOUNTER FOR SCREENING FOR OTHER VIRAL DISEASES: Primary | ICD-10-CM

## 2020-08-19 LAB
C TRACH DNA SPEC QL PROBE+SIG AMP: NOT DETECTED
HPV ABSTRACT: ABNORMAL
N GONORRHOEA DNA SPEC QL PROBE+SIG AMP: NOT DETECTED
PAP-ABSTRACT: NORMAL
SPECIMEN DESCRIP: NORMAL
SPECIMEN DESCRIPTION: NORMAL

## 2020-09-03 NOTE — PROGRESS NOTES
SUBJECTIVE:                                                   Amy Clemens is a 40 year old female who presents to clinic today for the following health issue(s):  Patient presents with:  Consult: discuss tubal reversal      HPI: The patient is a 40-year-old who is seen at this time for reconsultation about possible tubal reversal.  She has had a previous HSG that showed adequate proximal segments.  She went through 2 IVF cycles and miscarried both times.  She is having regular cycles that have become heavier and she has a endometrial polyp present.  She is scheduled for a hysteroscopy and polypectomy at the end of September.      Patient's last menstrual period was 2020 (exact date)..     Patient is sexually active, .  Using tubal ligation for contraception.    reports that she has never smoked. She has never used smokeless tobacco.    STD testing offered?  Declined    Health maintenance updated:  yes    Today's PHQ-2 Score: No flowsheet data found.  Today's PHQ-9 Score: No flowsheet data found.  Today's CULLEN-7 Score: No flowsheet data found.    Problem list and histories reviewed & adjusted, as indicated.  Additional history: as documented.    There is no problem list on file for this patient.    Past Surgical History:   Procedure Laterality Date     CHOLECYSTECTOMY       DILATION AND CURETTAGE, OPERATIVE HYSTEROSCOPY WITH MORCELLATOR, COMBINED N/A 2019    Procedure: hysteroscopy, dilation and curettage, polypectomy with myosure;  Surgeon: Floresita Zimmerman MD;  Location: [facility] OR     DILATION AND CURETTAGE, OPERATIVE HYSTEROSCOPY WITH MORCELLATOR, COMBINED N/A 2020    Procedure: Hysteroscopy, dilation and curettage, endometrial polypectomy;  Surgeon: Floresita Zimmerman MD;  Location: [facility] OR     TUBAL LIGATION  2006    tubal ligation      Social History     Tobacco Use     Smoking status: Never Smoker     Smokeless tobacco: Never Used   Substance Use Topics     Alcohol use: No      Problem (#  of Occurrences) Relation (Name,Age of Onset)    Cerebrovascular Disease (1) Paternal Grandfather    Diabetes (4) Maternal Grandfather, Maternal Grandmother, Paternal Grandmother, Paternal Grandfather    Hyperlipidemia (3) Maternal Grandfather, Maternal Grandmother, Paternal Grandmother    Uterine Cancer (1) Paternal Aunt            Current Outpatient Medications   Medication Sig     LEVOTHYROXINE SODIUM PO Take 50 mcg by mouth daily      Prenatal Vit-Fe Fumarate-FA (PRENATAL MULTIVITAMIN W/IRON) 27-0.8 MG tablet Take 1 tablet by mouth daily     ibuprofen (ADVIL/MOTRIN) 600 MG tablet Take 1 tablet (600 mg) by mouth every 6 hours as needed for other (mild and/or inflammatory pain) (Patient not taking: Reported on 9/8/2020)     No current facility-administered medications for this visit.      No Known Allergies    ROS:  12 point review of systems negative other than symptoms noted below or in the HPI.  No urinary frequency or dysuria, bladder or kidney problems      OBJECTIVE:     /72   Wt 89.8 kg (198 lb)   LMP 08/17/2020 (Exact Date)   Breastfeeding No   BMI 35.07 kg/m    Body mass index is 35.07 kg/m .    Exam:  Constitutional:  Appearance: Well nourished, well developed alert, in no acute distress     In-Clinic Test Results:      ASSESSMENT/PLAN:                                                      40-year-old with previous tubal negation in good proximal segments.  She has 2 failed IVF cycles.  She requests further evaluation and we will draw an FSH and AMH.  With an endometrial polyp present she could also have that done at the same time that we do her reversal.  We will call her with the lab results.          Nick eHrrera MD  [facility] [place]

## 2020-09-08 ENCOUNTER — OFFICE VISIT (OUTPATIENT)
Dept: OBGYN | Facility: CLINIC | Age: 40
End: 2020-09-08
Payer: COMMERCIAL

## 2020-09-08 VITALS — DIASTOLIC BLOOD PRESSURE: 72 MMHG | SYSTOLIC BLOOD PRESSURE: 120 MMHG | BODY MASS INDEX: 35.07 KG/M2 | WEIGHT: 198 LBS

## 2020-09-08 DIAGNOSIS — N97.1 TUBAL OCCLUSION: Primary | ICD-10-CM

## 2020-09-08 PROCEDURE — 99000 SPECIMEN HANDLING OFFICE-LAB: CPT | Performed by: OBSTETRICS & GYNECOLOGY

## 2020-09-08 PROCEDURE — 83001 ASSAY OF GONADOTROPIN (FSH): CPT | Performed by: OBSTETRICS & GYNECOLOGY

## 2020-09-08 PROCEDURE — 99499 UNLISTED E&M SERVICE: CPT | Performed by: OBSTETRICS & GYNECOLOGY

## 2020-09-08 PROCEDURE — 36415 COLL VENOUS BLD VENIPUNCTURE: CPT | Performed by: OBSTETRICS & GYNECOLOGY

## 2020-09-08 PROCEDURE — 83520 IMMUNOASSAY QUANT NOS NONAB: CPT | Mod: 90 | Performed by: OBSTETRICS & GYNECOLOGY

## 2020-09-08 RX ORDER — PRENATAL VIT/IRON FUM/FOLIC AC 27MG-0.8MG
1 TABLET ORAL DAILY
COMMUNITY

## 2020-09-08 ASSESSMENT — ANXIETY QUESTIONNAIRES
2. NOT BEING ABLE TO STOP OR CONTROL WORRYING: NOT AT ALL
3. WORRYING TOO MUCH ABOUT DIFFERENT THINGS: NOT AT ALL
1. FEELING NERVOUS, ANXIOUS, OR ON EDGE: NOT AT ALL
7. FEELING AFRAID AS IF SOMETHING AWFUL MIGHT HAPPEN: NOT AT ALL
GAD7 TOTAL SCORE: 0
6. BECOMING EASILY ANNOYED OR IRRITABLE: NOT AT ALL
IF YOU CHECKED OFF ANY PROBLEMS ON THIS QUESTIONNAIRE, HOW DIFFICULT HAVE THESE PROBLEMS MADE IT FOR YOU TO DO YOUR WORK, TAKE CARE OF THINGS AT HOME, OR GET ALONG WITH OTHER PEOPLE: NOT DIFFICULT AT ALL
5. BEING SO RESTLESS THAT IT IS HARD TO SIT STILL: NOT AT ALL

## 2020-09-08 ASSESSMENT — PATIENT HEALTH QUESTIONNAIRE - PHQ9
SUM OF ALL RESPONSES TO PHQ QUESTIONS 1-9: 0
5. POOR APPETITE OR OVEREATING: NOT AT ALL

## 2020-09-08 NOTE — LETTER
September 18, 2020      Amy Clemens  [address] [place] [address]        Dear ,    We are writing to inform you of your test results.    Your test results fall within the expected range. Please continue with current treatment plan. We can move forward with the next step of possible tubal reversal.    Resulted Orders   Follicle stimulating hormone   Result Value Ref Range    FSH 1.9 IU/L      Comment:      FSH Reference Range  Female: Follicular      2.5-10.2          Mid-cycle       3.4-33.4          Luteal          1.5-9.1          Postmenopausal  23.0-116.3     Anti-Mullerian hormone   Result Value Ref Range    Anti-Mullerian Hormone 3.897 <=6.282 ng/mL      Comment:      (Note)  INTERPRETIVE INFORMATION: Anti-Mullerian Hormone  FEMALE:  6 months - 14 years: 0.256 - 6.345 ng/mL  15-17 years:         0.861 - 10.451 ng/mL  18-29 years:         0.401 - 16.015 ng/mL  30-39 years:         0.176 - 11.705 ng/mL  40-45 years:         6.282 ng/mL or less  46-50 years:         0.064 ng/mL or less  Post-menopausal:     0.003 ng/mL or less  MALE:  6-11 months:         56.677 - 495.299 ng/mL  1-6 years:           33.442 - 342.450 ng/mL  7-9 years:           20.245 - 189.781 ng/mL  10-12 years:         2.903 - 178.243 ng/mL  13 years and older:  2.079 - 30.656 ng/mL  Test developed and characteristics determined by [employer]. See Compliance Statement D: [email or web address]  Performed By: [employer]  [address] [place] [id number]  : Althea Reeves MD         If you have any questions or concerns, please call the clinic at the number listed above.       Sincerely,        Nick Herrera MD

## 2020-09-09 LAB — FSH SERPL-ACNC: 1.9 IU/L

## 2020-09-09 ASSESSMENT — ANXIETY QUESTIONNAIRES: GAD7 TOTAL SCORE: 0

## 2020-09-11 LAB — MIS SERPL-MCNC: 3.9 NG/ML

## 2020-09-23 ENCOUNTER — PREP FOR PROCEDURE (OUTPATIENT)
Dept: OBGYN | Facility: CLINIC | Age: 40
End: 2020-09-23

## 2020-09-23 ENCOUNTER — TELEPHONE (OUTPATIENT)
Dept: OBGYN | Facility: CLINIC | Age: 40
End: 2020-09-23

## 2020-09-23 DIAGNOSIS — N97.1 TUBAL OCCLUSION: Primary | ICD-10-CM

## 2020-09-25 RX ORDER — ACETAMINOPHEN 325 MG/1
325-650 TABLET ORAL EVERY 6 HOURS PRN
Status: ON HOLD | COMMUNITY
End: 2020-09-29

## 2020-09-25 ASSESSMENT — MIFFLIN-ST. JEOR: SCORE: 1486.89

## 2020-09-29 ENCOUNTER — HOSPITAL ENCOUNTER (OUTPATIENT)
Facility: CLINIC | Age: 40
Discharge: HOME | End: 2020-09-29
Attending: OBSTETRICS & GYNECOLOGY | Admitting: OBSTETRICS & GYNECOLOGY
Payer: COMMERCIAL

## 2020-09-29 ENCOUNTER — ANESTHESIA (OUTPATIENT)
Dept: SURGERY | Facility: CLINIC | Age: 40
End: 2020-09-29
Payer: COMMERCIAL

## 2020-09-29 ENCOUNTER — ANESTHESIA EVENT (OUTPATIENT)
Dept: SURGERY | Facility: CLINIC | Age: 40
End: 2020-09-29
Payer: COMMERCIAL

## 2020-09-29 VITALS
HEART RATE: 77 BPM | RESPIRATION RATE: 15 BRPM | SYSTOLIC BLOOD PRESSURE: 117 MMHG | OXYGEN SATURATION: 98 % | TEMPERATURE: 97.3 F | DIASTOLIC BLOOD PRESSURE: 73 MMHG | HEIGHT: 63 IN | BODY MASS INDEX: 33.13 KG/M2 | WEIGHT: 187 LBS

## 2020-09-29 DIAGNOSIS — Z98.890 STATUS POST HYSTEROSCOPY: Primary | ICD-10-CM

## 2020-09-29 LAB — HCG UR QL: NEGATIVE

## 2020-09-29 PROCEDURE — 71000012 ZZH RECOVERY PHASE 1 LEVEL 1 FIRST HR: Performed by: OBSTETRICS & GYNECOLOGY

## 2020-09-29 PROCEDURE — 71000013 ZZH RECOVERY PHASE 1 LEVEL 1 EA ADDTL HR: Performed by: OBSTETRICS & GYNECOLOGY

## 2020-09-29 PROCEDURE — 25800025 ZZH RX 258: Performed by: OBSTETRICS & GYNECOLOGY

## 2020-09-29 PROCEDURE — 71000027 ZZH RECOVERY PHASE 2 EACH 15 MINS: Performed by: OBSTETRICS & GYNECOLOGY

## 2020-09-29 PROCEDURE — 25000128 H RX IP 250 OP 636: Performed by: NURSE ANESTHETIST, CERTIFIED REGISTERED

## 2020-09-29 PROCEDURE — 25000128 H RX IP 250 OP 636: Performed by: ANESTHESIOLOGY

## 2020-09-29 PROCEDURE — 25800030 ZZH RX IP 258 OP 636: Performed by: ANESTHESIOLOGY

## 2020-09-29 PROCEDURE — 36000056 ZZH SURGERY LEVEL 3 1ST 30 MIN: Performed by: OBSTETRICS & GYNECOLOGY

## 2020-09-29 PROCEDURE — 25000125 ZZHC RX 250: Performed by: NURSE ANESTHETIST, CERTIFIED REGISTERED

## 2020-09-29 PROCEDURE — 36000058 ZZH SURGERY LEVEL 3 EA 15 ADDTL MIN: Performed by: OBSTETRICS & GYNECOLOGY

## 2020-09-29 PROCEDURE — 25000128 H RX IP 250 OP 636: Performed by: OBSTETRICS & GYNECOLOGY

## 2020-09-29 PROCEDURE — 40000306 ZZH STATISTIC PRE PROC ASSESS II: Performed by: OBSTETRICS & GYNECOLOGY

## 2020-09-29 PROCEDURE — 37000009 ZZH ANESTHESIA TECHNICAL FEE, EACH ADDTL 15 MIN: Performed by: OBSTETRICS & GYNECOLOGY

## 2020-09-29 PROCEDURE — 88305 TISSUE EXAM BY PATHOLOGIST: CPT | Mod: 26 | Performed by: OBSTETRICS & GYNECOLOGY

## 2020-09-29 PROCEDURE — 37000008 ZZH ANESTHESIA TECHNICAL FEE, 1ST 30 MIN: Performed by: OBSTETRICS & GYNECOLOGY

## 2020-09-29 PROCEDURE — 27210794 ZZH OR GENERAL SUPPLY STERILE: Performed by: OBSTETRICS & GYNECOLOGY

## 2020-09-29 PROCEDURE — 88305 TISSUE EXAM BY PATHOLOGIST: CPT | Performed by: OBSTETRICS & GYNECOLOGY

## 2020-09-29 PROCEDURE — 81025 URINE PREGNANCY TEST: CPT | Performed by: OBSTETRICS & GYNECOLOGY

## 2020-09-29 PROCEDURE — 25000132 ZZH RX MED GY IP 250 OP 250 PS 637: Performed by: OBSTETRICS & GYNECOLOGY

## 2020-09-29 RX ORDER — ACETAMINOPHEN AND CODEINE PHOSPHATE 120; 12 MG/5ML; MG/5ML
0.35 SOLUTION ORAL DAILY
Qty: 90 TABLET | Refills: 1 | Status: SHIPPED | OUTPATIENT
Start: 2020-09-29 | End: 2021-03-11

## 2020-09-29 RX ORDER — IBUPROFEN 600 MG/1
600 TABLET, FILM COATED ORAL EVERY 6 HOURS PRN
Qty: 30 TABLET | Refills: 0 | Status: SHIPPED | OUTPATIENT
Start: 2020-09-29 | End: 2021-03-11

## 2020-09-29 RX ORDER — OXYCODONE HYDROCHLORIDE 5 MG/1
5 TABLET ORAL
Status: DISCONTINUED | OUTPATIENT
Start: 2020-09-29 | End: 2020-09-29 | Stop reason: HOSPADM

## 2020-09-29 RX ORDER — ACETAMINOPHEN 325 MG/1
975 TABLET ORAL EVERY 6 HOURS PRN
Qty: 60 TABLET | Refills: 1 | Status: SHIPPED | OUTPATIENT
Start: 2020-09-29 | End: 2021-03-11

## 2020-09-29 RX ORDER — MEPERIDINE HYDROCHLORIDE 25 MG/ML
12.5 INJECTION INTRAMUSCULAR; INTRAVENOUS; SUBCUTANEOUS
Status: DISCONTINUED | OUTPATIENT
Start: 2020-09-29 | End: 2020-09-29 | Stop reason: HOSPADM

## 2020-09-29 RX ORDER — ONDANSETRON 4 MG/1
4 TABLET, ORALLY DISINTEGRATING ORAL
Status: DISCONTINUED | OUTPATIENT
Start: 2020-09-29 | End: 2020-09-29 | Stop reason: HOSPADM

## 2020-09-29 RX ORDER — KETOROLAC TROMETHAMINE 30 MG/ML
30 INJECTION, SOLUTION INTRAMUSCULAR; INTRAVENOUS ONCE
Status: COMPLETED | OUTPATIENT
Start: 2020-09-29 | End: 2020-09-29

## 2020-09-29 RX ORDER — PROPOFOL 10 MG/ML
INJECTION, EMULSION INTRAVENOUS CONTINUOUS PRN
Status: DISCONTINUED | OUTPATIENT
Start: 2020-09-29 | End: 2020-09-29

## 2020-09-29 RX ORDER — HYDROXYZINE HYDROCHLORIDE 25 MG/1
25 TABLET, FILM COATED ORAL
Status: DISCONTINUED | OUTPATIENT
Start: 2020-09-29 | End: 2020-09-29 | Stop reason: HOSPADM

## 2020-09-29 RX ORDER — LIDOCAINE 40 MG/G
CREAM TOPICAL
Status: DISCONTINUED | OUTPATIENT
Start: 2020-09-29 | End: 2020-09-29 | Stop reason: HOSPADM

## 2020-09-29 RX ORDER — SODIUM CHLORIDE, SODIUM LACTATE, POTASSIUM CHLORIDE, CALCIUM CHLORIDE 600; 310; 30; 20 MG/100ML; MG/100ML; MG/100ML; MG/100ML
INJECTION, SOLUTION INTRAVENOUS CONTINUOUS
Status: DISCONTINUED | OUTPATIENT
Start: 2020-09-29 | End: 2020-09-29 | Stop reason: HOSPADM

## 2020-09-29 RX ORDER — DEXAMETHASONE SODIUM PHOSPHATE 4 MG/ML
INJECTION, SOLUTION INTRA-ARTICULAR; INTRALESIONAL; INTRAMUSCULAR; INTRAVENOUS; SOFT TISSUE PRN
Status: DISCONTINUED | OUTPATIENT
Start: 2020-09-29 | End: 2020-09-29

## 2020-09-29 RX ORDER — AMOXICILLIN 250 MG
1-2 CAPSULE ORAL 2 TIMES DAILY PRN
Qty: 10 TABLET | Refills: 1 | Status: SHIPPED | OUTPATIENT
Start: 2020-09-29 | End: 2021-03-11

## 2020-09-29 RX ORDER — PROPOFOL 10 MG/ML
INJECTION, EMULSION INTRAVENOUS PRN
Status: DISCONTINUED | OUTPATIENT
Start: 2020-09-29 | End: 2020-09-29

## 2020-09-29 RX ORDER — ONDANSETRON 2 MG/ML
4 INJECTION INTRAMUSCULAR; INTRAVENOUS EVERY 30 MIN PRN
Status: DISCONTINUED | OUTPATIENT
Start: 2020-09-29 | End: 2020-09-29 | Stop reason: HOSPADM

## 2020-09-29 RX ORDER — ONDANSETRON 2 MG/ML
INJECTION INTRAMUSCULAR; INTRAVENOUS PRN
Status: DISCONTINUED | OUTPATIENT
Start: 2020-09-29 | End: 2020-09-29

## 2020-09-29 RX ORDER — ONDANSETRON 4 MG/1
4-8 TABLET, ORALLY DISINTEGRATING ORAL EVERY 8 HOURS PRN
Qty: 4 TABLET | Refills: 0 | Status: SHIPPED | OUTPATIENT
Start: 2020-09-29 | End: 2021-03-11

## 2020-09-29 RX ORDER — FENTANYL CITRATE 50 UG/ML
25-50 INJECTION, SOLUTION INTRAMUSCULAR; INTRAVENOUS
Status: DISCONTINUED | OUTPATIENT
Start: 2020-09-29 | End: 2020-09-29 | Stop reason: HOSPADM

## 2020-09-29 RX ORDER — ONDANSETRON 4 MG/1
4 TABLET, ORALLY DISINTEGRATING ORAL EVERY 30 MIN PRN
Status: DISCONTINUED | OUTPATIENT
Start: 2020-09-29 | End: 2020-09-29 | Stop reason: HOSPADM

## 2020-09-29 RX ORDER — NALOXONE HYDROCHLORIDE 0.4 MG/ML
.1-.4 INJECTION, SOLUTION INTRAMUSCULAR; INTRAVENOUS; SUBCUTANEOUS
Status: DISCONTINUED | OUTPATIENT
Start: 2020-09-29 | End: 2020-09-29 | Stop reason: HOSPADM

## 2020-09-29 RX ORDER — LABETALOL 20 MG/4 ML (5 MG/ML) INTRAVENOUS SYRINGE
10 EVERY 5 MIN PRN
Status: DISCONTINUED | OUTPATIENT
Start: 2020-09-29 | End: 2020-09-29 | Stop reason: HOSPADM

## 2020-09-29 RX ORDER — ACETAMINOPHEN 325 MG/1
975 TABLET ORAL ONCE
Status: COMPLETED | OUTPATIENT
Start: 2020-09-29 | End: 2020-09-29

## 2020-09-29 RX ORDER — ALBUTEROL SULFATE 0.83 MG/ML
2.5 SOLUTION RESPIRATORY (INHALATION) EVERY 4 HOURS PRN
Status: DISCONTINUED | OUTPATIENT
Start: 2020-09-29 | End: 2020-09-29 | Stop reason: HOSPADM

## 2020-09-29 RX ORDER — FENTANYL CITRATE 50 UG/ML
INJECTION, SOLUTION INTRAMUSCULAR; INTRAVENOUS PRN
Status: DISCONTINUED | OUTPATIENT
Start: 2020-09-29 | End: 2020-09-29

## 2020-09-29 RX ORDER — LIDOCAINE HYDROCHLORIDE 10 MG/ML
INJECTION, SOLUTION INFILTRATION; PERINEURAL PRN
Status: DISCONTINUED | OUTPATIENT
Start: 2020-09-29 | End: 2020-09-29

## 2020-09-29 RX ORDER — HYDROMORPHONE HYDROCHLORIDE 1 MG/ML
.3-.5 INJECTION, SOLUTION INTRAMUSCULAR; INTRAVENOUS; SUBCUTANEOUS EVERY 10 MIN PRN
Status: DISCONTINUED | OUTPATIENT
Start: 2020-09-29 | End: 2020-09-29 | Stop reason: HOSPADM

## 2020-09-29 RX ADMIN — FENTANYL CITRATE 50 MCG: 50 INJECTION, SOLUTION INTRAMUSCULAR; INTRAVENOUS at 08:30

## 2020-09-29 RX ADMIN — PROPOFOL 100 MCG/KG/MIN: 10 INJECTION, EMULSION INTRAVENOUS at 07:47

## 2020-09-29 RX ADMIN — KETOROLAC TROMETHAMINE 30 MG: 30 INJECTION, SOLUTION INTRAMUSCULAR at 07:23

## 2020-09-29 RX ADMIN — MIDAZOLAM 2 MG: 1 INJECTION INTRAMUSCULAR; INTRAVENOUS at 07:38

## 2020-09-29 RX ADMIN — ACETAMINOPHEN 975 MG: 325 TABLET, FILM COATED ORAL at 07:23

## 2020-09-29 RX ADMIN — SODIUM CHLORIDE, POTASSIUM CHLORIDE, SODIUM LACTATE AND CALCIUM CHLORIDE: 600; 310; 30; 20 INJECTION, SOLUTION INTRAVENOUS at 09:51

## 2020-09-29 RX ADMIN — ONDANSETRON HYDROCHLORIDE 4 MG: 2 INJECTION, SOLUTION INTRAVENOUS at 07:58

## 2020-09-29 RX ADMIN — LIDOCAINE HYDROCHLORIDE 50 MG: 10 INJECTION, SOLUTION INFILTRATION; PERINEURAL at 07:43

## 2020-09-29 RX ADMIN — SODIUM CHLORIDE, POTASSIUM CHLORIDE, SODIUM LACTATE AND CALCIUM CHLORIDE: 600; 310; 30; 20 INJECTION, SOLUTION INTRAVENOUS at 07:38

## 2020-09-29 RX ADMIN — FENTANYL CITRATE 50 MCG: 50 INJECTION, SOLUTION INTRAMUSCULAR; INTRAVENOUS at 07:58

## 2020-09-29 RX ADMIN — FENTANYL CITRATE 50 MCG: 50 INJECTION, SOLUTION INTRAMUSCULAR; INTRAVENOUS at 07:43

## 2020-09-29 RX ADMIN — DEXAMETHASONE SODIUM PHOSPHATE 4 MG: 4 INJECTION, SOLUTION INTRA-ARTICULAR; INTRALESIONAL; INTRAMUSCULAR; INTRAVENOUS; SOFT TISSUE at 07:44

## 2020-09-29 RX ADMIN — PROPOFOL 200 MG: 10 INJECTION, EMULSION INTRAVENOUS at 07:43

## 2020-09-29 SDOH — HEALTH STABILITY: MENTAL HEALTH: CURRENT SMOKER: 0

## 2020-09-29 ASSESSMENT — MIFFLIN-ST. JEOR
SCORE: 1482.36
SCORE: 1486.89

## 2020-09-29 NOTE — OP NOTE
Operative Note    Patient: Amy Clemens  : 1980  MRN: [id number]    Date of Service: 2020    Pre-operative diagnosis:  - AUB  - Thickened EMS  - H/o endometrial polyp(s)    Post-operative diagnosis:  - Same, s/p below stated procedure(s)    Procedure:   - Exam under anesthesia   - Hysteroscopic polypectomy  - Dilation and curettage     Surgeon: Arabella Felix MD    Anesthesia:  LMA    EBL: 5 mL  Urine: 100 mL clear  IV Fluids: 700 cystalloid  Fluid Medium: Normal Saline   Fluid Deficit: 150 mL     Specimens:   - Endometrial curettings    Complications: None    Findings: EUA revealed normal cervix and mid position uterus uterus, no adnexal masses.  Uterine cavity with proliferation posteriorly with small polyp located on the left lateral posterior wall of endometrium.  Normal ostia bilaterally.     Indications: Amy Clemens is a 40 year old  female who presented for evaluation on 2020 for pelvic pain, and AUB following medical management of miscarriage (following IVF with [facility] in 3/2020).  At that time, pelvic US was completed with thickened EMS measuring up to 1.6 cm with questionable polyp on the posterior endometrium.  Given her history of endometrial polyps s/p removal and desires for pregnancy despite failed IVF (patient is recently s/p consultation for tubal reanastomosis with Dr. Herrera and contemplating moving forward in 2020), she desired to proceed with surgical management.  Risks, benefits, and alternatives to the procedure were discussed. The patient's questions were answered, understanding confirmed, and the patient signed written informed consent.    Technique: The patient was taken to the operating room where she was placed in the dorsal lithotomy position with feet in yellow fin stirrups. The patient was placed under LMA anesthesia. An exam under anesthesia was perfromed. The patient was prepped and draped in the usual sterile fashion. A speculum was placed in the  vagina and the cervix visualized. An Allis clamp was placed on the anterior lip of the cervix at 12 o'clock.  The cervical os was then carefully dilated to 18 Portuguese with sequential Flores dilators. The operating hysteroscope was placed through the cervix into the uterine cavity.  Examination of the uterine cavity demonstrated the above findings. The remainder of the cavity was unremarkable. Pictures were taken. The hysteroscopic morcellator was used to remove the polyp(s) mentioned above with good success. Pictures were taken. The hysteroscope apparatus was removed and total of 150 mL fluid deficit of normal saline noted. A sharp curet was used to scrape the uterus which was gritty on all aspects and with minimal return of tissue. The curetings were sent to pathology. The Allis clamp was removed from the cervix and good hemostasis was noted. The speculum was removed from the vagina.    Instrument counts were correct x2. The patient was awoken in the OR and transferred to the PACU in stable condition.    Arabella Gibson MD   Pager: [phone]   September 29, 2020

## 2020-09-29 NOTE — ANESTHESIA CARE TRANSFER NOTE
Patient: Amy Clemens    Procedure(s):  diagnostic HYSTEROSCOPY, POLYPECTOMY WITH MYOSURE MORCELLATOR, sharp DILATION AND CURETTAGE    Diagnosis: Endometrial polyp [N84.0]  Diagnosis Additional Information: No value filed.    Anesthesia Type:   General     Note:  Airway :Face Mask and Other (See Comments)  Patient transferred to:PACU  Comments: Pt to PACU without issue. VSS. RRR. SBB.  FiO2 via FM with NPA at 8LPM.  Pt resting comfortably.  Report to RN at bedside. Handoff Report: Identifed the Patient, Identified the Reponsible Provider, Reviewed the pertinent medical history, Discussed the surgical course, Reviewed Intra-OP anesthesia mangement and issues during anesthesia, Set expectations for post-procedure period and Allowed opportunity for questions and acknowledgement of understanding      Vitals: (Last set prior to Anesthesia Care Transfer)    CRNA VITALS  9/29/2020 0744 - 9/29/2020 0823      9/29/2020             Pulse:  78    SpO2:  97 %                Electronically Signed By: KYRIE Lazaro CRNA  September 29, 2020  8:23 AM

## 2020-09-29 NOTE — DISCHARGE INSTRUCTIONS
GENERAL ANESTHESIA OR SEDATION ADULT DISCHARGE INSTRUCTIONS   SPECIAL PRECAUTIONS FOR 24 HOURS AFTER SURGERY    IT IS NOT UNUSUAL TO FEEL LIGHT-HEADED OR FAINT, UP TO 24 HOURS AFTER SURGERY OR WHILE TAKING PAIN MEDICATION.  IF YOU HAVE THESE SYMPTOMS; SIT FOR A FEW MINUTES BEFORE STANDING AND HAVE SOMEONE ASSIST YOU WHEN YOU GET UP TO WALK OR USE THE BATHROOM.    YOU SHOULD REST AND RELAX FOR THE NEXT 24 HOURS AND YOU MUST MAKE ARRANGEMENTS TO HAVE SOMEONE STAY WITH YOU FOR AT LEAST 24 HOURS AFTER YOUR DISCHARGE.  AVOID HAZARDOUS AND STRENUOUS ACTIVITIES.  DO NOT MAKE IMPORTANT DECISIONS FOR 24 HOURS.    DO NOT DRIVE ANY VEHICLE OR OPERATE MECHANICAL EQUIPMENT FOR 24 HOURS FOLLOWING THE END OF YOUR SURGERY.  EVEN THOUGH YOU MAY FEEL NORMAL, YOUR REACTIONS MAY BE AFFECTED BY THE MEDICATION YOU HAVE RECEIVED.    DO NOT DRINK ALCOHOLIC BEVERAGES FOR 24 HOURS FOLLOWING YOUR SURGERY.    DRINK CLEAR LIQUIDS (APPLE JUICE, GINGER ALE, 7-UP, BROTH, ETC.).  PROGRESS TO YOUR REGULAR DIET AS YOU FEEL ABLE.    YOU MAY HAVE A DRY MOUTH, A SORE THROAT, MUSCLES ACHES OR TROUBLE SLEEPING.  THESE SHOULD GO AWAY AFTER 24 HOURS.    CALL YOUR DOCTOR FOR ANY OF THE FOLLOWING:  SIGNS OF INFECTION (FEVER, GROWING TENDERNESS AT THE SURGERY SITE, A LARGE AMOUNT OF DRAINAGE OR BLEEDING, SEVERE PAIN, FOUL-SMELLING DRAINAGE, REDNESS OR SWELLING.    IT HAS BEEN OVER 8 TO 10 HOURS SINCE SURGERY AND YOU ARE STILL NOT ABLE TO URINATE (PASS WATER).     DR. BRADLEY CHOI M.D.                  CLINIC PHONE NUMBER:  [phone]  [facility]

## 2020-09-29 NOTE — ANESTHESIA PREPROCEDURE EVALUATION
Anesthesia Pre-Procedure Evaluation    Patient: Amy Clemens   MRN: [id number] : 1980          Preoperative Diagnosis: Endometrial polyp [N84.0]    Procedure(s):  diagnostic HYSTEROSCOPY, POLYPECTOMY WITH MYOSURE MORCELLATOR, sharp DILATION AND CURETTAGE    Past Medical History:   Diagnosis Date     Endometriosis      Gastroesophageal reflux disease      Thyroid disease      Tubal occlusion      Past Surgical History:   Procedure Laterality Date     CHOLECYSTECTOMY       DILATION AND CURETTAGE, OPERATIVE HYSTEROSCOPY WITH MORCELLATOR, COMBINED N/A 2019    Procedure: hysteroscopy, dilation and curettage, polypectomy with myosure;  Surgeon: Floresita Zimmerman MD;  Location: [facility]     DILATION AND CURETTAGE, OPERATIVE HYSTEROSCOPY WITH MORCELLATOR, COMBINED N/A 2020    Procedure: Hysteroscopy, dilation and curettage, endometrial polypectomy;  Surgeon: Floresita Zimmerman MD;  Location: [facility] OR     TUBAL LIGATION  2006    tubal ligation     Anesthesia Evaluation     . Pt has had prior anesthetic. Type: General    No history of anesthetic complications          ROS/MED HX    ENT/Pulmonary:  - neg pulmonary ROS     Neurologic:  - neg neurologic ROS     Cardiovascular:  - neg cardiovascular ROS       METS/Exercise Tolerance:     Hematologic:  - neg hematologic  ROS       Musculoskeletal:  - neg musculoskeletal ROS       GI/Hepatic:     (+) GERD Asymptomatic on medication,       Renal/Genitourinary:     (+) Other Renal/ Genitourinary, uterine polyps      Endo:     (+) thyroid problem hypothyroidism, Obesity, .      Psychiatric:  - neg psychiatric ROS       Infectious Disease:  - neg infectious disease ROS       Malignancy:      - no malignancy   Other:    - neg other ROS                      Physical Exam  Normal systems: cardiovascular, pulmonary and dental    Airway   Mallampati: I  TM distance: >3 FB  Neck ROM: full    Dental     Cardiovascular       Pulmonary             Lab Results   Component Value Date  "   WBC 8.3 04/10/2020    HGB 10.7 (L) 04/10/2020    HCT 33.2 (L) 04/10/2020     04/10/2020    SED 7 10/15/2016     04/10/2020    POTASSIUM 3.6 04/10/2020    CHLORIDE 108 04/10/2020    CO2 24 04/10/2020    BUN 11 04/10/2020    CR 0.48 (L) 04/10/2020     (H) 04/10/2020    DAVID 8.5 04/10/2020    ALBUMIN 3.6 03/11/2018    PROTTOTAL 7.0 03/11/2018    ALT 27 03/11/2018    AST 14 03/11/2018    ALKPHOS 80 03/11/2018    BILITOTAL 0.2 03/11/2018    LIPASE 164 03/11/2018    INR 1.00 04/10/2020    TSH 1.20 04/10/2020    HCG Negative 02/07/2019       Preop Vitals  BP Readings from Last 3 Encounters:   09/29/20 132/86   09/08/20 120/72   04/10/20 125/64    Pulse Readings from Last 3 Encounters:   09/29/20 86   04/10/20 84   01/13/20 67      Resp Readings from Last 3 Encounters:   09/29/20 18   04/10/20 16   01/13/20 16    SpO2 Readings from Last 3 Encounters:   09/29/20 98%   04/10/20 100%   01/13/20 98%      Temp Readings from Last 1 Encounters:   09/29/20 97.7  F (36.5  C) (Temporal)    Ht Readings from Last 1 Encounters:   09/29/20 1.6 m (5' 3\")      Wt Readings from Last 1 Encounters:   09/29/20 84.8 kg (187 lb)    Estimated body mass index is 33.13 kg/m  as calculated from the following:    Height as of this encounter: 1.6 m (5' 3\").    Weight as of this encounter: 84.8 kg (187 lb).       Anesthesia Plan      History & Physical Review  History and physical reviewed and following examination; no interval change.    ASA Status:  2 .    NPO Status:  > 8 hours    Plan for General with Intravenous and Propofol induction. Maintenance will be Balanced.        The patient is not a current smoker , Patient not instructed to abstain from smoking on day of procedure and patient did not smoke on day of surgery     Postoperative Care  Postoperative pain management:  IV analgesics and Oral pain medications.      Consents  Anesthetic plan, risks, benefits and alternatives discussed with:  Patient or representative and " Patient..                 Charly Gustafson MD                    .

## 2020-09-29 NOTE — ANESTHESIA POSTPROCEDURE EVALUATION
Patient: Amy Clemens    Procedure(s):  diagnostic HYSTEROSCOPY, POLYPECTOMY WITH MYOSURE MORCELLATOR, sharp DILATION AND CURETTAGE    Diagnosis:Endometrial polyp [N84.0]  Diagnosis Additional Information: No value filed.    Anesthesia Type:  General    Note:  Anesthesia Post Evaluation    Patient location during evaluation: Phase 2  Patient participation: Able to fully participate in evaluation  Level of consciousness: awake and alert  Pain management: adequate  Airway patency: patent  Cardiovascular status: acceptable  Respiratory status: acceptable  Hydration status: acceptable  PONV: none     Anesthetic complications: None          Last vitals:  Vitals:    09/29/20 0915 09/29/20 0930 09/29/20 0945   BP: 108/66 111/64 111/68   Pulse: 69 64 72   Resp: 18 17 16   Temp:   97.8  F (36.6  C)   SpO2: 95% 94% 95%         Electronically Signed By: Charly Gustafson MD  September 29, 2020  9:54 AM

## 2020-09-29 NOTE — PROGRESS NOTES
Discharge instructions reviewed via telephone with patient's daughter Juan Carlos.  Patient and daughter verbalize understanding of instructions.

## 2020-09-30 LAB — COPATH REPORT: NORMAL

## 2020-11-10 PROBLEM — N97.1 TUBAL OCCLUSION: Status: ACTIVE | Noted: 2020-11-10

## 2020-11-10 NOTE — TELEPHONE ENCOUNTER
Type of surgery: TUBAL REVERSAL  Location of surgery: [facility]  Date and time of surgery: 1/26/2021 7:20a  Surgeon: MATILDE  Pre-Op Appt Date: 1/19/2021  Post-Op Appt Date: TBD   Packet sent out: MAILED 11/10/2020  Pre-cert/Authorization completed:  NA CASH PAY  Date: 11/10/2020 Miguel Angel sanchez/vIonne Caban  Surgery Scheduler    CASH PAY $$ DUE BY 1/12/2021

## 2020-11-18 DIAGNOSIS — Z11.59 ENCOUNTER FOR SCREENING FOR OTHER VIRAL DISEASES: Primary | ICD-10-CM

## 2021-01-04 NOTE — TELEPHONE ENCOUNTER
Per pt request this procedure date has changed as follows    SX 2/25/2021 7:20  PREOP 2/18/2021 1:45p  COVID TEST TO BE SCHEDULED 2/22/2021 as soon as schedules open    LVM for pt requesting CB to confirm.    $6660 due no later than 2/11/2021    Kailyn Caban  Surgery Scheduler

## 2021-01-28 NOTE — TELEPHONE ENCOUNTER
Per pt request and Dr Herrera recommendation due to a hernia repair surgery on 2/25 this has been moved as follows.  Spoke with Kailyn at [facility] for changes    SX 3/18/2021 7:20a  PREOP 3/11/2021 1:45pm  COVID TEST 3/15/2021 9:30am [place]    ALL MONEY DUE BY 3/4/2021 $6660    Pt notified via email as that is how she started the conversation and is currently out of town.  Requested confirmation email     Kailyn Caban  Surgery Scheduler

## 2021-02-12 DIAGNOSIS — Z11.59 ENCOUNTER FOR SCREENING FOR OTHER VIRAL DISEASES: Primary | ICD-10-CM

## 2021-03-04 NOTE — TELEPHONE ENCOUNTER
Pt having issues with her card.  Was able to run $3330 today. She is calling her bank to find out the issue    Kailyn Richardson  Surgery Scheduler    BALANCE $3330

## 2021-03-09 NOTE — PROGRESS NOTES
SUBJECTIVE:                                                   Amy Clemens is a 41 year old female who presents to clinic today for the following health issue(s):  Patient presents with:  Pre-Op Exam: 3/18/21: Laparoscopy, possible laparotomy for tubal reanastomosis      HPI: The patient is seen at this time for preoperative clearance for a laparoscopy and possible laparotomy for reversal.  We have reviewed her operative notes and have done a hysterosalpingogram that showed adequate proximal segments.  She does have a right inguinal hernia and was scheduled to have hernia repair but this was rescheduled by her general surgeon.  We will look at this if we end up with an exploration.      Patient's last menstrual period was 2021..     Patient is sexually active, .  Using tubal ligation for contraception.    reports that she has never smoked. She has never used smokeless tobacco.        Health maintenance updated:  yes    Today's PHQ-2 Score:   PHQ-2 (  [employer]) 3/11/2021   Q1: Little interest or pleasure in doing things 0   Q2: Feeling down, depressed or hopeless 0   PHQ-2 Score 0     Today's PHQ-9 Score:   PHQ-9 SCORE 2020   PHQ-9 Total Score 0     Today's CULLEN-7 Score:   CULLEN-7 SCORE 2020   Total Score 0       Problem list and histories reviewed & adjusted, as indicated.  Additional history: as documented.    Patient Active Problem List   Diagnosis     Tubal occlusion     Past Surgical History:   Procedure Laterality Date     CHOLECYSTECTOMY       DILATION AND CURETTAGE, OPERATIVE HYSTEROSCOPY WITH MORCELLATOR, COMBINED N/A 2019    Procedure: hysteroscopy, dilation and curettage, polypectomy with myosure;  Surgeon: Floresita Zimmerman MD;  Location: [facility] OR     DILATION AND CURETTAGE, OPERATIVE HYSTEROSCOPY WITH MORCELLATOR, COMBINED N/A 2020    Procedure: Hysteroscopy, dilation and curettage, endometrial polypectomy;  Surgeon: Floresita Zimmerman MD;  Location: [facility]     DILATION AND  "CURETTAGE, OPERATIVE HYSTEROSCOPY WITH MORCELLATOR, COMBINED N/A 9/29/2020    Procedure: diagnostic HYSTEROSCOPY, POLYPECTOMY WITH MYOSURE MORCELLATOR, sharp DILATION AND CURETTAGE;  Surgeon: Arabella Felix MD;  Location: [facility]     TUBAL LIGATION  2006    tubal ligation      Social History     Tobacco Use     Smoking status: Never Smoker     Smokeless tobacco: Never Used   Substance Use Topics     Alcohol use: No      Problem (# of Occurrences) Relation (Name,Age of Onset)    Cerebrovascular Disease (1) Paternal Grandfather    Diabetes (4) Maternal Grandfather, Maternal Grandmother, Paternal Grandmother, Paternal Grandfather    Hyperlipidemia (3) Maternal Grandfather, Maternal Grandmother, Paternal Grandmother    Uterine Cancer (1) Paternal Aunt            Current Outpatient Medications   Medication Sig     LEVOTHYROXINE SODIUM PO Take 50 mcg by mouth daily      Prenatal Vit-Fe Fumarate-FA (PRENATAL MULTIVITAMIN W/IRON) 27-0.8 MG tablet Take 1 tablet by mouth daily     Vitamin D3 (CHOLECALCIFEROL) 125 MCG (5000 UT) tablet Take 1 tablet by mouth daily     No current facility-administered medications for this visit.      No Known Allergies    ROS:  12 point review of systems negative other than symptoms noted below or in the HPI.  No urinary frequency or dysuria, bladder or kidney problems      OBJECTIVE:     /70   Pulse 76   Ht 1.588 m (5' 2.5\")   Wt 89.8 kg (198 lb)   LMP 03/01/2021   BMI 35.64 kg/m    Body mass index is 35.64 kg/m .    Exam:  Constitutional:  Appearance: Well nourished, well developed alert, in no acute distress  Neck:  Lymph Nodes:  No lymphadenopathy present; Thyroid:  Gland size normal, nontender, no nodules or masses present on palpation  Chest:  Respiratory Effort:  Breathing unlabored. Clear to auscultation bilaterally.   Cardiovascular: Heart: Auscultation:  Regular rate, normal rhythm, no murmurs present  Gastrointestinal:  Abdominal Examination:  Abdomen nontender to " palpation, tone normal without rigidity or guarding, no masses present, umbilicus without lesions; Liver/Spleen:  No hepatomegaly present, liver nontender to palpation; Hernias:  No hernias present  Lymphatic: Lymph Nodes:  No other lymphadenopathy present  Skin: General Inspection:  No rashes present, no lesions present, no areas of discoloration.  Neurologic:  Mental Status:  Oriented X3.  Normal strength and tone, sensory exam grossly normal, mentation intact and speech normal.    Psychiatric:  Mentation appears normal and affect normal/bright.  No Pelvic Exam performed     In-Clinic Test Results:      ASSESSMENT/PLAN:                                                        41-year-old patient who is cleared for general anesthesia.  We will perform a laparoscopy and possible laparotomy for reversal.  At the time of the surgery we will look at her right inguinal canal and assess her herniation.  The risk and complications of the procedure have been reviewed and accepted.  The patient understands the success and failure rate and a higher rate of ectopic pregnancy.          Nick Herrera MD  [facility] [place]

## 2021-03-11 ENCOUNTER — OFFICE VISIT (OUTPATIENT)
Dept: OBGYN | Facility: CLINIC | Age: 41
End: 2021-03-11

## 2021-03-11 VITALS
SYSTOLIC BLOOD PRESSURE: 118 MMHG | DIASTOLIC BLOOD PRESSURE: 70 MMHG | HEIGHT: 63 IN | WEIGHT: 198 LBS | BODY MASS INDEX: 35.08 KG/M2 | HEART RATE: 76 BPM

## 2021-03-11 DIAGNOSIS — N97.1 TUBAL OCCLUSION: Primary | ICD-10-CM

## 2021-03-11 PROCEDURE — 99499 UNLISTED E&M SERVICE: CPT | Performed by: OBSTETRICS & GYNECOLOGY

## 2021-03-11 ASSESSMENT — MIFFLIN-ST. JEOR: SCORE: 1519.31

## 2021-03-15 ENCOUNTER — APPOINTMENT (OUTPATIENT)
Dept: CT IMAGING | Facility: CLINIC | Age: 41
End: 2021-03-15
Attending: EMERGENCY MEDICINE
Payer: COMMERCIAL

## 2021-03-15 ENCOUNTER — HOSPITAL ENCOUNTER (EMERGENCY)
Facility: CLINIC | Age: 41
Discharge: HOME | End: 2021-03-15
Attending: EMERGENCY MEDICINE | Admitting: EMERGENCY MEDICINE
Payer: COMMERCIAL

## 2021-03-15 ENCOUNTER — APPOINTMENT (OUTPATIENT)
Dept: ULTRASOUND IMAGING | Facility: CLINIC | Age: 41
End: 2021-03-15
Attending: EMERGENCY MEDICINE
Payer: COMMERCIAL

## 2021-03-15 VITALS
OXYGEN SATURATION: 98 % | DIASTOLIC BLOOD PRESSURE: 87 MMHG | RESPIRATION RATE: 20 BRPM | TEMPERATURE: 97.4 F | HEART RATE: 60 BPM | SYSTOLIC BLOOD PRESSURE: 113 MMHG

## 2021-03-15 DIAGNOSIS — Z11.59 ENCOUNTER FOR SCREENING FOR OTHER VIRAL DISEASES: ICD-10-CM

## 2021-03-15 DIAGNOSIS — R07.9 CHEST PAIN, UNSPECIFIED TYPE: ICD-10-CM

## 2021-03-15 LAB
ALBUMIN SERPL-MCNC: 3.5 G/DL (ref 3.4–5)
ALP SERPL-CCNC: 81 U/L (ref 40–150)
ALT SERPL W P-5'-P-CCNC: 53 U/L (ref 0–50)
ANION GAP SERPL CALCULATED.3IONS-SCNC: 6 MMOL/L (ref 3–14)
AST SERPL W P-5'-P-CCNC: 24 U/L (ref 0–45)
BASOPHILS # BLD AUTO: 0.1 10E9/L (ref 0–0.2)
BASOPHILS NFR BLD AUTO: 0.7 %
BILIRUB SERPL-MCNC: 0.3 MG/DL (ref 0.2–1.3)
BUN SERPL-MCNC: 10 MG/DL (ref 7–30)
CALCIUM SERPL-MCNC: 8.7 MG/DL (ref 8.5–10.1)
CHLORIDE SERPL-SCNC: 105 MMOL/L (ref 94–109)
CO2 SERPL-SCNC: 28 MMOL/L (ref 20–32)
CREAT SERPL-MCNC: 0.58 MG/DL (ref 0.52–1.04)
DIFFERENTIAL METHOD BLD: ABNORMAL
EOSINOPHIL # BLD AUTO: 0.2 10E9/L (ref 0–0.7)
EOSINOPHIL NFR BLD AUTO: 2.8 %
ERYTHROCYTE [DISTWIDTH] IN BLOOD BY AUTOMATED COUNT: 12.3 % (ref 10–15)
GFR SERPL CREATININE-BSD FRML MDRD: >90 ML/MIN/{1.73_M2}
GLUCOSE SERPL-MCNC: 92 MG/DL (ref 70–99)
HCT VFR BLD AUTO: 44.9 % (ref 35–47)
HGB BLD-MCNC: 14.1 G/DL (ref 11.7–15.7)
IMM GRANULOCYTES # BLD: 0 10E9/L (ref 0–0.4)
IMM GRANULOCYTES NFR BLD: 0.4 %
INR PPP: 0.96 (ref 0.86–1.14)
LABORATORY COMMENT REPORT: NORMAL
LYMPHOCYTES # BLD AUTO: 2.1 10E9/L (ref 0.8–5.3)
LYMPHOCYTES NFR BLD AUTO: 28.9 %
MCH RBC QN AUTO: 29.3 PG (ref 26.5–33)
MCHC RBC AUTO-ENTMCNC: 31.4 G/DL (ref 31.5–36.5)
MCV RBC AUTO: 93 FL (ref 78–100)
MONOCYTES # BLD AUTO: 0.6 10E9/L (ref 0–1.3)
MONOCYTES NFR BLD AUTO: 7.7 %
NEUTROPHILS # BLD AUTO: 4.3 10E9/L (ref 1.6–8.3)
NEUTROPHILS NFR BLD AUTO: 59.5 %
NRBC # BLD AUTO: 0 10*3/UL
NRBC BLD AUTO-RTO: 0 /100
PLATELET # BLD AUTO: 292 10E9/L (ref 150–450)
POTASSIUM SERPL-SCNC: 3.7 MMOL/L (ref 3.4–5.3)
PROT SERPL-MCNC: 7 G/DL (ref 6.8–8.8)
RBC # BLD AUTO: 4.81 10E12/L (ref 3.8–5.2)
SARS-COV-2 RNA RESP QL NAA+PROBE: NEGATIVE
SARS-COV-2 RNA RESP QL NAA+PROBE: NORMAL
SODIUM SERPL-SCNC: 139 MMOL/L (ref 133–144)
SPECIMEN SOURCE: NORMAL
SPECIMEN SOURCE: NORMAL
TROPONIN I SERPL-MCNC: <0.015 UG/L (ref 0–0.04)
WBC # BLD AUTO: 7.1 10E9/L (ref 4–11)

## 2021-03-15 PROCEDURE — U0003 INFECTIOUS AGENT DETECTION BY NUCLEIC ACID (DNA OR RNA); SEVERE ACUTE RESPIRATORY SYNDROME CORONAVIRUS 2 (SARS-COV-2) (CORONAVIRUS DISEASE [COVID-19]), AMPLIFIED PROBE TECHNIQUE, MAKING USE OF HIGH THROUGHPUT TECHNOLOGIES AS DESCRIBED BY CMS-2020-01-R: HCPCS | Performed by: OBSTETRICS & GYNECOLOGY

## 2021-03-15 PROCEDURE — 99285 EMERGENCY DEPT VISIT HI MDM: CPT | Mod: 25

## 2021-03-15 PROCEDURE — 93005 ELECTROCARDIOGRAM TRACING: CPT

## 2021-03-15 PROCEDURE — 250N000011 HC RX IP 250 OP 636: Performed by: EMERGENCY MEDICINE

## 2021-03-15 PROCEDURE — 96374 THER/PROPH/DIAG INJ IV PUSH: CPT | Mod: 59

## 2021-03-15 PROCEDURE — 85025 COMPLETE CBC W/AUTO DIFF WBC: CPT | Performed by: EMERGENCY MEDICINE

## 2021-03-15 PROCEDURE — 93971 EXTREMITY STUDY: CPT | Mod: LT

## 2021-03-15 PROCEDURE — 250N000009 HC RX 250: Performed by: EMERGENCY MEDICINE

## 2021-03-15 PROCEDURE — U0005 INFEC AGEN DETEC AMPLI PROBE: HCPCS | Performed by: OBSTETRICS & GYNECOLOGY

## 2021-03-15 PROCEDURE — 80053 COMPREHEN METABOLIC PANEL: CPT | Performed by: EMERGENCY MEDICINE

## 2021-03-15 PROCEDURE — 71275 CT ANGIOGRAPHY CHEST: CPT

## 2021-03-15 PROCEDURE — 84484 ASSAY OF TROPONIN QUANT: CPT | Performed by: EMERGENCY MEDICINE

## 2021-03-15 PROCEDURE — 85610 PROTHROMBIN TIME: CPT | Performed by: EMERGENCY MEDICINE

## 2021-03-15 RX ORDER — IOPAMIDOL 755 MG/ML
500 INJECTION, SOLUTION INTRAVASCULAR ONCE
Status: COMPLETED | OUTPATIENT
Start: 2021-03-15 | End: 2021-03-15

## 2021-03-15 RX ORDER — MORPHINE SULFATE 4 MG/ML
4 INJECTION, SOLUTION INTRAMUSCULAR; INTRAVENOUS
Status: DISCONTINUED | OUTPATIENT
Start: 2021-03-15 | End: 2021-03-15 | Stop reason: HOSPADM

## 2021-03-15 RX ADMIN — SODIUM CHLORIDE 87 ML: 9 INJECTION, SOLUTION INTRAVENOUS at 14:41

## 2021-03-15 RX ADMIN — MORPHINE SULFATE 4 MG: 4 INJECTION INTRAVENOUS at 14:21

## 2021-03-15 RX ADMIN — IOPAMIDOL 67 ML: 755 INJECTION, SOLUTION INTRAVENOUS at 14:40

## 2021-03-15 ASSESSMENT — ENCOUNTER SYMPTOMS
COUGH: 0
RHINORRHEA: 0
BACK PAIN: 1
FEVER: 0

## 2021-03-15 NOTE — DISCHARGE INSTRUCTIONS
Your CT scan did not show any blood clots or pneumonia  No blood clot in your leg  Labs are normal  Continue tylenol  Follow up with your doctor as needed   Schizophrenia HTN (hypertension)

## 2021-03-15 NOTE — ED PROVIDER NOTES
History   Chief Complaint:  Chest Pain and Back Pain       The history is provided by the patient.      Amy Clemens is a 41 year old female who presents with chest pain and back pain. Last week she noticed intermittent chest pain lasting a few seconds in duration which subsided with Tylenol. She states she also had diarrhea last week that subsided with Pepto. Yesterday, front left Leg pain began that has since subsided. Today around 1300 while washing dishes, left back pain began. She states pain radiates towards the front and is intermittent. She has not taken anything for the pain. Denies over exertion, injury, fever, cough, runny nose, or sick contacts. Denies past similar episodes or medical history of heart problems.    Review of Systems   Constitutional: Negative for fever.   HENT: Negative for rhinorrhea.    Respiratory: Negative for cough.    Cardiovascular: Positive for chest pain.   Musculoskeletal: Positive for back pain.   All other systems reviewed and are negative.        Allergies:  No Known Allergies    Medications:  LEVOTHYROXINE SODIUM PO  Prenatal Vit-Fe Fumarate-FA (PRENATAL MULTIVITAMIN W/IRON) 27-0.8 MG tablet  Vitamin D3 (CHOLECALCIFEROL) 125 MCG (5000 UT) tablet    Past Medical History:    Endometriosis   Gastroesophageal reflux disease   Thyroid  Disease      Past Surgical History:    Tubal occulusion   Cholecystectomy   Dilation and curettage x 3   Tubal ligation     Family History:    Father: diabetes mellitus   Mother: cancer     Social History:  Presents alone.   Denies alcohol and drug use   PCP:  Jemima Melgar    Physical Exam     Patient Vitals for the past 24 hrs:   BP Temp Temp src Pulse Resp SpO2   03/15/21 1500 -- -- -- 60 -- 98 %   03/15/21 1415 113/87 -- -- -- -- --   03/15/21 1347 131/82 97.4  F (36.3  C) Temporal 74 20 100 %       Physical Exam  Constitutional:       Appearance: She is well-developed.   HENT:      Right Ear: External ear normal.      Left Ear: External  ear normal.      Mouth/Throat:      Mouth: Mucous membranes are moist.      Pharynx: Oropharynx is clear. No oropharyngeal exudate.   Eyes:      General: No scleral icterus.     Extraocular Movements: Extraocular movements intact.      Conjunctiva/sclera: Conjunctivae normal.      Pupils: Pupils are equal, round, and reactive to light.   Neck:      Musculoskeletal: Normal range of motion and neck supple.   Cardiovascular:      Rate and Rhythm: Normal rate and regular rhythm.      Heart sounds: Normal heart sounds. No murmur. No friction rub. No gallop.    Pulmonary:      Effort: Pulmonary effort is normal. No respiratory distress.      Breath sounds: Normal breath sounds. No wheezing or rales.      Comments: L lower chest wall TTP, radiates to right posterior ribs  Chest:      Chest wall: Tenderness present.   Abdominal:      General: Bowel sounds are normal. There is no distension.      Palpations: Abdomen is soft. There is no mass.      Tenderness: There is no abdominal tenderness.   Musculoskeletal: Normal range of motion.   Skin:     General: Skin is warm and dry.      Capillary Refill: Capillary refill takes less than 2 seconds.      Findings: No rash.   Neurological:      Mental Status: She is alert.           Emergency Department Course     ECG:  ECG taken at 1343  Normal sinus rhythm  Normal ECG  Rate 74 bpm. WA interval 186 ms. QRS duration 88 ms. QT/QTc 374/415 ms. P-R-T axes 54 22 38.    Imaging:    CT Chest Pulmonary Embolism w Contrast  IMPRESSION:  1.  No evidence of pulmonary embolism. Thoracic aorta is unremarkable.    2.  Lungs are clear. No enlarged lymph nodes.    3.  Fatty infiltration of the liver and prior cholecystectomy changes.  Reading per radiology    US Lower Extremity Venous Duplex Left  IMPRESSION:   No evidence of deep venous thrombosis in the left lower extremity.    Laboratory:  CBC: WBC 7.1, HGB 14.1,    CMP: ALT 53 (H) Creatinine 0.58 o/w WNL  Troponin: (Collected 1422)  <0.015   INR: 0.96    Emergency Department Course:    Reviewed:  I reviewed nursing notes, vitals, past medical history and care everywhere    Assessments:  1359 I obtained history and examined the patient as noted above.   1529 I rechecked the patient. 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      Interventions:  1421 Morphine 4 mg IV     Disposition:  The patient was discharged to home.     Impression & Plan     Medical Decision Making:  Amy Clemens is a 41 year old female who presents with left Sided chest pain for about a week will possible episodes of lower left Leg pain. Patient evaluated here with CT and ultrasound due to concern for PE and DVT. Evaluation is Negative  so far. I doubt cardiac chest pain based on history and labs. She is reassured she had increased stress and this may be to that. Patient asked to follow up with regular doctor as needed and return for precaution is provided.     Diagnosis:    ICD-10-CM    1. Chest pain, unspecified type  R07.9      Scribe Disclosure:  I, Edson Kelley, am serving as a scribe at 2:01 PM on 3/15/2021 to document services personally performed by Jennifer Heath MD based on my observations and the provider's statements to me.          Jennifer Heath MD  03/15/21 [id number]

## 2021-03-15 NOTE — ED TRIAGE NOTES
Arrives with left side chest and upper back pain that started just prior to arrival. Also complains of left leg pain. Alert and oriented, ABCs intact.

## 2021-03-16 LAB — INTERPRETATION ECG - MUSE: NORMAL

## 2021-03-16 NOTE — H&P
3/11/2021  [facility] Nick Ohara MD  OB/Gyn  Tubal occlusion  Dx  Pre-Op Exam    Reason for Visit   Progress Notes          SUBJECTIVE:                                                   Amy Clemens is a 41 year old female who presents to clinic today for the following health issue(s):  Patient presents with:  Pre-Op Exam: 3/18/21: Laparoscopy, possible laparotomy for tubal reanastomosis        HPI: The patient is seen at this time for preoperative clearance for a laparoscopy and possible laparotomy for reversal.  We have reviewed her operative notes and have done a hysterosalpingogram that showed adequate proximal segments.  She does have a right inguinal hernia and was scheduled to have hernia repair but this was rescheduled by her general surgeon.  We will look at this if we end up with an exploration.        Patient's last menstrual period was 2021..      Patient is sexually active, .  Using tubal ligation for contraception.    reports that she has never smoked. She has never used smokeless tobacco.           Health maintenance updated:  yes     Today's PHQ-2 Score:   PHQ-2 (  [employer]) 3/11/2021   Q1: Little interest or pleasure in doing things 0   Q2: Feeling down, depressed or hopeless 0   PHQ-2 Score 0      Today's PHQ-9 Score:   PHQ-9 SCORE 2020   PHQ-9 Total Score 0      Today's CULLEN-7 Score:   CULLEN-7 SCORE 2020   Total Score 0         Problem list and histories reviewed & adjusted, as indicated.  Additional history: as documented.         Patient Active Problem List   Diagnosis     Tubal occlusion             Past Surgical History:   Procedure Laterality Date     CHOLECYSTECTOMY         DILATION AND CURETTAGE, OPERATIVE HYSTEROSCOPY WITH MORCELLATOR, COMBINED N/A 2019     Procedure: hysteroscopy, dilation and curettage, polypectomy with myosure;  Surgeon: Floresita Zimmerman MD;  Location: [facility] OR     DILATION AND CURETTAGE, OPERATIVE  "HYSTEROSCOPY WITH MORCELLATOR, COMBINED N/A 1/13/2020     Procedure: Hysteroscopy, dilation and curettage, endometrial polypectomy;  Surgeon: Floresita Zimmerman MD;  Location: [facility] OR     DILATION AND CURETTAGE, OPERATIVE HYSTEROSCOPY WITH MORCELLATOR, COMBINED N/A 9/29/2020     Procedure: diagnostic HYSTEROSCOPY, POLYPECTOMY WITH MYOSURE MORCELLATOR, sharp DILATION AND CURETTAGE;  Surgeon: Arabella Felix MD;  Location: [facility] OR     TUBAL LIGATION   2006     tubal ligation       Social History           Tobacco Use     Smoking status: Never Smoker     Smokeless tobacco: Never Used   Substance Use Topics     Alcohol use: No       Problem (# of Occurrences) Relation (Name,Age of Onset)     Cerebrovascular Disease (1) Paternal Grandfather     Diabetes (4) Maternal Grandfather, Maternal Grandmother, Paternal Grandmother, Paternal Grandfather     Hyperlipidemia (3) Maternal Grandfather, Maternal Grandmother, Paternal Grandmother     Uterine Cancer (1) Paternal Aunt                   Current Outpatient Medications   Medication Sig     LEVOTHYROXINE SODIUM PO Take 50 mcg by mouth daily      Prenatal Vit-Fe Fumarate-FA (PRENATAL MULTIVITAMIN W/IRON) 27-0.8 MG tablet Take 1 tablet by mouth daily     Vitamin D3 (CHOLECALCIFEROL) 125 MCG (5000 UT) tablet Take 1 tablet by mouth daily      No current facility-administered medications for this visit.       No Known Allergies     ROS:  12 point review of systems negative other than symptoms noted below or in the HPI.  No urinary frequency or dysuria, bladder or kidney problems        OBJECTIVE:      /70   Pulse 76   Ht 1.588 m (5' 2.5\")   Wt 89.8 kg (198 lb)   LMP 03/01/2021   BMI 35.64 kg/m    Body mass index is 35.64 kg/m .     Exam:  Constitutional:  Appearance: Well nourished, well developed alert, in no acute distress  Neck:  Lymph Nodes:  No lymphadenopathy present; Thyroid:  Gland size normal, nontender, no nodules or masses present on palpation  Chest:  " "Respiratory Effort:  Breathing unlabored. Clear to auscultation bilaterally.   Cardiovascular: Heart: Auscultation:  Regular rate, normal rhythm, no murmurs present  Gastrointestinal:  Abdominal Examination:  Abdomen nontender to palpation, tone normal without rigidity or guarding, no masses present, umbilicus without lesions; Liver/Spleen:  No hepatomegaly present, liver nontender to palpation; Hernias:  No hernias present  Lymphatic: Lymph Nodes:  No other lymphadenopathy present  Skin: General Inspection:  No rashes present, no lesions present, no areas of discoloration.  Neurologic:  Mental Status:  Oriented X3.  Normal strength and tone, sensory exam grossly normal, mentation intact and speech normal.    Psychiatric:  Mentation appears normal and affect normal/bright.  No Pelvic Exam performed      In-Clinic Test Results:        ASSESSMENT/PLAN:                                                          41-year-old patient who is cleared for general anesthesia.  We will perform a laparoscopy and possible laparotomy for reversal.  At the time of the surgery we will look at her right inguinal canal and assess her herniation.  The risk and complications of the procedure have been reviewed and accepted.  The patient understands the success and failure rate and a higher rate of ectopic pregnancy.              Nick Herrera MD  [facility] Art      Instructions     After Visit Summary (Automatic SnapShot taken 3/11/2021)  Additional Documentation    Vitals:    /70   Pulse 76   Ht 1.588 m (5' 2.5\")   Wt 89.8 kg (198 lb)   LMP 03/01/2021   BMI 35.64 kg/m    BSA 1.99 m    Flowsheets:    Vitals Reassessment,   NICU VS,   Anthropometrics,   Ambulatory Assessments      Encounter Info:    Billing Info,   History,   Allergies,   Detailed Report      AVS Reports    Date/Time Report Action User   3/11/2021  2:14 PM After Visit Summary Automatically Generated Nick Herrera MD   Encounter " Information     Provider Department Encounter # Center   3/11/2021 1:45 PM Nick Herrera MD [facility] [id number] BERENICE [facility]CMULLEN   Reviewed this Encounter     Medications Problems Allergies History   Elina Quezada MA   Reviewed ADL, Alcohol, Drug Use, Family, Medical, Sexual Activity, Surgical, Tobacco   Communicable/Travel screen    Communicable/Travel Screening 9/25/2020 9/29/2020 3/11/2021 3/15/2021 3/15/2021   In the last month, have you been in contact with someone who was confirmed or suspected to have Coronavirus / COVID-19? No / Unsure No / Unsure No / Unsure No / Unsure No / Unsure   Do you have any of the following symptoms? None of these None of these None of these None of these None of these   Have you traveled internationally in the last month? No No - - -   View Complete Flowsheet ...More Data Exists   Orders Placed     None  Medication Changes       None     Medication List   Visit Diagnoses       Tubal occlusion     Problem List

## 2021-03-17 ENCOUNTER — ANESTHESIA EVENT (OUTPATIENT)
Dept: SURGERY | Facility: CLINIC | Age: 41
End: 2021-03-17

## 2021-03-17 RX ORDER — ACETAMINOPHEN 500 MG
500-1000 TABLET ORAL EVERY 6 HOURS PRN
Status: ON HOLD | COMMUNITY
End: 2023-10-22

## 2021-03-17 RX ORDER — PANTOPRAZOLE SODIUM 20 MG/1
40 TABLET, DELAYED RELEASE ORAL DAILY
Status: ON HOLD | COMMUNITY
End: 2023-10-31

## 2021-03-17 NOTE — PROGRESS NOTES
PTA medications updated by Medication Scribe prior to surgery via phone call with patient        Comments:    Medication history sources: Patient, Surescripts and H&P  Medication history source reliability: Good  Adherence assessment: N/A Not Observed    Significant changes made to the medication list:  None      Additional medication history information:   None        Prior to Admission medications    Medication Sig Last Dose Taking? Auth Provider   acetaminophen (TYLENOL) 500 MG tablet Take 500-1,000 mg by mouth every 6 hours as needed for mild pain  at PRN Yes Reported, Patient   levothyroxine (SYNTHROID/LEVOTHROID) 50 MCG tablet Take 50 mcg by mouth daily  3/16/2021 at AM Yes Reported, Patient   pantoprazole (PROTONIX) 20 MG EC tablet Take 40 mg by mouth daily 3/12/2021 Yes Reported, Patient   Prenatal Vit-Fe Fumarate-FA (PRENATAL MULTIVITAMIN W/IRON) 27-0.8 MG tablet Take 1 tablet by mouth daily 3/16/2021 at am Yes Reported, Patient   Vitamin D3 (CHOLECALCIFEROL) 125 MCG (5000 UT) tablet Take 1 tablet by mouth daily 3/16/2021 at am Yes Reported, Patient

## 2021-03-18 ENCOUNTER — SURGERY (OUTPATIENT)
Age: 41
End: 2021-03-18

## 2021-03-18 ENCOUNTER — ANESTHESIA (OUTPATIENT)
Dept: SURGERY | Facility: CLINIC | Age: 41
End: 2021-03-18

## 2021-03-18 ENCOUNTER — HOSPITAL ENCOUNTER (OUTPATIENT)
Facility: CLINIC | Age: 41
Discharge: HOME | End: 2021-03-19
Attending: OBSTETRICS & GYNECOLOGY | Admitting: OBSTETRICS & GYNECOLOGY

## 2021-03-18 DIAGNOSIS — N97.1 TUBAL OCCLUSION: ICD-10-CM

## 2021-03-18 LAB — B-HCG SERPL-ACNC: <1 IU/L (ref 0–5)

## 2021-03-18 PROCEDURE — 84702 CHORIONIC GONADOTROPIN TEST: CPT | Performed by: OBSTETRICS & GYNECOLOGY

## 2021-03-18 PROCEDURE — 258N000003 HC RX IP 258 OP 636: Performed by: ANESTHESIOLOGY

## 2021-03-18 PROCEDURE — 250N000013 HC RX MED GY IP 250 OP 250 PS 637: Performed by: OBSTETRICS & GYNECOLOGY

## 2021-03-18 PROCEDURE — 272N000001 HC OR GENERAL SUPPLY STERILE: Performed by: OBSTETRICS & GYNECOLOGY

## 2021-03-18 PROCEDURE — 360N000076 HC SURGERY LEVEL 3, PER MIN: Performed by: OBSTETRICS & GYNECOLOGY

## 2021-03-18 PROCEDURE — 999N000141 HC STATISTIC PRE-PROCEDURE NURSING ASSESSMENT: Performed by: OBSTETRICS & GYNECOLOGY

## 2021-03-18 PROCEDURE — 258N000003 HC RX IP 258 OP 636: Performed by: OBSTETRICS & GYNECOLOGY

## 2021-03-18 PROCEDURE — 250N000009 HC RX 250: Performed by: OBSTETRICS & GYNECOLOGY

## 2021-03-18 PROCEDURE — 710N000009 HC RECOVERY PHASE 1, LEVEL 1, PER MIN: Performed by: OBSTETRICS & GYNECOLOGY

## 2021-03-18 PROCEDURE — 250N000011 HC RX IP 250 OP 636: Performed by: ANESTHESIOLOGY

## 2021-03-18 PROCEDURE — 250N000011 HC RX IP 250 OP 636: Performed by: NURSE ANESTHETIST, CERTIFIED REGISTERED

## 2021-03-18 PROCEDURE — 258N000003 HC RX IP 258 OP 636: Performed by: NURSE ANESTHETIST, CERTIFIED REGISTERED

## 2021-03-18 PROCEDURE — 250N000009 HC RX 250: Performed by: NURSE ANESTHETIST, CERTIFIED REGISTERED

## 2021-03-18 PROCEDURE — 36415 COLL VENOUS BLD VENIPUNCTURE: CPT | Performed by: OBSTETRICS & GYNECOLOGY

## 2021-03-18 PROCEDURE — 370N000017 HC ANESTHESIA TECHNICAL FEE, PER MIN: Performed by: OBSTETRICS & GYNECOLOGY

## 2021-03-18 PROCEDURE — 250N000011 HC RX IP 250 OP 636: Performed by: OBSTETRICS & GYNECOLOGY

## 2021-03-18 PROCEDURE — 250N000025 HC SEVOFLURANE, PER MIN: Performed by: OBSTETRICS & GYNECOLOGY

## 2021-03-18 PROCEDURE — 58750 REPAIR OVIDUCT: CPT | Performed by: OBSTETRICS & GYNECOLOGY

## 2021-03-18 RX ORDER — KETOROLAC TROMETHAMINE 30 MG/ML
INJECTION, SOLUTION INTRAMUSCULAR; INTRAVENOUS PRN
Status: DISCONTINUED | OUTPATIENT
Start: 2021-03-18 | End: 2021-03-18

## 2021-03-18 RX ORDER — FENTANYL CITRATE 50 UG/ML
50 INJECTION, SOLUTION INTRAMUSCULAR; INTRAVENOUS
Status: DISCONTINUED | OUTPATIENT
Start: 2021-03-18 | End: 2021-03-18 | Stop reason: HOSPADM

## 2021-03-18 RX ORDER — ONDANSETRON 4 MG/1
4 TABLET, ORALLY DISINTEGRATING ORAL EVERY 6 HOURS PRN
Status: DISCONTINUED | OUTPATIENT
Start: 2021-03-18 | End: 2021-03-19 | Stop reason: HOSPADM

## 2021-03-18 RX ORDER — FENTANYL CITRATE 50 UG/ML
INJECTION, SOLUTION INTRAMUSCULAR; INTRAVENOUS PRN
Status: DISCONTINUED | OUTPATIENT
Start: 2021-03-18 | End: 2021-03-18

## 2021-03-18 RX ORDER — SODIUM CHLORIDE, SODIUM LACTATE, POTASSIUM CHLORIDE, CALCIUM CHLORIDE 600; 310; 30; 20 MG/100ML; MG/100ML; MG/100ML; MG/100ML
INJECTION, SOLUTION INTRAVENOUS CONTINUOUS
Status: DISCONTINUED | OUTPATIENT
Start: 2021-03-18 | End: 2021-03-19 | Stop reason: HOSPADM

## 2021-03-18 RX ORDER — LIDOCAINE HYDROCHLORIDE 20 MG/ML
INJECTION, SOLUTION INFILTRATION; PERINEURAL PRN
Status: DISCONTINUED | OUTPATIENT
Start: 2021-03-18 | End: 2021-03-18

## 2021-03-18 RX ORDER — NALOXONE HYDROCHLORIDE 0.4 MG/ML
0.4 INJECTION, SOLUTION INTRAMUSCULAR; INTRAVENOUS; SUBCUTANEOUS
Status: ACTIVE | OUTPATIENT
Start: 2021-03-18 | End: 2021-03-19

## 2021-03-18 RX ORDER — CEFAZOLIN SODIUM 2 G/100ML
2 INJECTION, SOLUTION INTRAVENOUS
Status: DISCONTINUED | OUTPATIENT
Start: 2021-03-18 | End: 2021-03-18 | Stop reason: HOSPADM

## 2021-03-18 RX ORDER — OXYCODONE HYDROCHLORIDE 5 MG/1
5-10 TABLET ORAL
Qty: 36 TABLET | Refills: 0 | Status: SHIPPED | OUTPATIENT
Start: 2021-03-18 | End: 2021-04-15

## 2021-03-18 RX ORDER — SODIUM CHLORIDE, SODIUM LACTATE, POTASSIUM CHLORIDE, CALCIUM CHLORIDE 600; 310; 30; 20 MG/100ML; MG/100ML; MG/100ML; MG/100ML
INJECTION, SOLUTION INTRAVENOUS CONTINUOUS PRN
Status: DISCONTINUED | OUTPATIENT
Start: 2021-03-18 | End: 2021-03-18

## 2021-03-18 RX ORDER — NALOXONE HYDROCHLORIDE 0.4 MG/ML
0.2 INJECTION, SOLUTION INTRAMUSCULAR; INTRAVENOUS; SUBCUTANEOUS
Status: ACTIVE | OUTPATIENT
Start: 2021-03-18 | End: 2021-03-19

## 2021-03-18 RX ORDER — ONDANSETRON 2 MG/ML
4 INJECTION INTRAMUSCULAR; INTRAVENOUS EVERY 6 HOURS PRN
Status: DISCONTINUED | OUTPATIENT
Start: 2021-03-18 | End: 2021-03-19 | Stop reason: HOSPADM

## 2021-03-18 RX ORDER — DEXAMETHASONE SODIUM PHOSPHATE 4 MG/ML
INJECTION, SOLUTION INTRA-ARTICULAR; INTRALESIONAL; INTRAMUSCULAR; INTRAVENOUS; SOFT TISSUE PRN
Status: DISCONTINUED | OUTPATIENT
Start: 2021-03-18 | End: 2021-03-18

## 2021-03-18 RX ORDER — ONDANSETRON 2 MG/ML
4 INJECTION INTRAMUSCULAR; INTRAVENOUS EVERY 30 MIN PRN
Status: DISCONTINUED | OUTPATIENT
Start: 2021-03-18 | End: 2021-03-18

## 2021-03-18 RX ORDER — PROPOFOL 10 MG/ML
INJECTION, EMULSION INTRAVENOUS CONTINUOUS PRN
Status: DISCONTINUED | OUTPATIENT
Start: 2021-03-18 | End: 2021-03-18

## 2021-03-18 RX ORDER — ACETAMINOPHEN 325 MG/1
975 TABLET ORAL ONCE
Status: COMPLETED | OUTPATIENT
Start: 2021-03-18 | End: 2021-03-18

## 2021-03-18 RX ORDER — PROPOFOL 10 MG/ML
INJECTION, EMULSION INTRAVENOUS PRN
Status: DISCONTINUED | OUTPATIENT
Start: 2021-03-18 | End: 2021-03-18

## 2021-03-18 RX ORDER — HYDROMORPHONE HYDROCHLORIDE 1 MG/ML
0.3 INJECTION, SOLUTION INTRAMUSCULAR; INTRAVENOUS; SUBCUTANEOUS
Status: DISCONTINUED | OUTPATIENT
Start: 2021-03-18 | End: 2021-03-19 | Stop reason: HOSPADM

## 2021-03-18 RX ORDER — HYDROMORPHONE HYDROCHLORIDE 1 MG/ML
.3-.5 INJECTION, SOLUTION INTRAMUSCULAR; INTRAVENOUS; SUBCUTANEOUS EVERY 10 MIN PRN
Status: DISCONTINUED | OUTPATIENT
Start: 2021-03-18 | End: 2021-03-18

## 2021-03-18 RX ORDER — ONDANSETRON 4 MG/1
4 TABLET, ORALLY DISINTEGRATING ORAL EVERY 30 MIN PRN
Status: DISCONTINUED | OUTPATIENT
Start: 2021-03-18 | End: 2021-03-18

## 2021-03-18 RX ORDER — HEPARIN SODIUM 1000 [USP'U]/ML
INJECTION, SOLUTION INTRAVENOUS; SUBCUTANEOUS
Status: DISCONTINUED
Start: 2021-03-18 | End: 2021-03-18 | Stop reason: HOSPADM

## 2021-03-18 RX ORDER — CEFAZOLIN SODIUM 2 G/100ML
2 INJECTION, SOLUTION INTRAVENOUS EVERY 8 HOURS
Status: COMPLETED | OUTPATIENT
Start: 2021-03-18 | End: 2021-03-18

## 2021-03-18 RX ORDER — OXYCODONE HYDROCHLORIDE 5 MG/1
5-10 TABLET ORAL
Status: DISCONTINUED | OUTPATIENT
Start: 2021-03-18 | End: 2021-03-19 | Stop reason: HOSPADM

## 2021-03-18 RX ORDER — ONDANSETRON 2 MG/ML
INJECTION INTRAMUSCULAR; INTRAVENOUS PRN
Status: DISCONTINUED | OUTPATIENT
Start: 2021-03-18 | End: 2021-03-18

## 2021-03-18 RX ORDER — FENTANYL CITRATE 50 UG/ML
25-50 INJECTION, SOLUTION INTRAMUSCULAR; INTRAVENOUS
Status: DISCONTINUED | OUTPATIENT
Start: 2021-03-18 | End: 2021-03-18

## 2021-03-18 RX ORDER — CEFAZOLIN SODIUM 2 G/100ML
2 INJECTION, SOLUTION INTRAVENOUS SEE ADMIN INSTRUCTIONS
Status: DISCONTINUED | OUTPATIENT
Start: 2021-03-18 | End: 2021-03-18 | Stop reason: HOSPADM

## 2021-03-18 RX ORDER — MEPERIDINE HYDROCHLORIDE 25 MG/ML
12.5 INJECTION INTRAMUSCULAR; INTRAVENOUS; SUBCUTANEOUS
Status: DISCONTINUED | OUTPATIENT
Start: 2021-03-18 | End: 2021-03-18

## 2021-03-18 RX ORDER — MAGNESIUM HYDROXIDE 1200 MG/15ML
LIQUID ORAL PRN
Status: DISCONTINUED | OUTPATIENT
Start: 2021-03-18 | End: 2021-03-18 | Stop reason: HOSPADM

## 2021-03-18 RX ORDER — NEOSTIGMINE METHYLSULFATE 1 MG/ML
VIAL (ML) INJECTION PRN
Status: DISCONTINUED | OUTPATIENT
Start: 2021-03-18 | End: 2021-03-18

## 2021-03-18 RX ORDER — BUPIVACAINE HYDROCHLORIDE 2.5 MG/ML
INJECTION, SOLUTION INFILTRATION; PERINEURAL PRN
Status: DISCONTINUED | OUTPATIENT
Start: 2021-03-18 | End: 2021-03-18 | Stop reason: HOSPADM

## 2021-03-18 RX ORDER — BUPIVACAINE HYDROCHLORIDE 2.5 MG/ML
INJECTION, SOLUTION EPIDURAL; INFILTRATION; INTRACAUDAL
Status: DISCONTINUED
Start: 2021-03-18 | End: 2021-03-18 | Stop reason: HOSPADM

## 2021-03-18 RX ORDER — GLYCOPYRROLATE 0.2 MG/ML
INJECTION, SOLUTION INTRAMUSCULAR; INTRAVENOUS PRN
Status: DISCONTINUED | OUTPATIENT
Start: 2021-03-18 | End: 2021-03-18

## 2021-03-18 RX ADMIN — HYDROMORPHONE HYDROCHLORIDE 0.3 MG: 1 INJECTION, SOLUTION INTRAMUSCULAR; INTRAVENOUS; SUBCUTANEOUS at 23:36

## 2021-03-18 RX ADMIN — FENTANYL CITRATE 50 MCG: 0.05 INJECTION, SOLUTION INTRAMUSCULAR; INTRAVENOUS at 10:01

## 2021-03-18 RX ADMIN — PROPOFOL 30 MCG/KG/MIN: 10 INJECTION, EMULSION INTRAVENOUS at 07:35

## 2021-03-18 RX ADMIN — HEPARIN SODIUM 1000 ML: 1000 INJECTION, SOLUTION INTRAVENOUS; SUBCUTANEOUS at 07:59

## 2021-03-18 RX ADMIN — ONDANSETRON 4 MG: 2 INJECTION INTRAMUSCULAR; INTRAVENOUS at 16:34

## 2021-03-18 RX ADMIN — KETOROLAC TROMETHAMINE 30 MG: 30 INJECTION, SOLUTION INTRAMUSCULAR at 09:02

## 2021-03-18 RX ADMIN — NEOSTIGMINE METHYLSULFATE 5 MG: 1 INJECTION, SOLUTION INTRAVENOUS at 09:04

## 2021-03-18 RX ADMIN — ONDANSETRON 4 MG: 2 INJECTION INTRAMUSCULAR; INTRAVENOUS at 07:55

## 2021-03-18 RX ADMIN — GLYCOPYRROLATE 0.8 MG: 0.2 INJECTION, SOLUTION INTRAMUSCULAR; INTRAVENOUS at 09:04

## 2021-03-18 RX ADMIN — MIDAZOLAM 2 MG: 1 INJECTION INTRAMUSCULAR; INTRAVENOUS at 07:20

## 2021-03-18 RX ADMIN — CEFAZOLIN SODIUM 2 G: 2 INJECTION, SOLUTION INTRAVENOUS at 15:23

## 2021-03-18 RX ADMIN — SODIUM CHLORIDE, POTASSIUM CHLORIDE, SODIUM LACTATE AND CALCIUM CHLORIDE 500 ML: 600; 310; 30; 20 INJECTION, SOLUTION INTRAVENOUS at 10:46

## 2021-03-18 RX ADMIN — ACETAMINOPHEN 975 MG: 325 TABLET, FILM COATED ORAL at 06:00

## 2021-03-18 RX ADMIN — DEXAMETHASONE SODIUM PHOSPHATE 4 MG: 4 INJECTION, SOLUTION INTRA-ARTICULAR; INTRALESIONAL; INTRAMUSCULAR; INTRAVENOUS; SOFT TISSUE at 07:40

## 2021-03-18 RX ADMIN — HYDROMORPHONE HYDROCHLORIDE 0.3 MG: 1 INJECTION, SOLUTION INTRAMUSCULAR; INTRAVENOUS; SUBCUTANEOUS at 14:24

## 2021-03-18 RX ADMIN — CEFAZOLIN SODIUM 2 G: 2 INJECTION, SOLUTION INTRAVENOUS at 23:22

## 2021-03-18 RX ADMIN — OXYCODONE HYDROCHLORIDE 5 MG: 5 TABLET ORAL at 12:44

## 2021-03-18 RX ADMIN — PROPOFOL 200 MG: 10 INJECTION, EMULSION INTRAVENOUS at 07:28

## 2021-03-18 RX ADMIN — BUPIVACAINE HYDROCHLORIDE 17 ML: 2.5 INJECTION, SOLUTION EPIDURAL; INFILTRATION; INTRACAUDAL; PERINEURAL at 09:05

## 2021-03-18 RX ADMIN — CEFAZOLIN SODIUM 2 G: 2 INJECTION, SOLUTION INTRAVENOUS at 07:35

## 2021-03-18 RX ADMIN — SODIUM CHLORIDE, POTASSIUM CHLORIDE, SODIUM LACTATE AND CALCIUM CHLORIDE: 600; 310; 30; 20 INJECTION, SOLUTION INTRAVENOUS at 07:20

## 2021-03-18 RX ADMIN — HYDROMORPHONE HYDROCHLORIDE 0.5 MG: 1 INJECTION, SOLUTION INTRAMUSCULAR; INTRAVENOUS; SUBCUTANEOUS at 09:44

## 2021-03-18 RX ADMIN — SODIUM CHLORIDE 1000 ML: 900 IRRIGANT IRRIGATION at 08:00

## 2021-03-18 RX ADMIN — HYDROMORPHONE HYDROCHLORIDE 0.5 MG: 1 INJECTION, SOLUTION INTRAMUSCULAR; INTRAVENOUS; SUBCUTANEOUS at 08:10

## 2021-03-18 RX ADMIN — LIDOCAINE HYDROCHLORIDE 100 MG: 20 INJECTION, SOLUTION INFILTRATION; PERINEURAL at 07:28

## 2021-03-18 RX ADMIN — FENTANYL CITRATE 50 MCG: 50 INJECTION, SOLUTION INTRAMUSCULAR; INTRAVENOUS at 09:07

## 2021-03-18 RX ADMIN — OXYCODONE HYDROCHLORIDE 5 MG: 5 TABLET ORAL at 22:12

## 2021-03-18 RX ADMIN — ROCURONIUM BROMIDE 50 MG: 10 INJECTION INTRAVENOUS at 07:28

## 2021-03-18 RX ADMIN — FENTANYL CITRATE 100 MCG: 50 INJECTION, SOLUTION INTRAMUSCULAR; INTRAVENOUS at 07:28

## 2021-03-18 RX ADMIN — METHYLENE BLUE 10 ML: 5 INJECTION INTRAVENOUS at 08:38

## 2021-03-18 ASSESSMENT — ENCOUNTER SYMPTOMS
SEIZURES: 0
ORTHOPNEA: 0
DYSRHYTHMIAS: 0

## 2021-03-18 ASSESSMENT — MIFFLIN-ST. JEOR: SCORE: 1532.69

## 2021-03-18 ASSESSMENT — LIFESTYLE VARIABLES: TOBACCO_USE: 0

## 2021-03-18 ASSESSMENT — COPD QUESTIONNAIRES: COPD: 0

## 2021-03-18 NOTE — ANESTHESIA PREPROCEDURE EVALUATION
Anesthesia Pre-Procedure Evaluation    Patient: Amy Clemens   MRN: [id number] : 1980        Preoperative Diagnosis: Tubal occlusion [N97.1]   Procedure : Procedure(s):  Laparoscopy, possible laparotomy for tubal reanastomosis  REANASTOMOSIS, FALLOPIAN TUBE     Past Medical History:   Diagnosis Date     Endometriosis      Gastroesophageal reflux disease      Thyroid disease      Tubal occlusion       Past Surgical History:   Procedure Laterality Date     CHOLECYSTECTOMY       DILATION AND CURETTAGE, OPERATIVE HYSTEROSCOPY WITH MORCELLATOR, COMBINED N/A 2019    Procedure: hysteroscopy, dilation and curettage, polypectomy with myosure;  Surgeon: Floresita Zimmerman MD;  Location: [facility] OR     DILATION AND CURETTAGE, OPERATIVE HYSTEROSCOPY WITH MORCELLATOR, COMBINED N/A 2020    Procedure: Hysteroscopy, dilation and curettage, endometrial polypectomy;  Surgeon: Floresita Zimmerman MD;  Location: [facility] OR     DILATION AND CURETTAGE, OPERATIVE HYSTEROSCOPY WITH MORCELLATOR, COMBINED N/A 2020    Procedure: diagnostic HYSTEROSCOPY, POLYPECTOMY WITH MYOSURE MORCELLATOR, sharp DILATION AND CURETTAGE;  Surgeon: Arabella Felix MD;  Location: [facility] OR     TUBAL LIGATION  2006    tubal ligation      No Known Allergies   Social History     Tobacco Use     Smoking status: Never Smoker     Smokeless tobacco: Never Used   Substance Use Topics     Alcohol use: No      Wt Readings from Last 1 Encounters:   21 89.8 kg (198 lb)        Anesthesia Evaluation   Pt has had prior anesthetic. Type: General.    No history of anesthetic complications       ROS/MED HX  ENT/Pulmonary:    (-) tobacco use, asthma, COPD, sleep apnea and recent URI   Neurologic:    (-) no seizures and no CVA   Cardiovascular:     (+) -----Previous cardiac testing   Echo: Date: Results:    Stress Test: Date: Results:    ECG Reviewed: Date: Results:  NSR, no significant abnormalities  Cath: Date: Results:   (-) angina, hypertension, CAD, CHF,  BHANDARI, orthopnea/PND, syncope, arrhythmias, irregular heartbeat/palpitations, valvular problems/murmurs, angina, murmur and wheezes   METS/Exercise Tolerance: >4 METS    Hematologic:       Musculoskeletal:       GI/Hepatic:     (+) Other,  (-) GERD and liver disease   Renal/Genitourinary:    (-) renal disease   Endo:     (+) thyroid problem, hypothyroidism, Obesity,  (-) Type II DM and chronic steroid usage   Psychiatric/Substance Use:    (-) alcohol abuse history   Infectious Disease:       Malignancy:       Other:            Physical Exam    Airway        Mallampati: II   TM distance: > 3 FB   Neck ROM: full   Mouth opening: > 3 cm    Respiratory Devices and Support         Dental  no notable dental history         Cardiovascular          Rhythm and rate: regular and normal (-) no murmur    Pulmonary           breath sounds clear to auscultation   (-) no rhonchi and no wheezes        OUTSIDE LABS:  CBC:   Lab Results   Component Value Date    WBC 7.1 03/15/2021    WBC 8.3 04/10/2020    HGB 14.1 03/15/2021    HGB 10.7 (L) 04/10/2020    HCT 44.9 03/15/2021    HCT 33.2 (L) 04/10/2020     03/15/2021     04/10/2020     BMP:   Lab Results   Component Value Date     03/15/2021     04/10/2020    POTASSIUM 3.7 03/15/2021    POTASSIUM 3.6 04/10/2020    CHLORIDE 105 03/15/2021    CHLORIDE 108 04/10/2020    CO2 28 03/15/2021    CO2 24 04/10/2020    BUN 10 03/15/2021    BUN 11 04/10/2020    CR 0.58 03/15/2021    CR 0.48 (L) 04/10/2020    GLC 92 03/15/2021     (H) 04/10/2020     COAGS:   Lab Results   Component Value Date    INR 0.96 03/15/2021     POC:   Lab Results   Component Value Date    HCG Negative 09/29/2020     HEPATIC:   Lab Results   Component Value Date    ALBUMIN 3.5 03/15/2021    PROTTOTAL 7.0 03/15/2021    ALT 53 (H) 03/15/2021    AST 24 03/15/2021    ALKPHOS 81 03/15/2021    BILITOTAL 0.3 03/15/2021     OTHER:   Lab Results   Component Value Date    DAVID 8.7 03/15/2021    LIPASE 164  03/11/2018    TSH 1.20 04/10/2020    SED 7 10/15/2016       Anesthesia Plan    ASA Status:  2   NPO Status:  NPO Appropriate    Anesthesia Type: General.     - Airway: ETT   Induction: Intravenous, Propofol.   Maintenance: Balanced.        Consents    Anesthesia Plan(s) and associated risks, benefits, and realistic alternatives discussed. Questions answered and patient/representative(s) expressed understanding.     - Discussed with:  Patient         Postoperative Care    Pain management: Multi-modal analgesia.   PONV prophylaxis: Ondansetron (or other 5HT-3), Background Propofol Infusion, Dexamethasone or Solumedrol     Comments:                Vince Silva MD

## 2021-03-18 NOTE — BRIEF OP NOTE
[facility] Brief Operative Note    Pre-operative diagnosis: Tubal occlusion [N97.1]   Post-operative diagnosis same   Procedure: Procedure(s):  Laparoscopy,  laparotomy for tubal reanastomosis  REANASTOMOSIS, FALLOPIAN TUBES   Surgeon(s): Surgeon(s) and Role:     * Nick Herrera MD - Primary   Estimated blood loss: 15 mL    Specimens: NONE   Findings: GOOD ALIGNMENT

## 2021-03-18 NOTE — ANESTHESIA PROCEDURE NOTES
Airway       Patient location during procedure: OR ([facility] - Operating Room or Procedural Area)  Staff -        CRNA: Lani Shin APRN CRNA       Performed By: CRNA  Consent for Airway        Urgency: elective  Indications and Patient Condition       Indications for airway management: kori-procedural       Induction type:intravenous       Mask difficulty assessment: 2 - vent by mask + OA or adjuvant +/- NMBA    Final Airway Details       Final airway type: endotracheal airway       Successful airway: ETT - single  Endotracheal Airway Details        ETT size (mm): 7.0       Cuffed: yes       Successful intubation technique: direct laryngoscopy       DL Blade Type: Carvajal 2       Grade View of Cords: 1       Adjucts: stylet       Position: Right       Measured from: gums/teeth       Secured at (cm): 21       Bite block used: None    Post intubation assessment        Number of attempts at approach: 1       Number of other approaches attempted: 0       Secured with: silk tape       Ease of procedure: easy       Dentition: Intact and Unchanged    Medication(s) Administered   Medication Administration Time: 3/18/2021 7:31 AM

## 2021-03-18 NOTE — OP NOTE
Procedure Date: 03/18/2021      REASON FOR ADMISSION:  Tubal occlusion.      PROCEDURES PERFORMED:  Laparoscopy, laparotomy, bilateral tubal-tubal reanastomosis.      OPERATIVE FINDINGS:  Adequate architecture on both sides for reanastomosis.      POSTOPERATIVE DIAGNOSIS:  Tubal occlusion.      DESCRIPTION OF PROCEDURE:  After general anesthesia was induced, the patient was placed in the dorsal lithotomy position and prepped and draped in the usual fashion.  A Gutiérrez catheter was placed.  A uterine manipulator and insufflator were placed.      Through a subumbilical incision, the Veress needle was placed and 3 liters of CO2 were insufflated.  The laparoscope, trocar and sheath were placed without incident.  A 5 mm suprapubic trocar was placed.  There was adequate distal tube, but short segments bilaterally.  At this point, the decision was made to move forward.  The laparoscopy was abandoned and the subumbilical incision closed with 4-0 Vicryl and Steri-Strips.      A Pfannenstiel incision was made and carried to the peritoneal cavity.  The bowel was packed out of the pelvis.  Of note, the patient was told she had an inguinal hernia, but her canal while generous was certainly not a hernia.      Lap pads were placed to keep the bowel out of the pelvis.  The distal right fallopian tube was back cannulated and cross cut at its proximal end.  The distal end of the proximal tube was cross cut and dye insufflated from below.  Scar tissue was removed in the mesosalpinx.  The tube segments were aligned over a stent.  The mesosalpinx was approximated with 5-0 Vicryl interrupted sutures.  Six 6-0 Vicryl serosal and muscularis stitches provided excellent alignment of the tube.  The stent was removed and dye quickly filled and spilled without leakage at the anastomotic site.      An identical procedure was performed on the left with good alignment and approximation of the mesosalpinx with 5-0 Vicryl; 6-0 Vicryl serosal and  muscularis stitches provided excellent alignment of the tube.  After removal of the stent, there was good fill and spill without leakage at the anastomotic site.      Copious irrigation was undertaken.  The lap pads were removed and retractor removed.      The anterior peritoneum was closed with 2-0 Vicryl.  The fascia was closed with 0 Vicryl running and 5 interrupted sutures.  The subcutaneous space was irrigated and closed with 3-0 plain.  The skin was closed with 4-0 Vicryl running subcuticular stitches and Steri-Strips.  The estimated blood loss was 15 mL.  The patient tolerated this well and went to the recovery room in satisfactory condition.         DAVE CLAYTON JR, MD             D: 2021   T: 2021   MT: AZUL      Name:     TIM PONCE   MRN:      [id number]        Account:        [id number]   :      1980           Procedure Date: 2021      Document: [id number]

## 2021-03-18 NOTE — ANESTHESIA CARE TRANSFER NOTE
Patient: Amy Clemens    Procedure(s):  Laparoscopy,  laparotomy for tubal reanastomosis  REANASTOMOSIS, FALLOPIAN TUBE    Diagnosis: Tubal occlusion [N97.1]  Diagnosis Additional Information: No value filed.    Anesthesia Type:   General     Note:    Oropharynx: oropharynx clear of all foreign objects and spontaneously breathing  Level of Consciousness: drowsy  Oxygen Supplementation: face mask  Level of Supplemental Oxygen (L/min / FiO2): 4  Independent Airway: airway patency satisfactory and stable  Dentition: dentition unchanged  Vital Signs Stable: post-procedure vital signs reviewed and stable  Report to RN Given: handoff report given  Patient transferred to: PACU    Handoff Report: Identifed the Patient, Identified the Reponsible Provider, Reviewed the pertinent medical history, Discussed the surgical course, Reviewed Intra-OP anesthesia mangement and issues during anesthesia, Set expectations for post-procedure period and Allowed opportunity for questions and acknowledgement of understanding      Vitals: (Last set prior to Anesthesia Care Transfer)  CRNA VITALS  3/18/2021 0854 - 3/18/2021 0930      3/18/2021             SpO2:  96 %    Resp Rate (set):  10        Electronically Signed By: KYRIE Roche CRNA  March 18, 2021  9:30 AM

## 2021-03-18 NOTE — PLAN OF CARE
VSS. A/O. SBA. Abdo incision CDI. Oxy/dilaudid/ice pack given. Emesis once, otherwise tolerating diet. -bs/gas. Good UOP ricketts.

## 2021-03-19 VITALS
OXYGEN SATURATION: 96 % | RESPIRATION RATE: 18 BRPM | DIASTOLIC BLOOD PRESSURE: 60 MMHG | WEIGHT: 202.7 LBS | TEMPERATURE: 98.8 F | HEIGHT: 62 IN | HEART RATE: 71 BPM | BODY MASS INDEX: 37.3 KG/M2 | SYSTOLIC BLOOD PRESSURE: 105 MMHG

## 2021-03-19 PROCEDURE — 250N000013 HC RX MED GY IP 250 OP 250 PS 637: Performed by: OBSTETRICS & GYNECOLOGY

## 2021-03-19 PROCEDURE — G0378 HOSPITAL OBSERVATION PER HR: HCPCS

## 2021-03-19 RX ADMIN — OXYCODONE HYDROCHLORIDE 5 MG: 5 TABLET ORAL at 18:19

## 2021-03-19 RX ADMIN — OXYCODONE HYDROCHLORIDE 5 MG: 5 TABLET ORAL at 08:05

## 2021-03-19 RX ADMIN — OXYCODONE HYDROCHLORIDE 5 MG: 5 TABLET ORAL at 13:15

## 2021-03-19 NOTE — PLAN OF CARE
Alert and oriented x4. Vital signs stable on RA. Up with SBA. Ambulated in hallway x1. Tolerating diet. Lungs sounds clear. Bowel sounds active. Denies flatus. Abdominal incision, CDI. Pain managed with PRN Oxycodone and Dilaudid. Denies nausea.

## 2021-03-19 NOTE — ANESTHESIA POSTPROCEDURE EVALUATION
Patient: Amy Clemens    Procedure(s):  Laparoscopy,  laparotomy for tubal reanastomosis  REANASTOMOSIS, FALLOPIAN TUBE    Diagnosis:Tubal occlusion [N97.1]  Diagnosis Additional Information: No value filed.    Anesthesia Type:  General    Note:  Disposition: Outpatient   Postop Pain Control: Uneventful            Sign Out: Well controlled pain   PONV: No   Neuro/Psych: Uneventful            Sign Out: Acceptable/Baseline neuro status   Airway/Respiratory: Uneventful            Sign Out: Acceptable/Baseline resp. status   CV/Hemodynamics: Uneventful            Sign Out: Acceptable CV status   Other NRE: NONE   DID A NON-ROUTINE EVENT OCCUR? No    Event details/Postop Comments:  Pt doing well prior to discharge home.           Last vitals:  Vitals:    03/18/21 1439 03/18/21 1652 03/18/21 1925   BP:  115/77 105/63   Pulse:  99 101   Resp: 18 18 18   Temp:  37  C (98.6  F) 37.2  C (99  F)   SpO2:  95% 94%       Last vitals prior to Anesthesia Care Transfer:  CRNA VITALS  3/18/2021 0854 - 3/18/2021 0954      3/18/2021             SpO2:  96 %    Resp Rate (set):  10          Electronically Signed By: Vince Silva MD  March 18, 2021  10:13 PM

## 2021-03-19 NOTE — DISCHARGE INSTRUCTIONS
Today you were given 975 mg of Tylenol at 0630am. The recommended daily maximum dose is 4000 mg.     .  Discharge Instructions following Gynecological   Laparoscopy, Hysteroscopy, or Pelviscopy  [facility]    Please return to the clinic in:               2 weeks;            Make this appointment after you get home.  Note:   Do not drive a car, drink alcohol, or use machinery for the next 24 hours or while taking narcotic pain medications.  You should wait until you have recovered before making any important decisions.  Diet:    Diet as tolerated.  A bland diet or light diet is advisable the day of surgery or if you have any nausea.  Drink plenty of fluids.  Activity:    May resume normal activity as soon as abdominal pain disappears.  Listen to your body; if it hurts, don t do it.  Most women can return to work after 24 hours.  Avoid douches, tampons, and intercourse for 1-2 weeks.  Bathing/Incision Care:    May shower as desired but avoid tub baths (submerging incision) until incisions are healed.    Steri-Strips (small, white tape) will fall off on their own in 7-10 days.    There is usually a small suture (stitch) in the incision under the skin that will dissolve on its own and will not need to be removed.  Your doctor will tell you if you have any sutures that require removal.  What to Expect:    Pain: You may have cramps, shoulder pain, or a low backache for 24-48 hours.  Your doctor will prescribe or advise which pain medications to take.  Short, frequent walks may help alleviate gas pain/discomfort.    Bleeding or vaginal discharge: You may have some bleeding or discharge for a week or longer.    Fever: You may have a low fever for the first two days.     Nausea: If you have nausea (feel sick to your stomach), stay in bed.  Try drinking small amounts of 7-Up, tea, or soup.    Dizziness/Weakness: You may feel dizzy or weak for a few days.  If so, you should rest often,  stand up slowly, and use caution when climbing stairs.  Notify your surgeon for the following signs and symptoms:    Temperature greater than 100.4 oF    Vaginal bleeding in excess of one pad (sanitary napkin) per hour    Uncontrolled pain    Incisions becoming more swollen, warm, reddened, or painful    Abdomen feels firm or swollen

## 2021-03-20 NOTE — PLAN OF CARE
VSS. A/O. Ind. Abdo incision CDI. Oxy given. Tolerating diet. Voiding fine. D'c home, meds and d'c instruction given. No concerns.

## 2021-03-30 ENCOUNTER — OFFICE VISIT (OUTPATIENT)
Dept: OBGYN | Facility: CLINIC | Age: 41
End: 2021-03-30
Payer: COMMERCIAL

## 2021-03-30 VITALS
WEIGHT: 199 LBS | SYSTOLIC BLOOD PRESSURE: 104 MMHG | HEIGHT: 62 IN | DIASTOLIC BLOOD PRESSURE: 76 MMHG | HEART RATE: 68 BPM | BODY MASS INDEX: 36.62 KG/M2

## 2021-03-30 DIAGNOSIS — Z09 POSTOP CHECK: Primary | ICD-10-CM

## 2021-03-30 PROCEDURE — 99024 POSTOP FOLLOW-UP VISIT: CPT | Performed by: OBSTETRICS & GYNECOLOGY

## 2021-03-30 ASSESSMENT — MIFFLIN-ST. JEOR: SCORE: 1515.91

## 2021-03-30 NOTE — PROGRESS NOTES
The patient is seen at this time for her postoperative check.  She had a bilateral reanastomosis performed on 3/18/2021.  The procedure went well.  The patient has had no bleeding and her incision is healing well.  She understands that she is not to attempt pregnancy for at least 4 months.  When she has a positive pregnancy test she needs to be seen with quantitative hCGs and early ultrasound due to her high rate of ectopic pregnancies.

## 2021-04-15 ENCOUNTER — OFFICE VISIT (OUTPATIENT)
Dept: OBGYN | Facility: CLINIC | Age: 41
End: 2021-04-15
Payer: COMMERCIAL

## 2021-04-15 VITALS
DIASTOLIC BLOOD PRESSURE: 76 MMHG | BODY MASS INDEX: 37.17 KG/M2 | SYSTOLIC BLOOD PRESSURE: 118 MMHG | WEIGHT: 202 LBS | HEART RATE: 68 BPM | HEIGHT: 62 IN

## 2021-04-15 DIAGNOSIS — T81.31XD POSTOPERATIVE WOUND DEHISCENCE, SUBSEQUENT ENCOUNTER: Primary | ICD-10-CM

## 2021-04-15 PROCEDURE — 99212 OFFICE O/P EST SF 10 MIN: CPT | Performed by: OBSTETRICS & GYNECOLOGY

## 2021-04-15 ASSESSMENT — MIFFLIN-ST. JEOR: SCORE: 1529.52

## 2021-04-15 NOTE — PROGRESS NOTES
SUBJECTIVE:                                                   Amy Clemens is a 41 year old female who presents to clinic today for the following health issue(s):  Patient presents with:  Wound Check: here to have incision checked.      HPI: Patient is seen at this time for wound check.  She underwent a laparotomy for reanastomosis and has had 2 small areas of separation.  There is no sign of infection at this time and she has no fever or chills.      Patient's last menstrual period was 2021..     Patient is not sexually active, .  Using not sexually active for contraception.    reports that she has never smoked. She has never used smokeless tobacco.    STD testing offered?  Declined    Health maintenance updated:  yes    Today's PHQ-2 Score:   PHQ-2 (  [employer]) 4/15/2021   Q1: Little interest or pleasure in doing things 0   Q2: Feeling down, depressed or hopeless 0   PHQ-2 Score 0     Today's PHQ-9 Score:   PHQ-9 SCORE 2020   PHQ-9 Total Score 0     Today's CULLEN-7 Score:   CULLEN-7 SCORE 2020   Total Score 0       Problem list and histories reviewed & adjusted, as indicated.  Additional history: as documented.    Patient Active Problem List   Diagnosis     Tubal occlusion     Past Surgical History:   Procedure Laterality Date     CHOLECYSTECTOMY       DILATION AND CURETTAGE, OPERATIVE HYSTEROSCOPY WITH MORCELLATOR, COMBINED N/A 2019    Procedure: hysteroscopy, dilation and curettage, polypectomy with myosure;  Surgeon: Floresita Zimmerman MD;  Location: [facility] OR     DILATION AND CURETTAGE, OPERATIVE HYSTEROSCOPY WITH MORCELLATOR, COMBINED N/A 2020    Procedure: Hysteroscopy, dilation and curettage, endometrial polypectomy;  Surgeon: Floresita Zimmerman MD;  Location: [facility] OR     DILATION AND CURETTAGE, OPERATIVE HYSTEROSCOPY WITH MORCELLATOR, COMBINED N/A 2020    Procedure: diagnostic HYSTEROSCOPY, POLYPECTOMY WITH MYOSURE MORCELLATOR, sharp DILATION AND CURETTAGE;  Surgeon: Isidro  "Arabella Dominguez MD;  Location:  OR     LAPAROSCOPY DIAGNOSTIC (GYN) N/A 3/18/2021    Procedure: Laparoscopy,  laparotomy for tubal reanastomosis;  Surgeon: Nick Herrera MD;  Location:  OR     TUBAL LIGATION  2006    tubal ligation     TUBOPLASTY REANASTOMOSIS N/A 3/18/2021    Procedure: REANASTOMOSIS, FALLOPIAN TUBE;  Surgeon: Nick Herrera MD;  Location:  OR      Social History     Tobacco Use     Smoking status: Never Smoker     Smokeless tobacco: Never Used   Substance Use Topics     Alcohol use: No      Problem (# of Occurrences) Relation (Name,Age of Onset)    Cerebrovascular Disease (1) Paternal Grandfather    Diabetes (4) Maternal Grandfather, Maternal Grandmother, Paternal Grandmother, Paternal Grandfather    Hyperlipidemia (3) Maternal Grandfather, Maternal Grandmother, Paternal Grandmother    Uterine Cancer (1) Paternal Aunt            Current Outpatient Medications   Medication Sig     acetaminophen (TYLENOL) 500 MG tablet Take 500-1,000 mg by mouth every 6 hours as needed for mild pain     levothyroxine (SYNTHROID/LEVOTHROID) 50 MCG tablet Take 50 mcg by mouth daily      pantoprazole (PROTONIX) 20 MG EC tablet Take 40 mg by mouth daily     Prenatal Vit-Fe Fumarate-FA (PRENATAL MULTIVITAMIN W/IRON) 27-0.8 MG tablet Take 1 tablet by mouth daily     Vitamin D3 (CHOLECALCIFEROL) 125 MCG (5000 UT) tablet Take 1 tablet by mouth daily     No current facility-administered medications for this visit.      No Known Allergies    ROS:  12 point review of systems negative other than symptoms noted below or in the HPI.  No urinary frequency or dysuria, bladder or kidney problems      OBJECTIVE:     /76   Pulse 68   Ht 1.575 m (5' 2\")   Wt 91.6 kg (202 lb)   LMP 03/08/2021   BMI 36.95 kg/m    Body mass index is 36.95 kg/m .    Exam:  Constitutional:  Appearance: Well nourished, well developed alert, in no acute distress  Skin: General Inspection:  No rashes present, no lesions present, no areas " of discoloration.     In-Clinic Test Results:      ASSESSMENT/PLAN:                                                        Patient with 2 small areas of wound separation that are healthy and clean at this time.  We have asked her to stop using alcohol as that may be inhibiting healing.  She will continue with peroxide and report any redness or pus.          Nick Herrera MD  [facility]

## 2021-04-17 ENCOUNTER — HEALTH MAINTENANCE LETTER (OUTPATIENT)
Age: 41
End: 2021-04-17

## 2021-06-22 NOTE — PROGRESS NOTES
SUBJECTIVE:                                                   Amy Clemens is a 41 year old female who presents to clinic today for the following health issue(s):  Patient presents with:  Consult: had Laparoscopy, laparotomy, bilateral tubal-tubal reanastomosis on 3/18/21. Here to discuss how to move forward with trying for pregnancy come July.       HPI: The patient is seen at this time for a quick consultation after reanastomosis in March.  She states that her cycles are irregular.  We have asked her to not attempt pregnancy until she is 4 months out from surgery.  She will start ovulation testing for timing and the fact that she has had irregular cycles.      Patient's last menstrual period was 2021..     Patient is sexually active, .  Using condom for contraception.    reports that she has never smoked. She has never used smokeless tobacco.    STD testing offered?  Declined    Health maintenance updated:  no, due for mammogram.     Today's PHQ-2 Score:   PHQ-2 (  [employer]) 2021   Q1: Little interest or pleasure in doing things 0   Q2: Feeling down, depressed or hopeless 0   PHQ-2 Score 0     Today's PHQ-9 Score:   PHQ-9 SCORE 2020   PHQ-9 Total Score 0     Today's CULLEN-7 Score:   CULLEN-7 SCORE 2020   Total Score 0       Problem list and histories reviewed & adjusted, as indicated.  Additional history: as documented.    Patient Active Problem List   Diagnosis     Tubal occlusion     Past Surgical History:   Procedure Laterality Date     CHOLECYSTECTOMY       DILATION AND CURETTAGE, OPERATIVE HYSTEROSCOPY WITH MORCELLATOR, COMBINED N/A 2019    Procedure: hysteroscopy, dilation and curettage, polypectomy with myosure;  Surgeon: Floresita Zimmerman MD;  Location:  OR     DILATION AND CURETTAGE, OPERATIVE HYSTEROSCOPY WITH MORCELLATOR, COMBINED N/A 2020    Procedure: Hysteroscopy, dilation and curettage, endometrial polypectomy;  Surgeon: Floresita Zimmerman MD;  Location:  OR      DILATION AND CURETTAGE, OPERATIVE HYSTEROSCOPY WITH MORCELLATOR, COMBINED N/A 9/29/2020    Procedure: diagnostic HYSTEROSCOPY, POLYPECTOMY WITH MYOSURE MORCELLATOR, sharp DILATION AND CURETTAGE;  Surgeon: Arabella Felix MD;  Location: [facility] OR     LAPAROSCOPY DIAGNOSTIC (GYN) N/A 3/18/2021    Procedure: Laparoscopy,  laparotomy for tubal reanastomosis;  Surgeon: Nick Herrera MD;  Location:  OR     TUBAL LIGATION  2006    tubal ligation     TUBOPLASTY REANASTOMOSIS N/A 3/18/2021    Procedure: REANASTOMOSIS, FALLOPIAN TUBE;  Surgeon: Nick Herrera MD;  Location:  OR      Social History     Tobacco Use     Smoking status: Never Smoker     Smokeless tobacco: Never Used   Substance Use Topics     Alcohol use: No      Problem (# of Occurrences) Relation (Name,Age of Onset)    Cerebrovascular Disease (1) Paternal Grandfather    Diabetes (4) Maternal Grandfather, Maternal Grandmother, Paternal Grandmother, Paternal Grandfather    Hyperlipidemia (3) Maternal Grandfather, Maternal Grandmother, Paternal Grandmother    Uterine Cancer (1) Paternal Aunt            Current Outpatient Medications   Medication Sig     acetaminophen (TYLENOL) 500 MG tablet Take 500-1,000 mg by mouth every 6 hours as needed for mild pain     levothyroxine (SYNTHROID/LEVOTHROID) 50 MCG tablet Take 50 mcg by mouth daily      metFORMIN (GLUCOPHAGE-XR) 500 MG 24 hr tablet 500 mg     pantoprazole (PROTONIX) 20 MG EC tablet Take 40 mg by mouth daily     Prenatal Vit-Fe Fumarate-FA (PRENATAL MULTIVITAMIN W/IRON) 27-0.8 MG tablet Take 1 tablet by mouth daily     Vitamin D3 (CHOLECALCIFEROL) 125 MCG (5000 UT) tablet Take 1 tablet by mouth daily     No current facility-administered medications for this visit.      No Known Allergies    ROS:  12 point review of systems negative other than symptoms noted below or in the HPI.  No urinary frequency or dysuria, bladder or kidney problems      OBJECTIVE:     /72   Pulse 68   Ht 1.575 m  "(5' 2\")   Wt 92.5 kg (204 lb)   LMP 05/21/2021   BMI 37.31 kg/m    Body mass index is 37.31 kg/m .    Exam:  Constitutional:  Appearance: Well nourished, well developed alert, in no acute distress  Neurologic:  Mental Status:  Oriented X3.  Normal strength and tone, sensory exam grossly normal, mentation intact and speech normal.    Psychiatric:  Mentation appears normal and affect normal/bright.  No Pelvic Exam performed     In-Clinic Test Results:      ASSESSMENT/PLAN:                                                        ICD-10-CM    1. Tubal occlusion  N97.1    2. Anovulation  N97.0        41-year-old patient who is now 3 months out from her anastomosis.  She has 1 more month to abstain from attempting.  We have asked her to start doing ovulation testing as she states that her cycles are irregular.  We have given her realistic expectation of trying for at least 9 to 12 months of ovulatory cycles before x-ray should be performed.      Nick Herrera MD  [facility] [place]  "

## 2021-06-23 ENCOUNTER — PREP FOR PROCEDURE (OUTPATIENT)
Dept: OBGYN | Facility: CLINIC | Age: 41
End: 2021-06-23

## 2021-06-23 ENCOUNTER — OFFICE VISIT (OUTPATIENT)
Dept: OBGYN | Facility: CLINIC | Age: 41
End: 2021-06-23
Payer: COMMERCIAL

## 2021-06-23 VITALS
DIASTOLIC BLOOD PRESSURE: 72 MMHG | BODY MASS INDEX: 37.54 KG/M2 | WEIGHT: 204 LBS | HEART RATE: 68 BPM | SYSTOLIC BLOOD PRESSURE: 116 MMHG | HEIGHT: 62 IN

## 2021-06-23 DIAGNOSIS — N97.1 TUBAL OCCLUSION: Primary | ICD-10-CM

## 2021-06-23 DIAGNOSIS — N97.0 ANOVULATION: ICD-10-CM

## 2021-06-23 PROCEDURE — 99212 OFFICE O/P EST SF 10 MIN: CPT | Performed by: OBSTETRICS & GYNECOLOGY

## 2021-06-23 RX ORDER — METFORMIN HCL 500 MG
500 TABLET, EXTENDED RELEASE 24 HR ORAL
Status: ON HOLD | COMMUNITY
Start: 2021-04-06 | End: 2023-10-31

## 2021-06-23 ASSESSMENT — MIFFLIN-ST. JEOR: SCORE: 1538.59

## 2021-08-02 ENCOUNTER — HOSPITAL ENCOUNTER (EMERGENCY)
Facility: CLINIC | Age: 41
Discharge: LEFT BEFORE DISCHARGE | End: 2021-08-02
Admitting: EMERGENCY MEDICINE
Payer: COMMERCIAL

## 2021-08-02 VITALS
DIASTOLIC BLOOD PRESSURE: 100 MMHG | TEMPERATURE: 97.1 F | HEIGHT: 62 IN | HEART RATE: 68 BPM | SYSTOLIC BLOOD PRESSURE: 135 MMHG | WEIGHT: 190 LBS | OXYGEN SATURATION: 100 % | BODY MASS INDEX: 34.96 KG/M2 | RESPIRATION RATE: 16 BRPM

## 2021-08-02 LAB
ALBUMIN UR-MCNC: NEGATIVE MG/DL
APPEARANCE UR: CLEAR
BILIRUB UR QL STRIP: NEGATIVE
COLOR UR AUTO: NORMAL
GLUCOSE UR STRIP-MCNC: NEGATIVE MG/DL
HGB UR QL STRIP: NEGATIVE
KETONES UR STRIP-MCNC: NEGATIVE MG/DL
LEUKOCYTE ESTERASE UR QL STRIP: NEGATIVE
NITRATE UR QL: NEGATIVE
PH UR STRIP: 5.5 [PH] (ref 5–7)
SP GR UR STRIP: 1.02 (ref 1–1.03)
UROBILINOGEN UR STRIP-MCNC: NORMAL MG/DL

## 2021-08-02 PROCEDURE — 999N000104 HC STATISTIC NO CHARGE

## 2021-08-02 PROCEDURE — 81003 URINALYSIS AUTO W/O SCOPE: CPT | Performed by: EMERGENCY MEDICINE

## 2021-08-02 ASSESSMENT — MIFFLIN-ST. JEOR: SCORE: 1475.08

## 2021-08-03 NOTE — ED TRIAGE NOTES
Here for abdominal bloating started Thursday night, left lower abdominal pressure, nausea, pressure to have a bowel movement, but unable associated with rectal pain. Though, patient stated that she did had 3 bowel movements today. Had a tubal reversal about 5 months ago. Also stated possibly had a positive pregnancy test as the line was very faint on the test. ABCs intact.

## 2021-08-29 NOTE — ED AVS SNAPSHOT
[facility]    [address]    [place] [address]    Phone:  [phone]    Fax:  [phone]                                       Amy Clemens   MRN: [id number]    Department:  [facility]   Date of Visit:  11/22/2018           Patient Information     Date Of Birth          1980        Your diagnoses for this visit were:     Dysuria     Pelvic pain in female        You were seen by Gaby Farrell MD.      Follow-up Information     Follow up with Floresita Zimmerman MD On 12/3/2018.    Specialty:  OB/Gyn    Contact information:    [address] [address] [phone]          Follow up with Jemima Melgar.    Specialty:  Family Practice    Why:  As needed if you need to be seen before gynecology appointment    Contact information:    [address] [place]  [address]  [place] [address]  [phone]          Follow up with [facility].    Specialty:  EMERGENCY MEDICINE    Why:  If symptoms worsen    Contact information:    [address]  [facility] [address]  [phone]        Discharge Instructions       Urine culture is pending. You will be called with positive results only.  Gonorrhea and Chlamydia are pending. You will be called with positive results only.  Ultrasound is normal today, but keep appointment on the 3rd to discuss ongoing symptoms.  Try pyridium again for burning with urination.   Take Motrin or Tylenol for pain.  Return with severe pain, fever >101F, or ANY OTHER CONCERNS.    Discharge References/Attachments     DYSURIA, UNCERTAIN CAUSE (ADULT) (Czech)    PELVIC PAIN, UNKNOWN CAUSE (Czech)      24 Hour Appointment Hotline       To make an appointment at any [facility] clinic, call [phone] ([phone]). If you don't have a family doctor or clinic, we will help you find one. [facility] are  conveniently located to serve the needs of you and your family.             Review of your medicines      START taking        Dose / Directions Last dose taken    ibuprofen 600 MG tablet   Commonly known as:  ADVIL/MOTRIN   Dose:  600 mg   Quantity:  40 tablet        Take 1 tablet (600 mg) by mouth every 6 hours as needed for moderate pain   Refills:  0        phenazopyridine 200 MG tablet   Commonly known as:  PYRIDIUM   Dose:  200 mg   Quantity:  9 tablet        Take 1 tablet (200 mg) by mouth 3 times daily for 3 days   Refills:  0          Our records show that you are taking the medicines listed below. If these are incorrect, please call your family doctor or clinic.        Dose / Directions Last dose taken    ASPIRIN PO        Refills:  0        pantoprazole 40 MG EC tablet   Commonly known as:  PROTONIX   Dose:  40 mg        40 mg daily   Refills:  2                Prescriptions were sent or printed at these locations (2 Prescriptions)                   Other Prescriptions                Printed at Department/Unit printer (2 of 2)         ibuprofen (ADVIL/MOTRIN) 600 MG tablet               phenazopyridine (PYRIDIUM) 200 MG tablet                Procedures and tests performed during your visit     Procedure/Test Number of Times Performed    Chlamydia trachomatis PCR 1    HCG qualitative urine (UPT) 1    Neisseria gonorrhoea PCR 1    Peripheral IV catheter 1    UA with Microscopic 2    US Pelvic Complete w Transvaginal 1    Urine Culture 1    Wet prep 1      Orders Needing Specimen Collection     None      Pending Results     Date and Time Order Name Status Description    11/22/2018 0842 Urine Culture In process     11/22/2018 0645 US Pelvic Complete w Transvaginal Preliminary     11/22/2018 0645 Neisseria gonorrhoea PCR In process     11/22/2018 0645 Chlamydia trachomatis PCR In process             Pending Culture Results     Date and Time Order Name Status Description    11/22/2018 0842 Urine Culture In  process     11/22/2018 0645 Neisseria gonorrhoea PCR In process     11/22/2018 0645 Chlamydia trachomatis PCR In process             Pending Results Instructions     If you had any lab results that were not finalized at the time of your Discharge, you can call the ED Lab Result RN at [phone]. You will be contacted by this team for any positive Lab results or changes in treatment. The nurses are available 7 days a week from 10A to 6:30P.  You can leave a message 24 hours per day and they will return your call.        Test Results From Your Hospital Stay        11/22/2018  8:01 AM      Component Results     Component    Specimen Description    Vagina    Wet Prep    No Trichomonas seen    Wet Prep    No yeast seen    Wet Prep    Few  PMNs seen      Wet Prep    No clue cells seen         11/22/2018  7:57 AM         11/22/2018  7:57 AM         11/22/2018  7:50 AM      Narrative     PELVIC ULTRASOUND WITH ENDOVAGINAL TRANSDUCER IMAGING   11/22/2018  7:40 AM     HISTORY: Pelvic pain.     TECHNIQUE:  Endovaginal sonography was added to the transabdominal  exam to better evaluate the uterus and ovaries.    COMPARISON: Pelvic ultrasound 1/6/2017.    FINDINGS:   Endometrium: Endometrium is 6 mm thick.    Uterus: Measures 8.5 x 5.9 x 3.9 cm. Incidental nabothian cyst  measuring 1.5 cm.    Right ovary: Normal.    Left ovary: Normal.    Additional findings: None.        Impression     IMPRESSION:  No acute abnormality.         11/22/2018  7:14 AM      Component Results     Component Value Ref Range & Units Status    HCG Qual Urine Negative NEG^Negative Final    This test is for screening purposes.  Results should be interpreted along with   the clinical picture.  Confirmation testing is available if warranted by   ordering [id number], HCG Quantitative Pregnancy.           11/22/2018  7:15 AM      Component Results     Component Value Ref Range & Units Status    Color Urine Nicki  Final    Appearance Urine Clear  Final    Glucose  Urine Negative NEG^Negative mg/dL Final    Bilirubin Urine Negative NEG^Negative Final    Ketones Urine Negative NEG^Negative mg/dL Final    Specific Gravity Urine 1.013 1.003 - 1.035 Final    Blood Urine Large (A) NEG^Negative Final    pH Urine 5.0 5.0 - 7.0 pH Final    Protein Albumin Urine Negative NEG^Negative mg/dL Final    Urobilinogen mg/dL 0.0 0.0 - 2.0 mg/dL Final    Nitrite Urine Positive (A) NEG^Negative Final    Leukocyte Esterase Urine Trace (A) NEG^Negative Final    Source Midstream Urine  Final    WBC Urine 2 0 - 5 /HPF Final    RBC Urine 3 (H) 0 - 2 /HPF Final    Bacteria Urine Few (A) NEG^Negative /HPF Final    Squamous Epithelial /HPF Urine 6 (H) 0 - 1 /HPF Final    Mucous Urine Present (A) NEG^Negative /LPF Final         11/22/2018  8:40 AM      Component Results     Component Value Ref Range & Units Status    Color Urine Nicki  Final    Appearance Urine Clear  Final    Glucose Urine Negative NEG^Negative mg/dL Final    Bilirubin Urine Negative NEG^Negative Final    Ketones Urine Negative NEG^Negative mg/dL Final    Specific Gravity Urine 1.023 1.003 - 1.035 Final    Blood Urine Small (A) NEG^Negative Final    pH Urine 5.0 5.0 - 7.0 pH Final    Protein Albumin Urine Negative NEG^Negative mg/dL Final    Urobilinogen mg/dL 2.0 0.0 - 2.0 mg/dL Final    Nitrite Urine Positive (A) NEG^Negative Final    Leukocyte Esterase Urine Negative NEG^Negative Final    Source Catheterized Urine  Final    WBC Urine 2 0 - 5 /HPF Final    RBC Urine 9 (H) 0 - 2 /HPF Final    Squamous Epithelial /HPF Urine 1 0 - 1 /HPF Final    Mucous Urine Present (A) NEG^Negative /LPF Final         11/22/2018  8:48 AM                Clinical Quality Measure: Blood Pressure Screening     Your blood pressure was checked while you were in the emergency department today. The last reading we obtained was  BP: 109/63 . Please read the guidelines below about what these numbers mean and what you should do about them.  If your systolic blood  "pressure (the top number) is less than 120 and your diastolic blood pressure (the bottom number) is less than 80, then your blood pressure is normal. There is nothing more that you need to do about it.  If your systolic blood pressure (the top number) is 120-139 or your diastolic blood pressure (the bottom number) is 80-89, your blood pressure may be higher than it should be. You should have your blood pressure rechecked within a year by a primary care provider.  If your systolic blood pressure (the top number) is 140 or greater or your diastolic blood pressure (the bottom number) is 90 or greater, you may have high blood pressure. High blood pressure is treatable, but if left untreated over time it can put you at risk for heart attack, stroke, or kidney failure. You should have your blood pressure rechecked by a primary care provider within the next 4 weeks.  If your provider in the emergency department today gave you specific instructions to follow-up with your doctor or provider even sooner than that, you should follow that instruction and not wait for up to 4 weeks for your follow-up visit.        Thank you for choosing [facility]       Thank you for choosing [facility] for your care. Our goal is always to provide you with excellent care. Hearing back from our patients is one way we can continue to improve our services. Please take a few minutes to complete the written survey that you may receive in the mail after you visit with us. Thank you!        [email or web address] Information     [facility] lets you send messages to your doctor, view your test results, renew your prescriptions, schedule appointments and more. To sign up, go to [email or web address] . Click on \"Log in\" on the left side of the screen, which will take you to the Welcome page. Then click on \"Sign up Now\" on the right side of the page.     You will be asked to enter the access code listed below, as well as some personal information. Please follow the " directions to create your username and password.     Your access code is: [email or web address]  Expires: 2019 10:20 AM     Your access code will  in 90 days. If you need help or a new code, please call your [place] clinic or [phone].        Care EveryWhere ID     This is your Care EveryWhere ID. This could be used by other organizations to access your [place] medical records  [id number]        Equal Access to Services     YUMIKO ACEVEDO : Hadii aad ku hadasho Soomaali, waaxda luqadaha, qaybta kaalmada adeegyada, xiao gregory . So [facility] [phone].    ATENCIÓN: Si habla español, tiene a meade disposición servicios gratuitos de asistencia lingüística. Llame al [phone].    We comply with applicable federal civil rights laws and [place] laws. We do not discriminate on the basis of race, color, national origin, age, disability, sex, sexual orientation, or gender identity.            After Visit Summary       This is your record. Keep this with you and show to your community pharmacist(s) and doctor(s) at your next visit.                   show

## 2021-09-26 ENCOUNTER — HEALTH MAINTENANCE LETTER (OUTPATIENT)
Age: 41
End: 2021-09-26

## 2022-05-08 ENCOUNTER — HEALTH MAINTENANCE LETTER (OUTPATIENT)
Age: 42
End: 2022-05-08

## 2023-01-14 ENCOUNTER — HEALTH MAINTENANCE LETTER (OUTPATIENT)
Age: 43
End: 2023-01-14

## 2023-03-05 ENCOUNTER — APPOINTMENT (OUTPATIENT)
Dept: ULTRASOUND IMAGING | Facility: CLINIC | Age: 43
End: 2023-03-05
Attending: EMERGENCY MEDICINE
Payer: COMMERCIAL

## 2023-03-05 ENCOUNTER — HOSPITAL ENCOUNTER (EMERGENCY)
Facility: CLINIC | Age: 43
Discharge: HOME | End: 2023-03-05
Attending: EMERGENCY MEDICINE | Admitting: EMERGENCY MEDICINE
Payer: COMMERCIAL

## 2023-03-05 VITALS
SYSTOLIC BLOOD PRESSURE: 123 MMHG | HEART RATE: 68 BPM | DIASTOLIC BLOOD PRESSURE: 73 MMHG | OXYGEN SATURATION: 99 % | RESPIRATION RATE: 18 BRPM | TEMPERATURE: 98.2 F

## 2023-03-05 DIAGNOSIS — Z98.890 HISTORY OF IN VITRO FERTILIZATION: ICD-10-CM

## 2023-03-05 DIAGNOSIS — O36.80X0 PREGNANCY OF UNKNOWN ANATOMIC LOCATION: ICD-10-CM

## 2023-03-05 LAB
ANION GAP SERPL CALCULATED.3IONS-SCNC: 14 MMOL/L (ref 7–15)
BASOPHILS # BLD AUTO: 0 10E3/UL (ref 0–0.2)
BASOPHILS NFR BLD AUTO: 0 %
BUN SERPL-MCNC: 9.2 MG/DL (ref 6–20)
CALCIUM SERPL-MCNC: 9.1 MG/DL (ref 8.6–10)
CHLORIDE SERPL-SCNC: 101 MMOL/L (ref 98–107)
CREAT SERPL-MCNC: 0.54 MG/DL (ref 0.51–0.95)
DEPRECATED HCO3 PLAS-SCNC: 20 MMOL/L (ref 22–29)
EOSINOPHIL # BLD AUTO: 0.1 10E3/UL (ref 0–0.7)
EOSINOPHIL NFR BLD AUTO: 1 %
ERYTHROCYTE [DISTWIDTH] IN BLOOD BY AUTOMATED COUNT: 12.6 % (ref 10–15)
GFR SERPL CREATININE-BSD FRML MDRD: >90 ML/MIN/1.73M2
GLUCOSE SERPL-MCNC: 95 MG/DL (ref 70–99)
HCG INTACT+B SERPL-ACNC: 2138 MIU/ML
HCT VFR BLD AUTO: 41.5 % (ref 35–47)
HGB BLD-MCNC: 14 G/DL (ref 11.7–15.7)
IMM GRANULOCYTES # BLD: 0 10E3/UL
IMM GRANULOCYTES NFR BLD: 0 %
LYMPHOCYTES # BLD AUTO: 1.9 10E3/UL (ref 0.8–5.3)
LYMPHOCYTES NFR BLD AUTO: 22 %
MCH RBC QN AUTO: 30.2 PG (ref 26.5–33)
MCHC RBC AUTO-ENTMCNC: 33.7 G/DL (ref 31.5–36.5)
MCV RBC AUTO: 89 FL (ref 78–100)
MONOCYTES # BLD AUTO: 0.5 10E3/UL (ref 0–1.3)
MONOCYTES NFR BLD AUTO: 6 %
NEUTROPHILS # BLD AUTO: 6 10E3/UL (ref 1.6–8.3)
NEUTROPHILS NFR BLD AUTO: 71 %
NRBC # BLD AUTO: 0 10E3/UL
NRBC BLD AUTO-RTO: 0 /100
PLATELET # BLD AUTO: 305 10E3/UL (ref 150–450)
POTASSIUM SERPL-SCNC: 4.2 MMOL/L (ref 3.4–5.3)
RBC # BLD AUTO: 4.64 10E6/UL (ref 3.8–5.2)
SODIUM SERPL-SCNC: 135 MMOL/L (ref 136–145)
WBC # BLD AUTO: 8.6 10E3/UL (ref 4–11)

## 2023-03-05 PROCEDURE — 85025 COMPLETE CBC W/AUTO DIFF WBC: CPT | Performed by: EMERGENCY MEDICINE

## 2023-03-05 PROCEDURE — 99285 EMERGENCY DEPT VISIT HI MDM: CPT | Mod: 25

## 2023-03-05 PROCEDURE — 36415 COLL VENOUS BLD VENIPUNCTURE: CPT | Performed by: EMERGENCY MEDICINE

## 2023-03-05 PROCEDURE — 84702 CHORIONIC GONADOTROPIN TEST: CPT | Performed by: EMERGENCY MEDICINE

## 2023-03-05 PROCEDURE — 80048 BASIC METABOLIC PNL TOTAL CA: CPT | Performed by: EMERGENCY MEDICINE

## 2023-03-05 PROCEDURE — 76801 OB US < 14 WKS SINGLE FETUS: CPT

## 2023-03-05 ASSESSMENT — ENCOUNTER SYMPTOMS
VOMITING: 0
ABDOMINAL PAIN: 1
FEVER: 0

## 2023-03-05 ASSESSMENT — ACTIVITIES OF DAILY LIVING (ADL)
ADLS_ACUITY_SCORE: 33
ADLS_ACUITY_SCORE: 33

## 2023-03-05 NOTE — ED TRIAGE NOTES
Right groin pain radiating down leg since yesterday afternoon.  on the 3rd. Concerns for ectopic pregnancy

## 2023-03-05 NOTE — ED PROVIDER NOTES
Rapid Assessment Note    History:   Amy Clemens is a 43 year old female who presents with sharp RLQ pain.  Concern about ectopic pregnancy.  hCG level was about 900 on Friday.  She is been doing hormone replacement therapy and had a hormone implanted this past week.  Denies dysuria vaginal bleeding or discharge.    Exam:   General:  Alert, interactive  Cardiovascular:  Well perfused  Lungs:  No respiratory distress, no accessory muscle use  Abd: Mild lower Tenderness  Neuro:  Moving all 4 extremities  Skin:  Warm, dry  Psych:  Normal affect      Plan of Care:   I evaluated the patient and developed an initial plan of care. I discussed this plan and explained that I, or one of my partners, would be returning to complete the evaluation.     Discussed hCG level may not be elevated to allow confirmation of  IUP or ectopic, may need follow-up with OB this next week.    3/5/2023  [employer]    Portions of this medical record were completed by a scribe. UPON MY REVIEW AND AUTHENTICATION BY ELECTRONIC SIGNATURE, this confirms (a) I performed the applicable clinical services, and (b) the record is accurate.        Derick Burk MD  03/05/23 [id number]

## 2023-03-06 NOTE — DISCHARGE INSTRUCTIONS
At this time it is unclear if this is ectopic or just too early to see on ultrasound.  Please follow-up with your regular OB.  She will likely want to continue trending the beta and you can discuss when your next ultrasound should be.  Return immediately if you have worsening pain or any new symptoms including, but not limited to, fever, vomiting, or vaginal bleeding.  Continue all of your medications as prescribed by your [facility].

## 2023-03-06 NOTE — ED PROVIDER NOTES
History     Chief Complaint:  Pelvic Pain     The history is provided by the patient.      Amy Clemens is a 43 year old  female with a history of tubal occlusion who presents with pelvic pain. Amy had embyro transfer IVF on 2023 in [place]. She is receiving care here at [facility] and had an hCG of 912 2 days ago. Yesterday evening she noticed right lower abdominopelvic pain radiating down her right leg. The pain was severe enough to limit her ability to sleep and persisted into this morning. She took Tylenol which did not alleviate pain. She has not had this pain with prior pregnancies. She mentions history of hernia and some suspicion that her current pain may be related to this but is primarily concerned about ectopic. She denies vaginal bleeding, fever, or vomiting.     Independent Historian:   None - Patient Only    Review of External Notes: Reviewed available information from [facility].    ROS:  Review of Systems   Constitutional: Negative for fever.   Gastrointestinal: Positive for abdominal pain. Negative for vomiting.   Genitourinary: Positive for pelvic pain. Negative for vaginal bleeding.   Psychiatric/Behavioral: Positive for sleep disturbance.   All other systems reviewed and are negative.    Allergies:  No Known Allergies     Medications:    Synthroid/Levothroid  Glucophage-XR  Protonix  Tessalon    Past Medical History:    Endometriosis  GERD  Thyroid disease  Tubal occlusion    Past Surgical History:    Cholecystectomy  Dilation and curettage, operative hysteroscopy with morcellator x3  Tubal ligation  Tuboplasty reanastomosis    Family History:    Paternal grandfather - cerebrovascular disease, diabetes mellitus   Paternal grandmother - diabetes mellitus, hyperlipidemia  Maternal grandmother - diabetes mellitus, hyperlipidemia   Maternal grandfather - diabetes mellitus, hyperlipidemia   Paternal aunt - uterine cancer    Social History:  Presents to the ED  alone.  Has 3 children, the oldest being 16.  Has received some of her care, including embryo transfer, in [place].    Physical Exam     Patient Vitals for the past 24 hrs:   BP Temp Temp src Pulse Resp SpO2   23 1849 123/73 -- -- 68 18 99 %   23 1251 119/73 98.2  F (36.8  C) Temporal 73 20 99 %     Physical Exam  General: WD/WN; well appearing middle-aged  woman; cooperative  Head:  Atraumatic  Eyes:  Conjunctivae, lids, and sclerae are normal  Neck:  Supple; normal ROM  Resp:  No respiratory distress  GI:  Nondistended    MS:  Normal ROM  Skin:  Warm; non-diaphoretic; no pallor  Neuro:  Awake; A&Ox3  Psych: Normal mood and affect; normal speech  Vitals reviewed.    Emergency Department Course     Imaging:  US OB 1st Trimester W Transvaginal W Doppler   Final Result   IMPRESSION:    1.  No intrauterine gestation is identified. No endometrial thickening and no adnexal mass. Differential considerations include an early normal intrauterine pregnancy, recent spontaneous  and nonvisualized ectopic pregnancy. Recommend follow-up    with serial beta hCG levels and/or ultrasound.   2.  Normal ovaries without sonographic evidence of torsion.               Report per radiology    Laboratory:  Labs Ordered and Resulted from Time of ED Arrival to Time of ED Departure   HCG QUANTITATIVE PREGNANCY - Abnormal       Result Value    hCG Quantitative 2,138 (*)    BASIC METABOLIC PANEL - Abnormal    Sodium 135 (*)     Potassium 4.2      Chloride 101      Carbon Dioxide (CO2) 20 (*)     Anion Gap 14      Urea Nitrogen 9.2      Creatinine 0.54      Calcium 9.1      Glucose 95      GFR Estimate >90     CBC WITH PLATELETS AND DIFFERENTIAL    WBC Count 8.6      RBC Count 4.64      Hemoglobin 14.0      Hematocrit 41.5      MCV 89      MCH 30.2      MCHC 33.7      RDW 12.6      Platelet Count 305      % Neutrophils 71      % Lymphocytes 22      % Monocytes 6      % Eosinophils 1      % Basophils 0      % Immature  Granulocytes 0      NRBCs per 100 WBC 0      Absolute Neutrophils 6.0      Absolute Lymphocytes 1.9      Absolute Monocytes 0.5      Absolute Eosinophils 0.1      Absolute Basophils 0.0      Absolute Immature Granulocytes 0.0      Absolute NRBCs 0.0       Emergency Department Course & Assessments:  Assessments:   I obtained history and examined the patient as noted above.  She is comfortable with discharge and close obstetrics follow-up.    Independent Interpretation (X-rays, CTs, rhythm strip):  I reviewed the patient's ultrasound from today's presentation.    Social Determinants of Health affecting care:   Is receiving obstetric care here despite embryo transfer and [place].    Disposition:  The patient was discharged.     Impression & Plan    Medical Decision Making:  Amy is a 43 year old  s/p embryo transfer 23 presenting with right lower abdominal/pelvic pain that radiates down her right leg.  She has no other symptoms.  She has not had vaginal bleeding.  She is primarily concerned about ectopic pregnancy.  She reports a beta-hCG of 912 2 days ago.  She is well-appearing on exam.    At this time, ultrasound does not reveal intrauterine gestation with hCG of 2138.  Basic labs are otherwise noncontributory.  Amy has a very good understanding of what her work-up today means.  She understands this could simply be too early of a pregnancy to see (though I would expect some radiographic evidence of pregnancy with hCG >2K) or possibly an ectopic (though she had appropriate doubling of hCG in 48 hours suggesting normal pregnancy).  She agrees to follow-up with her regular OB for trending hCG and ultrasound.  She will continue her medications from [facility].  She also understands indications to return to the emergency department.  All of her questions were answered and she verbalized understanding.  Amenable to discharge.    Diagnosis:    ICD-10-CM    1. Pregnancy of unknown anatomic location  O36.80X0        2. History of in vitro fertilization  Z98.890         Scribe Disclosure:  I, Suresh Doss, am serving as a scribe at 6:53 PM on 3/5/2023 to document services personally performed by Gaby Farrell MD based on my observations and the provider's statements to me.   3/5/2023   Gaby Farrell MD Dixson, Kylie S, MD  04/03/23 1238

## 2023-03-28 ENCOUNTER — TRANSFERRED RECORDS (OUTPATIENT)
Dept: HEALTH INFORMATION MANAGEMENT | Facility: CLINIC | Age: 43
End: 2023-03-28
Payer: COMMERCIAL

## 2023-03-28 LAB
HEPATITIS B SURFACE ANTIGEN (EXTERNAL): NONREACTIVE
HIV1+2 AB SERPL QL IA: NONREACTIVE
RUBELLA ANTIBODY IGG (EXTERNAL): NORMAL
VDRL (SYPHILIS) (EXTERNAL): NONREACTIVE

## 2023-03-29 ENCOUNTER — MEDICAL CORRESPONDENCE (OUTPATIENT)
Dept: HEALTH INFORMATION MANAGEMENT | Facility: CLINIC | Age: 43
End: 2023-03-29
Payer: COMMERCIAL

## 2023-03-29 ENCOUNTER — TRANSCRIBE ORDERS (OUTPATIENT)
Dept: MATERNAL FETAL MEDICINE | Facility: CLINIC | Age: 43
End: 2023-03-29
Payer: COMMERCIAL

## 2023-03-29 DIAGNOSIS — O26.90 PREGNANCY RELATED CONDITION, ANTEPARTUM: Primary | ICD-10-CM

## 2023-03-31 ENCOUNTER — MEDICAL CORRESPONDENCE (OUTPATIENT)
Dept: HEALTH INFORMATION MANAGEMENT | Facility: CLINIC | Age: 43
End: 2023-03-31

## 2023-04-03 ASSESSMENT — ENCOUNTER SYMPTOMS: SLEEP DISTURBANCE: 1

## 2023-05-13 ENCOUNTER — APPOINTMENT (OUTPATIENT)
Dept: ULTRASOUND IMAGING | Facility: CLINIC | Age: 43
End: 2023-05-13
Attending: EMERGENCY MEDICINE
Payer: COMMERCIAL

## 2023-05-13 ENCOUNTER — HOSPITAL ENCOUNTER (EMERGENCY)
Facility: CLINIC | Age: 43
Discharge: HOME | End: 2023-05-14
Attending: EMERGENCY MEDICINE | Admitting: EMERGENCY MEDICINE
Payer: COMMERCIAL

## 2023-05-13 DIAGNOSIS — O46.90 VAGINAL BLEEDING IN PREGNANCY: ICD-10-CM

## 2023-05-13 DIAGNOSIS — R10.2 PELVIC CRAMPING: ICD-10-CM

## 2023-05-13 LAB
BASOPHILS # BLD AUTO: 0 10E3/UL (ref 0–0.2)
BASOPHILS NFR BLD AUTO: 0 %
EOSINOPHIL # BLD AUTO: 0.2 10E3/UL (ref 0–0.7)
EOSINOPHIL NFR BLD AUTO: 2 %
ERYTHROCYTE [DISTWIDTH] IN BLOOD BY AUTOMATED COUNT: 13 % (ref 10–15)
HCT VFR BLD AUTO: 38.7 % (ref 35–47)
HGB BLD-MCNC: 12.9 G/DL (ref 11.7–15.7)
IMM GRANULOCYTES # BLD: 0.1 10E3/UL
IMM GRANULOCYTES NFR BLD: 1 %
LYMPHOCYTES # BLD AUTO: 2.3 10E3/UL (ref 0.8–5.3)
LYMPHOCYTES NFR BLD AUTO: 22 %
MCH RBC QN AUTO: 30 PG (ref 26.5–33)
MCHC RBC AUTO-ENTMCNC: 33.3 G/DL (ref 31.5–36.5)
MCV RBC AUTO: 90 FL (ref 78–100)
MONOCYTES # BLD AUTO: 0.7 10E3/UL (ref 0–1.3)
MONOCYTES NFR BLD AUTO: 7 %
NEUTROPHILS # BLD AUTO: 7.1 10E3/UL (ref 1.6–8.3)
NEUTROPHILS NFR BLD AUTO: 68 %
NRBC # BLD AUTO: 0 10E3/UL
NRBC BLD AUTO-RTO: 0 /100
PLATELET # BLD AUTO: 260 10E3/UL (ref 150–450)
RBC # BLD AUTO: 4.3 10E6/UL (ref 3.8–5.2)
WBC # BLD AUTO: 10.3 10E3/UL (ref 4–11)

## 2023-05-13 PROCEDURE — 76810 OB US >/= 14 WKS ADDL FETUS: CPT

## 2023-05-13 PROCEDURE — 99284 EMERGENCY DEPT VISIT MOD MDM: CPT | Mod: 25

## 2023-05-13 PROCEDURE — 85004 AUTOMATED DIFF WBC COUNT: CPT | Performed by: EMERGENCY MEDICINE

## 2023-05-13 PROCEDURE — 36415 COLL VENOUS BLD VENIPUNCTURE: CPT | Performed by: EMERGENCY MEDICINE

## 2023-05-14 VITALS
RESPIRATION RATE: 16 BRPM | SYSTOLIC BLOOD PRESSURE: 127 MMHG | OXYGEN SATURATION: 96 % | BODY MASS INDEX: 36.07 KG/M2 | WEIGHT: 196 LBS | HEART RATE: 72 BPM | TEMPERATURE: 97.8 F | HEIGHT: 62 IN | DIASTOLIC BLOOD PRESSURE: 79 MMHG

## 2023-05-14 LAB
HOLD SPECIMEN: NORMAL

## 2023-05-14 ASSESSMENT — ACTIVITIES OF DAILY LIVING (ADL): ADLS_ACUITY_SCORE: 35

## 2023-05-14 ASSESSMENT — ENCOUNTER SYMPTOMS
HEMATURIA: 0
CONSTIPATION: 0
ABDOMINAL PAIN: 1
DIFFICULTY URINATING: 0
FREQUENCY: 0
NAUSEA: 0
DYSURIA: 0
DIARRHEA: 0

## 2023-05-14 NOTE — ED TRIAGE NOTES
Here for concern of vaginal bleeding started about 1 hour ago associated with pelvic cramping. Is about 14 weeks pregnant, is twins. Stated did have vaginal bleeding around 8 weeks and was evaluated by her OB at that time, was told baby was fine. ABCs intact.      Triage Assessment     Row Name 05/13/23 8203       Triage Assessment (Adult)    Airway WDL WDL       Respiratory WDL    Respiratory WDL WDL       Cardiac WDL    Cardiac WDL WDL

## 2023-05-14 NOTE — ED PROVIDER NOTES
History     Chief Complaint:  Vaginal Bleeding       HPI   Amy Clemens is a 43 year old female with a history of endometriosis who presents with vaginal bleeding which onset yesterday. She had some bleeding around 8 weeks, and she was found to have a subchorionic hemorrhage. The bleeding was very mild for the next four weeks, and around 12 weeks her subchorionic hemorrhage had receded. However, an hour ago she began having mild vaginal bleeding without any clotting. This is accompanied by some abdominal cramping. No abnormal micturition, abnormal bowel movements or nausea. She has been taking progesterone suppositories. Her blood type is O+      Independent Historian:    Patient.    Review of External Notes: Reviewed previous blood type is O+.  Also reviewed ultrasound from 2023.    ROS:  Review of Systems   Gastrointestinal: Positive for abdominal pain. Negative for constipation, diarrhea and nausea.   Genitourinary: Positive for vaginal bleeding. Negative for difficulty urinating, dysuria, enuresis, frequency, hematuria and urgency.   All other systems reviewed and are negative.      Allergies:  No Known Allergies     Medications:    Levothyroxine   Metformin   Pantoprazole   Prenatal Vit-Fe Fumarate-FA   Vitamin D3   Famotidine     Past Medical History:    Endometriosis   Gastroesophageal reflux disease   Thyroid disease   Tubal occlusion   Hypothyroidism  Vitamin D deficiency  Irregular menses  PMS    delivery  Right inguinal hernia  Obesity   TMJ dislocation     Past Surgical History:    Cholecystectomy   Dilation and curettage 3x  Tuboplasty reanastomosis   Tubal ligation  Hysteroscopic polypectomy    Social History:  Patient presents to the ED with family member.   PCP: Jemima Melgar     Physical Exam     Patient Vitals for the past 24 hrs:   BP Temp Temp src Pulse Resp SpO2 Height Weight   23 0135 127/79 -- -- 72 16 96 % -- --   23 0130 -- -- -- -- -- 98 % -- --   23  "0115 119/69 -- -- 68 -- 96 % -- --   05/14/23 0100 124/73 -- -- 76 -- 98 % -- --   05/14/23 0045 -- -- -- -- -- 93 % -- --   05/14/23 0030 120/68 -- -- -- -- 96 % -- --   05/13/23 2330 116/70 -- -- 75 -- 98 % -- --   05/13/23 2315 122/79 -- -- -- -- -- -- --   05/13/23 2307 126/76 97.8  F (36.6  C) Temporal 84 18 99 % 1.575 m (5' 2\") 88.9 kg (196 lb)        Physical Exam    HENT:  mmm  Eyes: periorbital tissue and sclera normal  Neck: supple  CV: ppi, regular   Resp: speaking in full sentences without any resp distress  Abd: Gravid appropriate for dates, abdomen is soft without significant tenderness, masses, organomegaly or guarding  Ext: peripheral edema present:  No  Skin: warm dry well perfused  Neuro: Alert, no gross motor or sensory deficits,  gait stable        Emergency Department Course     Imaging:  US OB >14 Weeks Multiple   Final Result   IMPRESSION:     1.  Twin diamniotic/dichorionic gestation.   2.  Twin A 15 weeks 4 days with EDC 10/31/2023.   3.  Twin B 15 weeks 2 days with EDC 11/02/2023.   4.  Concordant growth.   5.  No abnormalities evident.   6.  Suggest fetal survey at 20 weeks.        Report per radiology    Laboratory:  Labs Ordered and Resulted from Time of ED Arrival to Time of ED Departure   CBC WITH PLATELETS AND DIFFERENTIAL       Result Value    WBC Count 10.3      RBC Count 4.30      Hemoglobin 12.9      Hematocrit 38.7      MCV 90      MCH 30.0      MCHC 33.3      RDW 13.0      Platelet Count 260      % Neutrophils 68      % Lymphocytes 22      % Monocytes 7      % Eosinophils 2      % Basophils 0      % Immature Granulocytes 1      NRBCs per 100 WBC 0      Absolute Neutrophils 7.1      Absolute Lymphocytes 2.3      Absolute Monocytes 0.7      Absolute Eosinophils 0.2      Absolute Basophils 0.0      Absolute Immature Granulocytes 0.1      Absolute NRBCs 0.0            Emergency Department Course & Assessments:  Interventions:  Medications - No data to display     Independent " Interpretation (X-rays, CTs, rhythm strip):       Consultations/Discussion of Management or Tests:       Social Determinants of Health affecting care:  none    Assessments:  2320 I obtained the history and examined the patient as noted above.     Disposition:  The patient was discharged to home.     Impression & Plan      Medical Decision Makin-year-old female currently pregnant with twins here with lower abdominal cramping and vaginal bleeding.  No concerns related to the bleeding or significant secondary blood loss requiring transfusions.  She is known to be Rh+ no indication for RhoGAM.  Abdomen is benign on examination.  Ultrasound done that shows no subchorionic hemorrhage and fortunately shows 2 healthy embryos.  Safe and stable for discharge home at this point we will continue to follow clinically and asked her to reach out to her maternal-fetal medicine team.  She is comfortable and agreeable with that plan.      Diagnosis:    ICD-10-CM    1. Vaginal bleeding in pregnancy  O46.90       2. Pelvic cramping  R10.2            Discharge Medications:  Discharge Medication List as of 2023  1:31 AM             Scribe Disclosure:  I, Joshua Kjer, am serving as a scribe at 11:23 PM on 2023 to document services personally performed by Leon Perez MD based on my observations and the provider's statements to me.    2023   Leon Perez MD Walker, Jerome Richard, MD  23 0629

## 2023-05-30 ENCOUNTER — PRE VISIT (OUTPATIENT)
Dept: MATERNAL FETAL MEDICINE | Facility: CLINIC | Age: 43
End: 2023-05-30
Payer: COMMERCIAL

## 2023-06-06 ENCOUNTER — MEDICAL CORRESPONDENCE (OUTPATIENT)
Dept: HEALTH INFORMATION MANAGEMENT | Facility: CLINIC | Age: 43
End: 2023-06-06

## 2023-06-06 ENCOUNTER — LAB (OUTPATIENT)
Dept: LAB | Facility: CLINIC | Age: 43
End: 2023-06-06
Attending: ADVANCED PRACTICE MIDWIFE
Payer: COMMERCIAL

## 2023-06-06 ENCOUNTER — HOSPITAL ENCOUNTER (OUTPATIENT)
Dept: ULTRASOUND IMAGING | Facility: CLINIC | Age: 43
Discharge: HOME | End: 2023-06-06
Attending: ADVANCED PRACTICE MIDWIFE
Payer: COMMERCIAL

## 2023-06-06 ENCOUNTER — OFFICE VISIT (OUTPATIENT)
Dept: MATERNAL FETAL MEDICINE | Facility: CLINIC | Age: 43
End: 2023-06-06
Attending: ADVANCED PRACTICE MIDWIFE
Payer: COMMERCIAL

## 2023-06-06 DIAGNOSIS — O30.042 DICHORIONIC DIAMNIOTIC TWIN PREGNANCY IN SECOND TRIMESTER: Primary | ICD-10-CM

## 2023-06-06 DIAGNOSIS — O09.522 MULTIGRAVIDA OF ADVANCED MATERNAL AGE IN SECOND TRIMESTER: ICD-10-CM

## 2023-06-06 DIAGNOSIS — Z31.5 ENCOUNTER FOR PROCREATIVE GENETIC COUNSELING AND TESTING: ICD-10-CM

## 2023-06-06 DIAGNOSIS — O09.522 MULTIGRAVIDA OF ADVANCED MATERNAL AGE IN SECOND TRIMESTER: Primary | ICD-10-CM

## 2023-06-06 DIAGNOSIS — Z36.9 ENCOUNTER FOR ANTENATAL SCREENING OF MOTHER: ICD-10-CM

## 2023-06-06 DIAGNOSIS — O26.90 PREGNANCY RELATED CONDITION, ANTEPARTUM: ICD-10-CM

## 2023-06-06 DIAGNOSIS — O09.812 PREGNANCY RESULTING FROM IN VITRO FERTILIZATION IN SECOND TRIMESTER: ICD-10-CM

## 2023-06-06 PROCEDURE — 76812 OB US DETAILED ADDL FETUS: CPT

## 2023-06-06 PROCEDURE — 36415 COLL VENOUS BLD VENIPUNCTURE: CPT

## 2023-06-06 PROCEDURE — 76812 OB US DETAILED ADDL FETUS: CPT | Mod: 26 | Performed by: OBSTETRICS & GYNECOLOGY

## 2023-06-06 PROCEDURE — 76811 OB US DETAILED SNGL FETUS: CPT | Mod: 26 | Performed by: OBSTETRICS & GYNECOLOGY

## 2023-06-06 PROCEDURE — 96040 HC GENETIC COUNSELING, EACH 30 MINUTES: CPT

## 2023-06-06 NOTE — NURSING NOTE
Patient presents to M for L2. Positive fetal movement x2. Denies LOF, vaginal bleeding or cramping/contractions. SBAR given to MFHEMALATHA MD, see their note in Epic.

## 2023-06-06 NOTE — PROGRESS NOTES
"Please see \"Imaging\" tab under \"Chart Review\" for details of today's US at the [facility].    Brown David MD  Maternal-Fetal Medicine    " -3

## 2023-06-06 NOTE — PROGRESS NOTES
[facility]  Genetic Counseling Consult    Patient:  Amy Clemens YOB: 1980   Date of Service:  23   MRN: [id number]    Amy was seen at the [facility] for genetic consultation. The indication for genetic counseling is advanced maternal age. The patient was unaccompanied to this visit.     The session was conducted in English.      IMPRESSION/ PLAN   1. Amy had genetic screening earlier in this pregnancy. Their quad screen was screen negative (low risk) for screened conditions . It only screened for open neural tube defects and trisomy 21 as it is not validated to screen for trisomy 18 in twin pregnancies. She also had a failed Panorama NIPT that was sent by her primary OB because Panorama is not validated for twin pregnancies conceived using a donor egg.    2. During today's [facility] visit, Amy had a blood draw for NIPS through [facility]. The NIPS screens for trisomy 21, 18, and 13 and the patient opted to screen for presence of Y chromosome, but is aware it will only predict if at least one twin is male. Results are expected in 7-10 days. The patient will be called with results and if they do not answer they requested a detailed message with results on their voicemail.  Patient was informed that results will be available in [facility].     3. Amy had a level II comprehensive anatomy ultrasound today. Please see the ultrasound report for further details.    4. Further recommendations include a fetal echocardiogram with [facility] which is not yet scheduled. Amy is also scheduled for follow-up ultrasound with [facility] for 2023.    PREGNANCY HISTORY   /Parity:      Amy's pregnancy history is significant for:     A daughter born vaginally in  with a previous partner    A daughter born in  with a previous partner    A son born in  with a previous partner    A miscarriage at 7w in  for a  pregnancy conceived using IVF with her own egg with her current partner    A miscarriage at ~7w in March, 2021 for a pregnancy conceived using her own egg with her current partner    A chemical pregnancy conceived spontaneously in July, 2021 with her current partner    CURRENT PREGNANCY   Current Age: 43 year old   Age at Delivery: 43 year old  SHADY: 11/6/2023, by Other Basis                                   Gestational Age: 18w1d  This pregnancy is a dichorionic diamniotic twin gestation.     Twin pregnancies are described by the number of placentas (-chorionic) and amniotic sacs (-amniotic). In addition, twin pregnancies are also characterized by the number (mono- or di-) of zygotes (fertilized egg) the pregnancy developed from.    This pregnancy is a dichorionic diamniotic twin pregnancy which means the babies have separate placentas and separate amniotic sacs. Due to being dichorionic, there is a 20-30% chance the twins are monozygotic, or genetically identical and a 70-80% chance the twins are dizygotic, or NOT geneticallyidentical. If the twins arediscordant in sex (one male, one female) they are most likely dizygotic. If they are the same sex, the zygosity is unclear. There is a screening option called cell-free DNA that can determine zygosity with certain technology.     This pregnancy was conceived with in vitro fertilization (IVF). The following was discussed:     Embryo transfer date: 2/18/23    Number of transferred embryos: 2    Use of egg or sperm donor: egg donor and partner, Bonilla's (36y) sperm    Age of egg retrieval: 22    Frozen 5 day transfer    Preimplantation genetic testing (PGT) was not performed on the embryos     PGT-A is a screening test. Therefore, a normal PGT-A result significantly reduces but does not eliminate the possibility of aneuploidy. Invasive testing (such as amniocentesis) is recommended if the patient desires definitive information regarding aneuploidy in pregnancy. We  "discussed the limitation of NIPT following PGT-A and that pursuing multiple screening tests may result in conflicting information in which case the option of invasive testing would be reviewed again.    Fetal echocardiogram will be recommended and scheduled after the 18-20 week fetal anatomy ultrasound    The average pregnancy has a 0.5-1.0% chance of a congenital heart defect. However, studies suggest an increased chance for a heart defect for IVF pregnancies, with estimates ranging from 1-3.3%. Fetal echocardiograms are typically performed at 20 to 24 weeks gestation.    We spent time discussing the psychosocial concerns unique to IVF pregnancies and the anxiety/grief of losing three pregnancies through the IVF process. Amy said she has been coping well, and came to a place of acceptance prior to this pregnancy. I validated the grief and anxiety that goes along with losing pregnancies and failed IVF attempts.    MEDICAL HISTORY   Amy s reported medical history is not expected to impact pregnancy management or risks to fetal development.       FAMILY HISTORY   A three-generation pedigree was obtained today and is scanned under the \"Media\" tab in Epic. The family history was reported by Amy.    The following significant findings were reported today:   Amy reports that her daughter (21y) had an osteosarcoma in her left arm diagnosed at 9-years-old. She denies a family history of other cancers, including leukemia, breast, or brain cancer. We discussed the family history of cancer briefly. Cancer most often occurs by chance; however, some families seem to develop cancer more frequently than expected. Everyone has a risk to develop cancer, but individuals may be at an increased risk to develop cancer based on their family history. We discussed that sarcomas can be associated with inherited cancer predisposition syndromes, like Li Fraumeni Syndrome. Genetic counseling is available for cancer syndromes. Cancer " "family history, even without genetic testing, can change cancer screening recommendations for family members and aid in insurance coverage for access to them as well. The most informative individuals to complete cancer genetic counseling and genetic testing are those with a personal history of cancer or those closely related to the affected individuals. This may be her daughter. Amy reports that her daughter also struggles with her mental health and sees a therapist regularly, and is currently pregnant. She asked about her daughter being seen by M. I said this would be coordinated through a referral from her daughter's OB. If the family wants more information about cancer genetic counseling they can contact the [facility] Cancer Risk Management Program ([phone]). Physicians can also make referrals at [email or web address] or, if within the [facility] system, through [facility] referral for \"Cancer Risk Mgmt/Cancer Genetic Counseling\"    Bonilla (36y), Amy's partner, is healthy and has no children.    Bonilla's uncle  from heart disease and had a physical disability involving his back. Amy did not have additional details. Without additional details recurrence risk is not able to be described, but this can be revisited if Amy learns more.    Otherwise, the reported family history is unremarkable for multiple miscarriages, stillbirths, birth defects, intellectual disabilities, known genetic conditions, and consanguinity.       CARRIER SCREENING   Expanded carrier screening is available to screen for autosomal recessive conditions and X-linked conditions in a large list of genes. Autosomal recessive conditions happen when a mutation has been inherited from the egg and sperm and include conditions like cystic fibrosis, thalassemia, hearing loss, spinal muscular atrophy, and more. X-linked conditions happen when a mutation has been inherited from the egg " and include conditions like fragile X syndrome.  screening was also reviewed. About [place] Screening    The patient has declined the carrier screening options today. They are aware the option will remain, and they can contact us if they would like to pursue screening.We discussed the limitations of carrier screening given that an egg donor was used for this pregnancy, and that some IVF centers perform carrier screening on all donors. Amy's IVF was performed in [place] and we do not have records, so it unclear if the donor had carrier screening.       RISK ASSESSMENT FOR CHROMOSOME CONDITIONS   We explained that the risk for fetal chromosome abnormalities increases with maternal age. We discussed specific features of common chromosome abnormalities, including Down syndrome, trisomy 13, trisomy 18, and sex chromosome trisomies. The age-related risk is based off the age of egg donor which is why 23 is used.      At age 23 at midtrimester, the risk to have a baby with Down syndrome is 1 in 1114.    At age 22 at midtrimester, the risk to have a baby with any chromosome abnormality is 1 in 557.     Amy had genetic screening earlier in this pregnancy. Their quad screen was screen negative (low risk) for screened conditions     Quad screen results     Maternal plasma AFP, hCG, estriol, and inhibin measurement between 15-24 weeks gestation    Provides risk assessment for trisomy 21, trisomy 18, and neural tube defects    Amy had a quad screen earlier in pregnancy; we reviewed the results today, which were Screen Negative    Chemical values were as follows:    AFP 2.02 MoM     hCG 1.34 MoM     Estriol 2.04 MoM     AMY 1.53 MoM    This screen gave the following risk assessments:    Down syndrome risk= <1:5000    Trisomy 18 risk= can not be calculated for twins    Open neural tube defects risk= low    The patient's OB also ordered Panorama NIPT which returned with no results as this platform of NIPT is not  validated for twin pregnancies conceived using a donor egg. This is due to the differences between NGS- and SNP-based NIPS platforms. NGS-based platforms involve amplifying and counting the cell-free DNA fragments using millions of sequence tags per sample. Targeted regions are amplified and DNA from the pregnant person and from the placenta are sequenced simultaneously. In general, they cannot strictly differentiate between placental and maternal cell-free DNA, though the fragment sizes are often smaller for the placental DNA. The lab then analyzes the amplified, sequenced regions to determine if there is an over- or under-representation of chromosome material compared to the expected amount. SNP-based platforms sequence single nucleotide polymorphisms, which are regions of DNA that are expected to commonly differ between individuals. The placenta is expected to have some SNPs that differ from the pregnant person, and are therefore unique, as it will have SNPs from the other biological parent. The lab then measures the dosage/rate of SNPs and compares them to the expected dosage.    GENETIC TESTING OPTIONS   Genetic testing during a pregnancy includes screening and diagnostic procedures.      Screening tests are non-invasive which means no risk to the pregnancy and includes ultrasounds and blood work. The benefits and limitations of screening were reviewed. Screening tests provide a risk assessment (chance) specific to the pregnancy for certain fetal chromosome abnormalities but cannot definitively diagnose or exclude a fetal chromosome abnormality. Follow-up genetic counseling and consideration of diagnostic testing is recommended with any abnormal screening result. Diagnostic testing during a pregnancy is more certain and can test for more conditions. However, the tests do have a risk of miscarriage that requires careful consideration. These tests can detect fetal chromosome abnormalities with greater than 99%  certainty. Results can be compromised by maternal cell contamination or mosaicism and are limited by the resolution of current genetic testing technology.     There is no screening or diagnostic test that detects all forms of birth defects or intellectual disability.     We discussed the following screening options:   Non-invasive prenatal testing (NIPT)    Also called cell-free DNA screening because it detects chromosomes from the placenta in the pregnant person's blood    Can be done any time after 10 weeks gestation    Screens for trisomy 21, trisomy 18, trisomy 13, and sex chromosome aneuploidies    Cannot screen for open neural tube defects, maternal serum AFP after 15 weeks is recommended      We discussed the following ultrasound options:  Comprehensive level II ultrasound (Fetal Anatomy Ultrasound)    Ultrasound done between 18-20 weeks gestation    Screens for major birth defects and markers for aneuploidy (like trisomy 21 and trisomy 18)    Includes looking at the fetus/baby's growth, heart, organs (stomach, kidneys), placenta, and amniotic fluid    Fetal Echocardiogram    Ultrasound done between 22-24 weeks gestation    Screen for heart defects    Recommended if there are concerns about the heart or other indications like IVF pregnancy or maternal diabetes      We discussed the following diagnostic options:   Amniocentesis    Invasive diagnostic procedure done after 15 weeks gestation    The procedure collects a small sample of amniotic fluid for the purpose of chromosomal testing and/or other genetic testing    Diagnostic result; more than 99% sensitivity for fetal chromosome abnormalities    Testing for AFP in the amniotic fluid can test for open neural tube defects    Amy said she would want to have an amniocentesis if something abnormal was found on ultrasound.    It was a pleasure to be involved with Amy s care. Face-to-face time of the meeting was 45 minutes.    Bianca Sharif, YOLI, MS,  [facility]  Certified and [place] Licensed Genetic Counselor  [facility]  Maternal Fetal Medicine  Office: [phone]  [facility]: [phone]   Fax: [phone]  [facility]

## 2023-06-13 ENCOUNTER — TRANSCRIBE ORDERS (OUTPATIENT)
Dept: OTHER | Age: 43
End: 2023-06-13

## 2023-06-13 DIAGNOSIS — O09.299 HISTORY OF GESTATIONAL DIABETES IN PRIOR PREGNANCY, CURRENTLY PREGNANT: Primary | ICD-10-CM

## 2023-06-13 DIAGNOSIS — Z86.32 HISTORY OF GESTATIONAL DIABETES IN PRIOR PREGNANCY, CURRENTLY PREGNANT: Primary | ICD-10-CM

## 2023-06-14 ENCOUNTER — TELEPHONE (OUTPATIENT)
Dept: MATERNAL FETAL MEDICINE | Facility: CLINIC | Age: 43
End: 2023-06-14
Payer: COMMERCIAL

## 2023-06-14 LAB — SCANNED LAB RESULT: NORMAL

## 2023-06-21 ENCOUNTER — TRANSFERRED RECORDS (OUTPATIENT)
Dept: HEALTH INFORMATION MANAGEMENT | Facility: CLINIC | Age: 43
End: 2023-06-21

## 2023-06-27 ENCOUNTER — OFFICE VISIT (OUTPATIENT)
Dept: MATERNAL FETAL MEDICINE | Facility: CLINIC | Age: 43
End: 2023-06-27
Attending: OBSTETRICS & GYNECOLOGY
Payer: COMMERCIAL

## 2023-06-27 ENCOUNTER — HOSPITAL ENCOUNTER (OUTPATIENT)
Dept: ULTRASOUND IMAGING | Facility: CLINIC | Age: 43
Discharge: HOME | End: 2023-06-27
Attending: OBSTETRICS & GYNECOLOGY
Payer: COMMERCIAL

## 2023-06-27 DIAGNOSIS — O09.812 PREGNANCY RESULTING FROM IN VITRO FERTILIZATION IN SECOND TRIMESTER: ICD-10-CM

## 2023-06-27 DIAGNOSIS — O30.042 DICHORIONIC DIAMNIOTIC TWIN PREGNANCY IN SECOND TRIMESTER: ICD-10-CM

## 2023-06-27 DIAGNOSIS — O30.042 DICHORIONIC DIAMNIOTIC TWIN PREGNANCY IN SECOND TRIMESTER: Primary | ICD-10-CM

## 2023-06-27 PROCEDURE — 76816 OB US FOLLOW-UP PER FETUS: CPT | Mod: 26 | Performed by: OBSTETRICS & GYNECOLOGY

## 2023-06-27 PROCEDURE — 76816 OB US FOLLOW-UP PER FETUS: CPT

## 2023-06-27 NOTE — NURSING NOTE
Amy Clemens is a  at 21w1d who presents to [facility] for follow-up ultrasound for Di-Di twins. Pt reports positive fetal movement. SBAR given to Dr. David, see note in Epic.     Treasure Mckay RN

## 2023-06-27 NOTE — PROGRESS NOTES
"Please see \"Imaging\" tab under \"Chart Review\" for details of today's US at the [facility].    Brown David MD  Maternal-Fetal Medicine    "

## 2023-07-12 ENCOUNTER — OFFICE VISIT (OUTPATIENT)
Dept: PEDIATRIC CARDIOLOGY | Facility: CLINIC | Age: 43
End: 2023-07-12
Payer: COMMERCIAL

## 2023-07-12 ENCOUNTER — HOSPITAL ENCOUNTER (OUTPATIENT)
Dept: CARDIOLOGY | Facility: CLINIC | Age: 43
Discharge: HOME | End: 2023-07-12
Attending: OBSTETRICS & GYNECOLOGY
Payer: COMMERCIAL

## 2023-07-12 DIAGNOSIS — O09.812 PREGNANCY RESULTING FROM IN VITRO FERTILIZATION IN SECOND TRIMESTER: ICD-10-CM

## 2023-07-12 DIAGNOSIS — O30.042 DICHORIONIC DIAMNIOTIC TWIN PREGNANCY IN SECOND TRIMESTER: ICD-10-CM

## 2023-07-12 DIAGNOSIS — O35.BXX1 ANOMALY OF HEART OF FETUS AFFECTING PREGNANCY, ANTEPARTUM, FETUS 1 OF MULTIPLE GESTATION: Primary | ICD-10-CM

## 2023-07-12 DIAGNOSIS — O35.BXX2 ANOMALY OF HEART OF FETUS AFFECTING PREGNANCY, ANTEPARTUM, FETUS 2 OF MULTIPLE GESTATION: ICD-10-CM

## 2023-07-12 PROCEDURE — 93325 DOPPLER ECHO COLOR FLOW MAPG: CPT

## 2023-07-12 PROCEDURE — 76827 ECHO EXAM OF FETAL HEART: CPT | Mod: 26 | Performed by: PEDIATRICS

## 2023-07-12 PROCEDURE — 93325 DOPPLER ECHO COLOR FLOW MAPG: CPT | Mod: 26 | Performed by: PEDIATRICS

## 2023-07-12 PROCEDURE — 76825 ECHO EXAM OF FETAL HEART: CPT | Mod: 26 | Performed by: PEDIATRICS

## 2023-07-12 PROCEDURE — 99204 OFFICE O/P NEW MOD 45 MIN: CPT | Mod: 25 | Performed by: PEDIATRICS

## 2023-07-12 NOTE — PROGRESS NOTES
[facility]   Heart Center Fetal Consult Note    Patient:  Amy Clemens MRN:  [id number]   YOB: 1980 Age:  43 year old   Date of Visit:  2023 PCP:  Jemima Melgar     Dear Doctor:    I had the pleasure of seeing Amy Clemens at the [facility] on 2023 in fetal cardiology consultation for fetal echocardiogram results. She presented today accompanied by her daughter. As you know, she is a 43 year old  at 23w2d who presented for fetal echocardiogram today because of Di-Di twins by IVF.     I performed and interpreted the fetal echocardiogram today, which was limited by fetal position and poor windows. The study demonstrated:    Fetus 1  Likely normal fetal echocardiogram. Normal fetal intracardiac connections. Normal right and left ventricular size and function. Fetal heart rate is regular at 134 bpm. No hydrops.    Fetus 2  Likely normal fetal echocardiogram. Normal fetal intracardiac connections. Normal right and left ventricular size and function. Fetal heart rate is regular at 148 bpm. No hydrops.       I reviewed today's findings with Amy Clemens. She is aware that the study was within normal limits but had limitations.. There were no major cardiac abnormalities. She is aware of the general limitations of fetal echocardiography. No additional fetal echocardiograms are recommended. No  cardiac follow-up is required.     Thank you for allowing me to participate in Amy's care. Please do not hesitate to contact me with questions or concerns.    This visit was separate from the performance and interpretation of the ultrasound. The majority of the time (>50%) was spent in counseling and coordination of care. I spent approximately 45 minutes in face-to-face time reviewing the above considerations.    Mata Ramirez M.D.  Pediatric Cardiology  [address], Academic Office  [address], [facility] [address]  Phone [phone]  Fax [phone]     Admission

## 2023-07-12 NOTE — LETTER
2023      RE: Amy Clemens  [address] [facility] [address]     Dear Colleague,    Thank you for the opportunity to participate in the care of your patient, Amy Clemens, at the [facility] at [facility]. Please see a copy of my visit note below.    [facility]   Heart Center Fetal Consult Note    Patient:  Amy Clemens MRN:  [id number]   YOB: 1980 Age:  43 year old   Date of Visit:  2023 PCP:  Jemima Melgar     Dear Doctor:    I had the pleasure of seeing Amy Clemens at the [facility] on 2023 in fetal cardiology consultation for fetal echocardiogram results. She presented today accompanied by her daughter. As you know, she is a 43 year old  at 23w2d who presented for fetal echocardiogram today because of Di-Di twins by IVF.     I performed and interpreted the fetal echocardiogram today, which was limited by fetal position and poor windows. The study demonstrated:    Fetus 1  Likely normal fetal echocardiogram. Normal fetal intracardiac connections. Normal right and left ventricular size and function. Fetal heart rate is regular at 134 bpm. No hydrops.    Fetus 2  Likely normal fetal echocardiogram. Normal fetal intracardiac connections. Normal right and left ventricular size and function. Fetal heart rate is regular at 148 bpm. No hydrops.       I reviewed today's findings with Amy Clemens. She is aware that the study was within normal limits but had limitations.. There were no major cardiac abnormalities. She is aware of the general limitations of fetal echocardiography. No additional fetal echocardiograms are recommended. No  cardiac follow-up is required.     Thank you for allowing me to participate in Amy's care. Please do not hesitate to contact me with questions or concerns.    This visit was separate from the  performance and interpretation of the ultrasound. The majority of the time (>50%) was spent in counseling and coordination of care. I spent approximately 45 minutes in face-to-face time reviewing the above considerations.    Mata Ramirez M.D.  Pediatric Cardiology  [facility] [address] [place] [address]  Phone [phone]  Fax [phone]

## 2023-08-30 ENCOUNTER — TRANSFERRED RECORDS (OUTPATIENT)
Dept: HEALTH INFORMATION MANAGEMENT | Facility: CLINIC | Age: 43
End: 2023-08-30
Payer: COMMERCIAL

## 2023-08-30 ENCOUNTER — MEDICAL CORRESPONDENCE (OUTPATIENT)
Dept: HEALTH INFORMATION MANAGEMENT | Facility: CLINIC | Age: 43
End: 2023-08-30
Payer: COMMERCIAL

## 2023-08-31 ENCOUNTER — TRANSCRIBE ORDERS (OUTPATIENT)
Dept: ULTRASOUND IMAGING | Facility: CLINIC | Age: 43
End: 2023-08-31
Payer: COMMERCIAL

## 2023-08-31 DIAGNOSIS — O26.90 PREGNANCY RELATED CONDITION, ANTEPARTUM: Primary | ICD-10-CM

## 2023-09-01 ENCOUNTER — DOCUMENTATION ONLY (OUTPATIENT)
Dept: MATERNAL FETAL MEDICINE | Facility: CLINIC | Age: 43
End: 2023-09-01
Payer: COMMERCIAL

## 2023-09-01 NOTE — PROGRESS NOTES
Called [facility] to clarify order sent for upcoming appointment on Tuesday 9/5/23. Referral states mono/di twins, Jenny-care coordinator at [facility] reviewed case with Dr. Peterson who advised it is di/di twins as was shown on previous [facility] records. Inquired if there is a new concern, order states patient thought she needed to be seen by MFM. Per Jenny no new concerns after reviewing with Dr. Peterson. Reviewed most recent ultrasound report from [facility] and also reviewed with Dr. David who advised that patient can continue to be scanned by [facility] and refer to [facility] if needed. Attempted to call patient, left message to call back to [facility]. Jenny updated.

## 2023-09-05 ENCOUNTER — OFFICE VISIT (OUTPATIENT)
Dept: MATERNAL FETAL MEDICINE | Facility: CLINIC | Age: 43
End: 2023-09-05
Attending: OBSTETRICS & GYNECOLOGY
Payer: COMMERCIAL

## 2023-09-05 ENCOUNTER — HOSPITAL ENCOUNTER (OUTPATIENT)
Dept: ULTRASOUND IMAGING | Facility: CLINIC | Age: 43
Discharge: HOME | End: 2023-09-05
Attending: OBSTETRICS & GYNECOLOGY
Payer: COMMERCIAL

## 2023-09-05 DIAGNOSIS — O26.90 PREGNANCY RELATED CONDITION, ANTEPARTUM: ICD-10-CM

## 2023-09-05 DIAGNOSIS — O30.043 DICHORIONIC DIAMNIOTIC TWIN PREGNANCY IN THIRD TRIMESTER: Primary | ICD-10-CM

## 2023-09-05 PROCEDURE — 76816 OB US FOLLOW-UP PER FETUS: CPT | Mod: 26 | Performed by: OBSTETRICS & GYNECOLOGY

## 2023-09-05 PROCEDURE — 76816 OB US FOLLOW-UP PER FETUS: CPT

## 2023-09-05 NOTE — NURSING NOTE
Amy presents to [facility] for follow up ultrasound. Patient reports fetal movemen, denies contractions, leaking of fluid, or bleeding.  Reports blood sugar values are well controlled. SBAR to MD, see imaging report.    
Male

## 2023-09-05 NOTE — PROGRESS NOTES
"Please see \"Imaging\" tab under \"Chart Review\" for details of today's US at the [facility].    Brown David MD  Maternal-Fetal Medicine    "

## 2023-09-26 ENCOUNTER — OFFICE VISIT (OUTPATIENT)
Dept: MATERNAL FETAL MEDICINE | Facility: CLINIC | Age: 43
End: 2023-09-26
Attending: OBSTETRICS & GYNECOLOGY
Payer: COMMERCIAL

## 2023-09-26 ENCOUNTER — HOSPITAL ENCOUNTER (OUTPATIENT)
Dept: ULTRASOUND IMAGING | Facility: CLINIC | Age: 43
Discharge: HOME | End: 2023-09-26
Attending: OBSTETRICS & GYNECOLOGY
Payer: COMMERCIAL

## 2023-09-26 VITALS — HEART RATE: 70 BPM | DIASTOLIC BLOOD PRESSURE: 83 MMHG | SYSTOLIC BLOOD PRESSURE: 125 MMHG

## 2023-09-26 DIAGNOSIS — O36.5990 FETAL GROWTH RESTRICTION ANTEPARTUM: Primary | ICD-10-CM

## 2023-09-26 DIAGNOSIS — O30.043 DICHORIONIC DIAMNIOTIC TWIN PREGNANCY IN THIRD TRIMESTER: ICD-10-CM

## 2023-09-26 PROCEDURE — 76820 UMBILICAL ARTERY ECHO: CPT | Mod: 59

## 2023-09-26 PROCEDURE — 99214 OFFICE O/P EST MOD 30 MIN: CPT | Mod: 25 | Performed by: OBSTETRICS & GYNECOLOGY

## 2023-09-26 PROCEDURE — 76816 OB US FOLLOW-UP PER FETUS: CPT | Mod: 26 | Performed by: OBSTETRICS & GYNECOLOGY

## 2023-09-26 PROCEDURE — 59025 FETAL NON-STRESS TEST: CPT | Mod: 26 | Performed by: OBSTETRICS & GYNECOLOGY

## 2023-09-26 PROCEDURE — 59025 FETAL NON-STRESS TEST: CPT

## 2023-09-26 PROCEDURE — 59025 FETAL NON-STRESS TEST: CPT | Mod: 59

## 2023-09-26 PROCEDURE — 76820 UMBILICAL ARTERY ECHO: CPT | Mod: 26 | Performed by: OBSTETRICS & GYNECOLOGY

## 2023-09-26 PROCEDURE — G0463 HOSPITAL OUTPT CLINIC VISIT: HCPCS | Mod: 25 | Performed by: OBSTETRICS & GYNECOLOGY

## 2023-09-26 PROCEDURE — 76816 OB US FOLLOW-UP PER FETUS: CPT | Mod: 59

## 2023-09-26 NOTE — PROGRESS NOTES
"Please see \"Imaging\" tab under \"Chart Review\" for details of today's visit.    Naomi Yousif MD PhD  Maternal Fetal Medicine     "

## 2023-09-26 NOTE — NURSING NOTE
Amy presents to [facility] for follow up ultrasound. Patient reports good fetal movement, denies contractions, leaking of fluid, or bleeding.     NST Performed due to FGR.   reviewed efm tracing. See NST/BPP Doc Flowsheet tab.

## 2023-10-03 ENCOUNTER — HOSPITAL ENCOUNTER (OUTPATIENT)
Dept: ULTRASOUND IMAGING | Facility: CLINIC | Age: 43
Discharge: HOME | End: 2023-10-03
Attending: OBSTETRICS & GYNECOLOGY
Payer: COMMERCIAL

## 2023-10-03 ENCOUNTER — OFFICE VISIT (OUTPATIENT)
Dept: MATERNAL FETAL MEDICINE | Facility: CLINIC | Age: 43
End: 2023-10-03
Attending: OBSTETRICS & GYNECOLOGY
Payer: COMMERCIAL

## 2023-10-03 DIAGNOSIS — O36.5990 FETAL GROWTH RESTRICTION ANTEPARTUM: Primary | ICD-10-CM

## 2023-10-03 DIAGNOSIS — O36.5990 FETAL GROWTH RESTRICTION ANTEPARTUM: ICD-10-CM

## 2023-10-03 PROCEDURE — 76820 UMBILICAL ARTERY ECHO: CPT | Mod: 26 | Performed by: OBSTETRICS & GYNECOLOGY

## 2023-10-03 PROCEDURE — 76820 UMBILICAL ARTERY ECHO: CPT | Mod: 59

## 2023-10-03 PROCEDURE — 59025 FETAL NON-STRESS TEST: CPT | Mod: 59

## 2023-10-03 PROCEDURE — 76815 OB US LIMITED FETUS(S): CPT | Mod: 26 | Performed by: OBSTETRICS & GYNECOLOGY

## 2023-10-03 PROCEDURE — 59025 FETAL NON-STRESS TEST: CPT | Mod: 26 | Performed by: OBSTETRICS & GYNECOLOGY

## 2023-10-03 PROCEDURE — 76815 OB US LIMITED FETUS(S): CPT

## 2023-10-03 PROCEDURE — 59025 FETAL NON-STRESS TEST: CPT

## 2023-10-03 NOTE — NURSING NOTE
Patient reports good fetal movement, denies contractions, leaking of fluid, or bleeding.  Reports blood sugar values are well controlled.        NST Performed due to FGR twin A.   reviewed efm tracing. See NST/BPP Doc Flowsheet tab.

## 2023-10-03 NOTE — PROGRESS NOTES
"Please see \"Imaging\" tab under \"Chart Review\" for details of today's US at the [facility].    Brown David MD  Maternal-Fetal Medicine    "

## 2023-10-13 ENCOUNTER — TELEPHONE (OUTPATIENT)
Dept: ENDOCRINOLOGY | Facility: CLINIC | Age: 43
End: 2023-10-13
Payer: COMMERCIAL

## 2023-10-13 NOTE — TELEPHONE ENCOUNTER
Called patient and left voicemail. Patient has appointment on  10/17/23 . Need to collect 14 days of blood sugar readings if patient is using meter, or if patient is using CGM, need to get patients device uploaded to retrieve report for provider to review.  Vernoica Long MA

## 2023-10-16 ENCOUNTER — HOSPITAL ENCOUNTER (OUTPATIENT)
Dept: ULTRASOUND IMAGING | Facility: CLINIC | Age: 43
Discharge: HOME | End: 2023-10-16
Attending: OBSTETRICS & GYNECOLOGY
Payer: COMMERCIAL

## 2023-10-16 ENCOUNTER — OFFICE VISIT (OUTPATIENT)
Dept: MATERNAL FETAL MEDICINE | Facility: CLINIC | Age: 43
End: 2023-10-16
Attending: OBSTETRICS & GYNECOLOGY
Payer: COMMERCIAL

## 2023-10-16 VITALS — HEART RATE: 75 BPM | OXYGEN SATURATION: 98 % | DIASTOLIC BLOOD PRESSURE: 75 MMHG | SYSTOLIC BLOOD PRESSURE: 117 MMHG

## 2023-10-16 DIAGNOSIS — O09.523 MULTIGRAVIDA OF ADVANCED MATERNAL AGE IN THIRD TRIMESTER: ICD-10-CM

## 2023-10-16 DIAGNOSIS — O36.5990 FETAL GROWTH RESTRICTION ANTEPARTUM: ICD-10-CM

## 2023-10-16 DIAGNOSIS — O36.5931 POOR FETAL GROWTH AFFECTING MANAGEMENT OF MOTHER IN THIRD TRIMESTER, FETUS 1 OF MULTIPLE GESTATION: ICD-10-CM

## 2023-10-16 DIAGNOSIS — O30.043 DICHORIONIC DIAMNIOTIC TWIN PREGNANCY IN THIRD TRIMESTER: Primary | ICD-10-CM

## 2023-10-16 PROCEDURE — 76820 UMBILICAL ARTERY ECHO: CPT | Mod: 59

## 2023-10-16 PROCEDURE — 99214 OFFICE O/P EST MOD 30 MIN: CPT | Mod: 25 | Performed by: OBSTETRICS & GYNECOLOGY

## 2023-10-16 PROCEDURE — 76816 OB US FOLLOW-UP PER FETUS: CPT | Mod: 26 | Performed by: OBSTETRICS & GYNECOLOGY

## 2023-10-16 PROCEDURE — 76820 UMBILICAL ARTERY ECHO: CPT | Mod: 26 | Performed by: OBSTETRICS & GYNECOLOGY

## 2023-10-16 PROCEDURE — 76816 OB US FOLLOW-UP PER FETUS: CPT

## 2023-10-16 PROCEDURE — 59025 FETAL NON-STRESS TEST: CPT | Mod: XU

## 2023-10-16 PROCEDURE — 59025 FETAL NON-STRESS TEST: CPT | Mod: 26 | Performed by: OBSTETRICS & GYNECOLOGY

## 2023-10-16 PROCEDURE — 59025 FETAL NON-STRESS TEST: CPT

## 2023-10-16 NOTE — NURSING NOTE
Patient presents to [facility] for NST/RL2/UAR at 37w0d due to Di/Di twins, FGR Twin A, GDMA1 . Positive fetal movement. Denies LOF, vaginal bleeding or cramping/contractions. SBAR given to MFM MD, see their note in Epic.

## 2023-10-16 NOTE — PROGRESS NOTES
"Please see \"Imaging\" tab under \"Chart Review\" for details of today's visit.    Damaso Rueda    "

## 2023-10-20 ENCOUNTER — HOSPITAL ENCOUNTER (INPATIENT)
Facility: HOSPITAL | Age: 43
LOS: 2 days | Discharge: HOME | End: 2023-10-22
Attending: OBSTETRICS & GYNECOLOGY | Admitting: OBSTETRICS & GYNECOLOGY
Payer: COMMERCIAL

## 2023-10-20 ENCOUNTER — ANESTHESIA EVENT (OUTPATIENT)
Dept: OBGYN | Facility: HOSPITAL | Age: 43
End: 2023-10-20
Payer: COMMERCIAL

## 2023-10-20 ENCOUNTER — ANESTHESIA (OUTPATIENT)
Dept: OBGYN | Facility: HOSPITAL | Age: 43
End: 2023-10-20
Payer: COMMERCIAL

## 2023-10-20 DIAGNOSIS — O13.3 GESTATIONAL HYPERTENSION, THIRD TRIMESTER: ICD-10-CM

## 2023-10-20 LAB
ABO/RH(D): NORMAL
ALBUMIN SERPL BCG-MCNC: 3.5 G/DL (ref 3.5–5.2)
ALP SERPL-CCNC: 145 U/L (ref 35–104)
ALT SERPL W P-5'-P-CCNC: 17 U/L (ref 0–50)
ANION GAP SERPL CALCULATED.3IONS-SCNC: 13 MMOL/L (ref 7–15)
ANTIBODY SCREEN: NEGATIVE
AST SERPL W P-5'-P-CCNC: 26 U/L (ref 0–45)
BILIRUB SERPL-MCNC: 0.3 MG/DL
BUN SERPL-MCNC: 14 MG/DL (ref 6–20)
CALCIUM SERPL-MCNC: 9.6 MG/DL (ref 8.6–10)
CHLORIDE SERPL-SCNC: 106 MMOL/L (ref 98–107)
CREAT SERPL-MCNC: 0.66 MG/DL (ref 0.51–0.95)
DEPRECATED HCO3 PLAS-SCNC: 20 MMOL/L (ref 22–29)
EGFRCR SERPLBLD CKD-EPI 2021: >90 ML/MIN/1.73M2
ERYTHROCYTE [DISTWIDTH] IN BLOOD BY AUTOMATED COUNT: 13.5 % (ref 10–15)
GLUCOSE SERPL-MCNC: 74 MG/DL (ref 70–99)
HCT VFR BLD AUTO: 40.4 % (ref 35–47)
HGB BLD-MCNC: 13.7 G/DL (ref 11.7–15.7)
HOLD SPECIMEN: NORMAL
MCH RBC QN AUTO: 30.9 PG (ref 26.5–33)
MCHC RBC AUTO-ENTMCNC: 33.9 G/DL (ref 31.5–36.5)
MCV RBC AUTO: 91 FL (ref 78–100)
PLATELET # BLD AUTO: 157 10E3/UL (ref 150–450)
POTASSIUM SERPL-SCNC: 4.4 MMOL/L (ref 3.4–5.3)
PROT SERPL-MCNC: 6.4 G/DL (ref 6.4–8.3)
RBC # BLD AUTO: 4.43 10E6/UL (ref 3.8–5.2)
SODIUM SERPL-SCNC: 139 MMOL/L (ref 135–145)
SPECIMEN EXPIRATION DATE: NORMAL
T PALLIDUM AB SER QL: NONREACTIVE
WBC # BLD AUTO: 6.6 10E3/UL (ref 4–11)

## 2023-10-20 PROCEDURE — 88307 TISSUE EXAM BY PATHOLOGIST: CPT | Mod: 26 | Performed by: PATHOLOGY

## 2023-10-20 PROCEDURE — 250N000011 HC RX IP 250 OP 636: Mod: JZ | Performed by: ANESTHESIOLOGY

## 2023-10-20 PROCEDURE — 272N000001 HC OR GENERAL SUPPLY STERILE: Performed by: OBSTETRICS & GYNECOLOGY

## 2023-10-20 PROCEDURE — 258N000003 HC RX IP 258 OP 636: Performed by: ANESTHESIOLOGY

## 2023-10-20 PROCEDURE — C9290 INJ, BUPIVACAINE LIPOSOME: HCPCS | Performed by: ANESTHESIOLOGY

## 2023-10-20 PROCEDURE — 86901 BLOOD TYPING SEROLOGIC RH(D): CPT | Performed by: OBSTETRICS & GYNECOLOGY

## 2023-10-20 PROCEDURE — 250N000009 HC RX 250: Performed by: ANESTHESIOLOGY

## 2023-10-20 PROCEDURE — 85014 HEMATOCRIT: CPT | Performed by: OBSTETRICS & GYNECOLOGY

## 2023-10-20 PROCEDURE — 250N000013 HC RX MED GY IP 250 OP 250 PS 637: Performed by: OBSTETRICS & GYNECOLOGY

## 2023-10-20 PROCEDURE — 250N000011 HC RX IP 250 OP 636: Performed by: OBSTETRICS & GYNECOLOGY

## 2023-10-20 PROCEDURE — 258N000003 HC RX IP 258 OP 636: Performed by: OBSTETRICS & GYNECOLOGY

## 2023-10-20 PROCEDURE — 370N000017 HC ANESTHESIA TECHNICAL FEE, PER MIN: Performed by: OBSTETRICS & GYNECOLOGY

## 2023-10-20 PROCEDURE — 360N000076 HC SURGERY LEVEL 3, PER MIN: Performed by: OBSTETRICS & GYNECOLOGY

## 2023-10-20 PROCEDURE — 120N000001 HC R&B MED SURG/OB

## 2023-10-20 PROCEDURE — 80053 COMPREHEN METABOLIC PANEL: CPT | Performed by: OBSTETRICS & GYNECOLOGY

## 2023-10-20 PROCEDURE — 88307 TISSUE EXAM BY PATHOLOGIST: CPT | Mod: TC | Performed by: OBSTETRICS & GYNECOLOGY

## 2023-10-20 PROCEDURE — 999N000249 HC STATISTIC C-SECTION ON UNIT

## 2023-10-20 PROCEDURE — 86780 TREPONEMA PALLIDUM: CPT | Performed by: OBSTETRICS & GYNECOLOGY

## 2023-10-20 PROCEDURE — 36415 COLL VENOUS BLD VENIPUNCTURE: CPT | Performed by: OBSTETRICS & GYNECOLOGY

## 2023-10-20 PROCEDURE — 86850 RBC ANTIBODY SCREEN: CPT | Performed by: OBSTETRICS & GYNECOLOGY

## 2023-10-20 RX ORDER — BUPIVACAINE HYDROCHLORIDE 2.5 MG/ML
INJECTION, SOLUTION EPIDURAL; INFILTRATION; INTRACAUDAL
Status: DISCONTINUED | OUTPATIENT
Start: 2023-10-20 | End: 2023-10-20

## 2023-10-20 RX ORDER — DIPHENHYDRAMINE HCL 25 MG
25 CAPSULE ORAL 2 TIMES DAILY PRN
Status: DISCONTINUED | OUTPATIENT
Start: 2023-10-20 | End: 2023-10-22 | Stop reason: HOSPADM

## 2023-10-20 RX ORDER — BISACODYL 10 MG
10 SUPPOSITORY, RECTAL RECTAL DAILY PRN
Status: DISCONTINUED | OUTPATIENT
Start: 2023-10-22 | End: 2023-10-22 | Stop reason: HOSPADM

## 2023-10-20 RX ORDER — LIDOCAINE 40 MG/G
CREAM TOPICAL
Status: DISCONTINUED | OUTPATIENT
Start: 2023-10-20 | End: 2023-10-20 | Stop reason: HOSPADM

## 2023-10-20 RX ORDER — GLYCOPYRROLATE 0.2 MG/ML
INJECTION, SOLUTION INTRAMUSCULAR; INTRAVENOUS PRN
Status: DISCONTINUED | OUTPATIENT
Start: 2023-10-20 | End: 2023-10-20

## 2023-10-20 RX ORDER — FENTANYL CITRATE 50 UG/ML
25-100 INJECTION, SOLUTION INTRAMUSCULAR; INTRAVENOUS
Status: DISCONTINUED | OUTPATIENT
Start: 2023-10-20 | End: 2023-10-20 | Stop reason: HOSPADM

## 2023-10-20 RX ORDER — KETOROLAC TROMETHAMINE 30 MG/ML
30 INJECTION, SOLUTION INTRAMUSCULAR; INTRAVENOUS EVERY 6 HOURS
Qty: 3 ML | Refills: 0 | Status: COMPLETED | OUTPATIENT
Start: 2023-10-20 | End: 2023-10-21

## 2023-10-20 RX ORDER — NALOXONE HYDROCHLORIDE 0.4 MG/ML
0.4 INJECTION, SOLUTION INTRAMUSCULAR; INTRAVENOUS; SUBCUTANEOUS
Status: DISCONTINUED | OUTPATIENT
Start: 2023-10-20 | End: 2023-10-22 | Stop reason: HOSPADM

## 2023-10-20 RX ORDER — CARBOPROST TROMETHAMINE 250 UG/ML
250 INJECTION, SOLUTION INTRAMUSCULAR
Status: DISCONTINUED | OUTPATIENT
Start: 2023-10-20 | End: 2023-10-20 | Stop reason: HOSPADM

## 2023-10-20 RX ORDER — MORPHINE SULFATE 1 MG/ML
INJECTION, SOLUTION EPIDURAL; INTRATHECAL; INTRAVENOUS PRN
Status: DISCONTINUED | OUTPATIENT
Start: 2023-10-20 | End: 2023-10-20

## 2023-10-20 RX ORDER — ACETAMINOPHEN 325 MG/1
975 TABLET ORAL ONCE
Status: COMPLETED | OUTPATIENT
Start: 2023-10-20 | End: 2023-10-20

## 2023-10-20 RX ORDER — OXYTOCIN 10 [USP'U]/ML
10 INJECTION, SOLUTION INTRAMUSCULAR; INTRAVENOUS
Status: DISCONTINUED | OUTPATIENT
Start: 2023-10-20 | End: 2023-10-20 | Stop reason: HOSPADM

## 2023-10-20 RX ORDER — CITRIC ACID/SODIUM CITRATE 334-500MG
30 SOLUTION, ORAL ORAL
Status: COMPLETED | OUTPATIENT
Start: 2023-10-20 | End: 2023-10-20

## 2023-10-20 RX ORDER — METOCLOPRAMIDE 10 MG/1
10 TABLET ORAL EVERY 6 HOURS PRN
Status: DISCONTINUED | OUTPATIENT
Start: 2023-10-20 | End: 2023-10-22 | Stop reason: HOSPADM

## 2023-10-20 RX ORDER — MISOPROSTOL 200 UG/1
400 TABLET ORAL
Status: DISCONTINUED | OUTPATIENT
Start: 2023-10-20 | End: 2023-10-20 | Stop reason: HOSPADM

## 2023-10-20 RX ORDER — OXYTOCIN/0.9 % SODIUM CHLORIDE 30/500 ML
340 PLASTIC BAG, INJECTION (ML) INTRAVENOUS CONTINUOUS PRN
Status: DISCONTINUED | OUTPATIENT
Start: 2023-10-20 | End: 2023-10-20 | Stop reason: HOSPADM

## 2023-10-20 RX ORDER — MORPHINE SULFATE 1 MG/ML
INJECTION, SOLUTION EPIDURAL; INTRATHECAL; INTRAVENOUS
Status: COMPLETED | OUTPATIENT
Start: 2023-10-20 | End: 2023-10-20

## 2023-10-20 RX ORDER — SODIUM CHLORIDE, SODIUM LACTATE, POTASSIUM CHLORIDE, CALCIUM CHLORIDE 600; 310; 30; 20 MG/100ML; MG/100ML; MG/100ML; MG/100ML
INJECTION, SOLUTION INTRAVENOUS CONTINUOUS
Status: DISCONTINUED | OUTPATIENT
Start: 2023-10-20 | End: 2023-10-20 | Stop reason: HOSPADM

## 2023-10-20 RX ORDER — ACETAMINOPHEN 325 MG/1
975 TABLET ORAL EVERY 6 HOURS
Status: DISCONTINUED | OUTPATIENT
Start: 2023-10-20 | End: 2023-10-22 | Stop reason: HOSPADM

## 2023-10-20 RX ORDER — AMOXICILLIN 250 MG
2 CAPSULE ORAL 2 TIMES DAILY
Status: DISCONTINUED | OUTPATIENT
Start: 2023-10-20 | End: 2023-10-22 | Stop reason: HOSPADM

## 2023-10-20 RX ORDER — MISOPROSTOL 200 UG/1
400 TABLET ORAL
Status: DISCONTINUED | OUTPATIENT
Start: 2023-10-20 | End: 2023-10-22 | Stop reason: HOSPADM

## 2023-10-20 RX ORDER — DEXAMETHASONE SODIUM PHOSPHATE 4 MG/ML
INJECTION, SOLUTION INTRA-ARTICULAR; INTRALESIONAL; INTRAMUSCULAR; INTRAVENOUS; SOFT TISSUE PRN
Status: DISCONTINUED | OUTPATIENT
Start: 2023-10-20 | End: 2023-10-20

## 2023-10-20 RX ORDER — CEFAZOLIN SODIUM/WATER 2 G/20 ML
2 SYRINGE (ML) INTRAVENOUS SEE ADMIN INSTRUCTIONS
Status: DISCONTINUED | OUTPATIENT
Start: 2023-10-20 | End: 2023-10-20 | Stop reason: HOSPADM

## 2023-10-20 RX ORDER — OXYTOCIN 10 [USP'U]/ML
10 INJECTION, SOLUTION INTRAMUSCULAR; INTRAVENOUS
Status: DISCONTINUED | OUTPATIENT
Start: 2023-10-20 | End: 2023-10-22 | Stop reason: HOSPADM

## 2023-10-20 RX ORDER — CEFAZOLIN SODIUM/WATER 2 G/20 ML
2 SYRINGE (ML) INTRAVENOUS
Status: COMPLETED | OUTPATIENT
Start: 2023-10-20 | End: 2023-10-20

## 2023-10-20 RX ORDER — OXYTOCIN/0.9 % SODIUM CHLORIDE 30/500 ML
100-340 PLASTIC BAG, INJECTION (ML) INTRAVENOUS CONTINUOUS PRN
Status: DISCONTINUED | OUTPATIENT
Start: 2023-10-20 | End: 2023-10-22 | Stop reason: HOSPADM

## 2023-10-20 RX ORDER — MISOPROSTOL 200 UG/1
800 TABLET ORAL
Status: DISCONTINUED | OUTPATIENT
Start: 2023-10-20 | End: 2023-10-22 | Stop reason: HOSPADM

## 2023-10-20 RX ORDER — METHYLERGONOVINE MALEATE 0.2 MG/1
200 TABLET ORAL EVERY 6 HOURS SCHEDULED
Status: DISCONTINUED | OUTPATIENT
Start: 2023-10-20 | End: 2023-10-21

## 2023-10-20 RX ORDER — LIDOCAINE 40 MG/G
CREAM TOPICAL
Status: DISCONTINUED | OUTPATIENT
Start: 2023-10-20 | End: 2023-10-22 | Stop reason: HOSPADM

## 2023-10-20 RX ORDER — DEXTROSE, SODIUM CHLORIDE, SODIUM LACTATE, POTASSIUM CHLORIDE, AND CALCIUM CHLORIDE 5; .6; .31; .03; .02 G/100ML; G/100ML; G/100ML; G/100ML; G/100ML
INJECTION, SOLUTION INTRAVENOUS CONTINUOUS
Status: DISCONTINUED | OUTPATIENT
Start: 2023-10-20 | End: 2023-10-22 | Stop reason: HOSPADM

## 2023-10-20 RX ORDER — NALOXONE HYDROCHLORIDE 0.4 MG/ML
0.2 INJECTION, SOLUTION INTRAMUSCULAR; INTRAVENOUS; SUBCUTANEOUS
Status: DISCONTINUED | OUTPATIENT
Start: 2023-10-20 | End: 2023-10-22 | Stop reason: HOSPADM

## 2023-10-20 RX ORDER — OXYCODONE HYDROCHLORIDE 5 MG/1
5 TABLET ORAL EVERY 4 HOURS PRN
Status: DISCONTINUED | OUTPATIENT
Start: 2023-10-20 | End: 2023-10-22 | Stop reason: HOSPADM

## 2023-10-20 RX ORDER — METOCLOPRAMIDE HYDROCHLORIDE 5 MG/ML
10 INJECTION INTRAMUSCULAR; INTRAVENOUS EVERY 6 HOURS PRN
Status: DISCONTINUED | OUTPATIENT
Start: 2023-10-20 | End: 2023-10-22 | Stop reason: HOSPADM

## 2023-10-20 RX ORDER — PROCHLORPERAZINE 25 MG
25 SUPPOSITORY, RECTAL RECTAL EVERY 12 HOURS PRN
Status: DISCONTINUED | OUTPATIENT
Start: 2023-10-20 | End: 2023-10-22 | Stop reason: HOSPADM

## 2023-10-20 RX ORDER — CARBOPROST TROMETHAMINE 250 UG/ML
250 INJECTION, SOLUTION INTRAMUSCULAR
Status: DISCONTINUED | OUTPATIENT
Start: 2023-10-20 | End: 2023-10-22 | Stop reason: HOSPADM

## 2023-10-20 RX ORDER — METHYLERGONOVINE MALEATE 0.2 MG/ML
200 INJECTION INTRAVENOUS
Status: DISCONTINUED | OUTPATIENT
Start: 2023-10-20 | End: 2023-10-22 | Stop reason: HOSPADM

## 2023-10-20 RX ORDER — PROCHLORPERAZINE MALEATE 10 MG
10 TABLET ORAL EVERY 6 HOURS PRN
Status: DISCONTINUED | OUTPATIENT
Start: 2023-10-20 | End: 2023-10-22 | Stop reason: HOSPADM

## 2023-10-20 RX ORDER — HYDROCORTISONE 25 MG/G
CREAM TOPICAL 3 TIMES DAILY PRN
Status: DISCONTINUED | OUTPATIENT
Start: 2023-10-20 | End: 2023-10-22 | Stop reason: HOSPADM

## 2023-10-20 RX ORDER — METHYLERGONOVINE MALEATE 0.2 MG/ML
200 INJECTION INTRAVENOUS
Status: DISCONTINUED | OUTPATIENT
Start: 2023-10-20 | End: 2023-10-20 | Stop reason: HOSPADM

## 2023-10-20 RX ORDER — ONDANSETRON 2 MG/ML
INJECTION INTRAMUSCULAR; INTRAVENOUS PRN
Status: DISCONTINUED | OUTPATIENT
Start: 2023-10-20 | End: 2023-10-20

## 2023-10-20 RX ORDER — MISOPROSTOL 200 UG/1
800 TABLET ORAL
Status: DISCONTINUED | OUTPATIENT
Start: 2023-10-20 | End: 2023-10-20 | Stop reason: HOSPADM

## 2023-10-20 RX ORDER — SIMETHICONE 80 MG
80 TABLET,CHEWABLE ORAL 4 TIMES DAILY PRN
Status: DISCONTINUED | OUTPATIENT
Start: 2023-10-20 | End: 2023-10-22 | Stop reason: HOSPADM

## 2023-10-20 RX ORDER — BUPIVACAINE HYDROCHLORIDE 7.5 MG/ML
INJECTION, SOLUTION INTRASPINAL
Status: COMPLETED | OUTPATIENT
Start: 2023-10-20 | End: 2023-10-20

## 2023-10-20 RX ORDER — ONDANSETRON 2 MG/ML
4 INJECTION INTRAMUSCULAR; INTRAVENOUS EVERY 6 HOURS PRN
Status: DISCONTINUED | OUTPATIENT
Start: 2023-10-20 | End: 2023-10-22 | Stop reason: HOSPADM

## 2023-10-20 RX ORDER — MODIFIED LANOLIN
OINTMENT (GRAM) TOPICAL
Status: DISCONTINUED | OUTPATIENT
Start: 2023-10-20 | End: 2023-10-22 | Stop reason: HOSPADM

## 2023-10-20 RX ORDER — OXYTOCIN/0.9 % SODIUM CHLORIDE 30/500 ML
PLASTIC BAG, INJECTION (ML) INTRAVENOUS CONTINUOUS PRN
Status: DISCONTINUED | OUTPATIENT
Start: 2023-10-20 | End: 2023-10-20

## 2023-10-20 RX ORDER — AMOXICILLIN 250 MG
1 CAPSULE ORAL 2 TIMES DAILY
Status: DISCONTINUED | OUTPATIENT
Start: 2023-10-20 | End: 2023-10-22 | Stop reason: HOSPADM

## 2023-10-20 RX ORDER — ONDANSETRON 4 MG/1
4 TABLET, ORALLY DISINTEGRATING ORAL EVERY 6 HOURS PRN
Status: DISCONTINUED | OUTPATIENT
Start: 2023-10-20 | End: 2023-10-22 | Stop reason: HOSPADM

## 2023-10-20 RX ORDER — IBUPROFEN 800 MG/1
800 TABLET, FILM COATED ORAL EVERY 6 HOURS
Status: DISCONTINUED | OUTPATIENT
Start: 2023-10-21 | End: 2023-10-22 | Stop reason: HOSPADM

## 2023-10-20 RX ORDER — OXYTOCIN/0.9 % SODIUM CHLORIDE 30/500 ML
340 PLASTIC BAG, INJECTION (ML) INTRAVENOUS CONTINUOUS PRN
Status: DISCONTINUED | OUTPATIENT
Start: 2023-10-20 | End: 2023-10-22 | Stop reason: HOSPADM

## 2023-10-20 RX ADMIN — ONDANSETRON 4 MG: 2 INJECTION INTRAMUSCULAR; INTRAVENOUS at 13:27

## 2023-10-20 RX ADMIN — PHENYLEPHRINE HYDROCHLORIDE 100 MCG: 10 INJECTION INTRAVENOUS at 13:28

## 2023-10-20 RX ADMIN — SODIUM CHLORIDE, SODIUM LACTATE, POTASSIUM CHLORIDE, CALCIUM CHLORIDE AND DEXTROSE MONOHYDRATE: 5; 600; 310; 30; 20 INJECTION, SOLUTION INTRAVENOUS at 15:42

## 2023-10-20 RX ADMIN — SODIUM CHLORIDE, POTASSIUM CHLORIDE, SODIUM LACTATE AND CALCIUM CHLORIDE: 600; 310; 30; 20 INJECTION, SOLUTION INTRAVENOUS at 12:29

## 2023-10-20 RX ADMIN — METHYLERGONOVINE 200 MCG: 0.2 TABLET ORAL at 21:32

## 2023-10-20 RX ADMIN — KETOROLAC TROMETHAMINE 30 MG: 30 INJECTION, SOLUTION INTRAMUSCULAR; INTRAVENOUS at 15:02

## 2023-10-20 RX ADMIN — BUPIVACAINE 20 ML: 13.3 INJECTION, SUSPENSION, LIPOSOMAL INFILTRATION at 14:20

## 2023-10-20 RX ADMIN — ONDANSETRON 4 MG: 2 INJECTION INTRAMUSCULAR; INTRAVENOUS at 14:29

## 2023-10-20 RX ADMIN — ACETAMINOPHEN 975 MG: 325 TABLET ORAL at 18:25

## 2023-10-20 RX ADMIN — DIPHENHYDRAMINE HYDROCHLORIDE 25 MG: 25 CAPSULE ORAL at 22:33

## 2023-10-20 RX ADMIN — TRANEXAMIC ACID 1 G: 1 INJECTION, SOLUTION INTRAVENOUS at 13:52

## 2023-10-20 RX ADMIN — Medication 2 G: at 13:28

## 2023-10-20 RX ADMIN — ACETAMINOPHEN 975 MG: 325 TABLET ORAL at 12:37

## 2023-10-20 RX ADMIN — Medication 300 ML/HR: at 13:49

## 2023-10-20 RX ADMIN — Medication 0.2 MG: at 13:20

## 2023-10-20 RX ADMIN — GLYCOPYRROLATE 0.1 MG: 0.2 INJECTION INTRAMUSCULAR; INTRAVENOUS at 13:30

## 2023-10-20 RX ADMIN — SODIUM CHLORIDE, POTASSIUM CHLORIDE, SODIUM LACTATE AND CALCIUM CHLORIDE: 600; 310; 30; 20 INJECTION, SOLUTION INTRAVENOUS at 13:43

## 2023-10-20 RX ADMIN — METOCLOPRAMIDE 10 MG: 5 INJECTION, SOLUTION INTRAMUSCULAR; INTRAVENOUS at 18:34

## 2023-10-20 RX ADMIN — KETOROLAC TROMETHAMINE 30 MG: 30 INJECTION, SOLUTION INTRAMUSCULAR; INTRAVENOUS at 21:32

## 2023-10-20 RX ADMIN — SENNOSIDES AND DOCUSATE SODIUM 1 TABLET: 8.6; 5 TABLET ORAL at 21:32

## 2023-10-20 RX ADMIN — METHYLERGONOVINE MALEATE 200 MCG: 0.2 INJECTION INTRAVENOUS at 15:25

## 2023-10-20 RX ADMIN — BUPIVACAINE HYDROCHLORIDE 20 ML: 2.5 INJECTION, SOLUTION EPIDURAL; INFILTRATION; INTRACAUDAL at 14:20

## 2023-10-20 RX ADMIN — DEXAMETHASONE SODIUM PHOSPHATE 4 MG: 4 INJECTION, SOLUTION INTRA-ARTICULAR; INTRALESIONAL; INTRAMUSCULAR; INTRAVENOUS; SOFT TISSUE at 14:29

## 2023-10-20 RX ADMIN — SODIUM CHLORIDE, POTASSIUM CHLORIDE, SODIUM LACTATE AND CALCIUM CHLORIDE: 600; 310; 30; 20 INJECTION, SOLUTION INTRAVENOUS at 14:29

## 2023-10-20 RX ADMIN — SODIUM CHLORIDE, POTASSIUM CHLORIDE, SODIUM LACTATE AND CALCIUM CHLORIDE 500 ML: 600; 310; 30; 20 INJECTION, SOLUTION INTRAVENOUS at 11:52

## 2023-10-20 RX ADMIN — SODIUM CITRATE AND CITRIC ACID MONOHYDRATE 30 ML: 500; 334 SOLUTION ORAL at 12:37

## 2023-10-20 RX ADMIN — BUPIVACAINE HYDROCHLORIDE IN DEXTROSE 1.6 ML: 7.5 INJECTION, SOLUTION SUBARACHNOID at 13:20

## 2023-10-20 RX ADMIN — SODIUM CHLORIDE, POTASSIUM CHLORIDE, SODIUM LACTATE AND CALCIUM CHLORIDE: 600; 310; 30; 20 INJECTION, SOLUTION INTRAVENOUS at 13:11

## 2023-10-20 RX ADMIN — GLYCOPYRROLATE 0.2 MG: 0.2 INJECTION INTRAMUSCULAR; INTRAVENOUS at 13:28

## 2023-10-20 RX ADMIN — SODIUM CHLORIDE, SODIUM LACTATE, POTASSIUM CHLORIDE, CALCIUM CHLORIDE AND DEXTROSE MONOHYDRATE: 5; 600; 310; 30; 20 INJECTION, SOLUTION INTRAVENOUS at 19:47

## 2023-10-20 RX ADMIN — PHENYLEPHRINE HYDROCHLORIDE 0.5 MCG/KG/MIN: 10 INJECTION INTRAVENOUS at 13:25

## 2023-10-20 ASSESSMENT — ACTIVITIES OF DAILY LIVING (ADL)
ADLS_ACUITY_SCORE: 20
ADLS_ACUITY_SCORE: 18
ADLS_ACUITY_SCORE: 20

## 2023-10-20 NOTE — ANESTHESIA PREPROCEDURE EVALUATION
Anesthesia Pre-Procedure Evaluation    Patient: Amy Clemens   MRN: [id number] : 1980        Procedure : Procedure(s):   SECTION          Past Medical History:   Diagnosis Date    Endometriosis     Gastroesophageal reflux disease     Thyroid disease     Tubal occlusion       Past Surgical History:   Procedure Laterality Date    CHOLECYSTECTOMY      DILATION AND CURETTAGE, OPERATIVE HYSTEROSCOPY WITH MORCELLATOR, COMBINED N/A 2019    Procedure: hysteroscopy, dilation and curettage, polypectomy with myosure;  Surgeon: Floresita Zimmerman MD;  Location: [facility] OR    DILATION AND CURETTAGE, OPERATIVE HYSTEROSCOPY WITH MORCELLATOR, COMBINED N/A 2020    Procedure: Hysteroscopy, dilation and curettage, endometrial polypectomy;  Surgeon: Floresita Zimmerman MD;  Location: [facility] OR    DILATION AND CURETTAGE, OPERATIVE HYSTEROSCOPY WITH MORCELLATOR, COMBINED N/A 2020    Procedure: diagnostic HYSTEROSCOPY, POLYPECTOMY WITH MYOSURE MORCELLATOR, sharp DILATION AND CURETTAGE;  Surgeon: Arabella Felix MD;  Location: [facility] OR    LAPAROSCOPY DIAGNOSTIC (GYN) N/A 3/18/2021    Procedure: Laparoscopy,  laparotomy for tubal reanastomosis;  Surgeon: Nick Herrera MD;  Location:  OR    TUBAL LIGATION  2006    tubal ligation    TUBOPLASTY REANASTOMOSIS N/A 3/18/2021    Procedure: REANASTOMOSIS, FALLOPIAN TUBE;  Surgeon: Nick Herrera MD;  Location: [facility] OR      No Known Allergies   Social History     Tobacco Use    Smoking status: Never    Smokeless tobacco: Never   Substance Use Topics    Alcohol use: No      Wt Readings from Last 1 Encounters:   23 88.9 kg (196 lb)        Anesthesia Evaluation   Pt has had prior anesthetic.         ROS/MED HX  ENT/Pulmonary:  - neg pulmonary ROS     Neurologic:  - neg neurologic ROS     Cardiovascular:  - neg cardiovascular ROS     METS/Exercise Tolerance:     Hematologic:  - neg hematologic  ROS     Musculoskeletal:  - neg musculoskeletal ROS     GI/Hepatic:      (+) GERD,                   Renal/Genitourinary:  - neg Renal ROS     Endo:     (+)          thyroid problem,     Obesity,       Psychiatric/Substance Use:       Infectious Disease:  - neg infectious disease ROS     Malignancy:       Other:      (+) Possibly pregnant, , ,         Physical Exam    Airway  airway exam normal      Mallampati: II   TM distance: > 3 FB   Neck ROM: full   Mouth opening: > 3 cm    Respiratory Devices and Support         Dental       (+) Minor Abnormalities - some fillings, tiny chips      Cardiovascular   cardiovascular exam normal       Rhythm and rate: regular and normal     Pulmonary   pulmonary exam normal        breath sounds clear to auscultation           OUTSIDE LABS:  CBC:   Lab Results   Component Value Date    WBC 6.6 10/20/2023    WBC 10.3 05/13/2023    HGB 13.7 10/20/2023    HGB 12.9 05/13/2023    HCT 40.4 10/20/2023    HCT 38.7 05/13/2023     10/20/2023     05/13/2023     BMP:   Lab Results   Component Value Date     10/20/2023     (L) 03/05/2023    POTASSIUM 4.4 10/20/2023    POTASSIUM 4.2 03/05/2023    CHLORIDE 106 10/20/2023    CHLORIDE 101 03/05/2023    CO2 20 (L) 10/20/2023    CO2 20 (L) 03/05/2023    BUN 14.0 10/20/2023    BUN 9.2 03/05/2023    CR 0.66 10/20/2023    CR 0.54 03/05/2023    GLC 74 10/20/2023    GLC 95 03/05/2023     COAGS:   Lab Results   Component Value Date    INR 0.96 03/15/2021     POC:   Lab Results   Component Value Date    HCG Negative 09/29/2020     HEPATIC:   Lab Results   Component Value Date    ALBUMIN 3.5 10/20/2023    PROTTOTAL 6.4 10/20/2023    ALT 17 10/20/2023    AST 26 10/20/2023    ALKPHOS 145 (H) 10/20/2023    BILITOTAL 0.3 10/20/2023     OTHER:   Lab Results   Component Value Date    DAVID 9.6 10/20/2023    LIPASE 164 03/11/2018    TSH 1.20 04/10/2020    SED 7 10/15/2016       Anesthesia Plan    ASA Status:  2    NPO Status:  NPO Appropriate    Anesthesia Type: Spinal.              Consents    Anesthesia  Plan(s) and associated risks, benefits, and realistic alternatives discussed. Questions answered and patient/representative(s) expressed understanding.     - Discussed: Risks, Benefits and Alternatives for BOTH SEDATION and the PROCEDURE were discussed     - Discussed with:  Patient, Spouse      - Extended Intubation/Ventilatory Support Discussed: No.           Postoperative Care    Pain management: IV analgesics, Oral pain medications, Peripheral nerve block (Single Shot), intrathecal morphine, Multi-modal analgesia.   PONV prophylaxis: Ondansetron (or other 5HT-3)     Comments:                Ernesto Barone MD

## 2023-10-20 NOTE — ANESTHESIA PROCEDURE NOTES
TAP Procedure Note    Pre-Procedure   Staff -        Anesthesiologist:  Ernesto Barone MD       Performed By: anesthesiologist       Location: OR       Procedure Start/Stop Times: 10/20/2023 2:20 PM and 10/20/2023 2:26 PM       Pre-Anesthestic Checklist: patient identified, IV checked, site marked, risks and benefits discussed, informed consent, monitors and equipment checked, pre-op evaluation, at physician/surgeon's request and post-op pain management  Timeout:       Correct Patient: Yes        Correct Procedure: Yes        Correct Site: Yes        Correct Position: Yes        Correct Laterality: Yes        Site Marked: Yes  Procedure Documentation  Procedure: TAP       Diagnosis:        Laterality: bilateral       Patient Position: supine       Patient Prep/Sterile Barriers: sterile gloves, mask       Skin prep: Chloraprep       Insertion Site: T9-10.       Needle Type: short bevel       Needle Gauge: 20.        Needle Length (Inches): 6        Ultrasound guided       1. Ultrasound was used to identify targeted nerve, plexus, vascular marker, or fascial plane and place a needle adjacent to it in real-time.       2. Ultrasound was used to visualize the spread of anesthetic in close proximity to the above referenced structure.       3. A permanent image is entered into the patient's record.       4. The visualized anatomic structures appeared normal.       5. There were no apparent abnormal pathologic findings.    Assessment/Narrative         The placement was negative for: blood aspirated, painful injection and site bleeding       Paresthesias: No.       Bolus given via needle..        Secured via.        Insertion/Infusion Method: Single Shot       Complications: none    Medication(s) Administered   Bupivacaine 0.25% PF (Infiltration) - Infiltration   20 mL - 10/20/2023 2:20:00 PM  Bupivacaine liposome (Exparel) 1.3% LA inj susp (Infiltration) - Infiltration   20 mL - 10/20/2023 2:20:00  "PM  Medication Administration Time: 10/20/2023 2:20 PM      FOR [facility] ONLY:   Pain Team Contact information: please page the Pain Team Via MAZ. Search \"Pain\". During daytime hours, please page the attending first. At night please page the resident first.      "

## 2023-10-20 NOTE — ANESTHESIA CARE TRANSFER NOTE
Patient: Amy Clemens    Procedure: Procedure(s):   SECTION       Diagnosis: Previous  delivery, antepartum [O34.219]  Twin pregnancy [O30.009]  Diagnosis Additional Information: No value filed.    Anesthesia Type:   No value filed.     Note:    Oropharynx: oropharynx clear of all foreign objects and spontaneously breathing  Level of Consciousness: awake  Oxygen Supplementation: room air    Independent Airway: airway patency satisfactory and stable  Dentition: dentition unchanged  Vital Signs Stable: post-procedure vital signs reviewed and stable  Report to RN Given: handoff report given  Patient transferred to: Labor and Delivery    Handoff Report: Identifed the Patient, Identified the Reponsible Provider, Reviewed the pertinent medical history, Discussed the surgical course, Reviewed Intra-OP anesthesia mangement and issues during anesthesia, Set expectations for post-procedure period and Allowed opportunity for questions and acknowledgement of understanding      Vitals:  Vitals Value Taken Time   /66 10/20/23 1438   Temp 97.9    Pulse 74    Resp 18    SpO2 97%    Vitals shown include unfiled device data.    Electronically Signed By: KYRIE Underwood CRNA  2023  2:47 PM

## 2023-10-20 NOTE — OP NOTE
Section Operative Note    Amy Clemens  2023    Pre-operative Diagnosis:    at 37w4d   Di di twin gestation  Desires  section  IVF pregnancy, donor egg      Post-operative Diagnosis: Same    Procedure done: LTCS    Surgeon: Jessie Hogan MD    Assist: Shu Oneill    Anesthesia: combined spinal-epidural    Complications:  None    EBL: 700 ml      IV fluids- see anesthesia record    Drains- ricketts catheter    Findings:  Infant A, delivered second, breech  Weight 4 lbs 12 oz  APGARS- pending    Infant B, delivered first, was transverse, delivered cephalic  Weight 5 lbs 7 oz  APGARS pending    3-V cord  Normal tubes and ovaries.    Indications:   Patient is a 43 year old  , who was admitted at 37w4d weeks pregnancy for  section second to di di twins with severe growth restriction of fetus A.  r/b/a were discussed and all questions were answered.     Procedure Details:  IV antibiotics given per protocol.  SCD placed for VTE prophylaxis.  Spinal anesthesia administered, checked and found to be adequate.  Ricketts catheter was in place.  The patient was draped and prepped in the usual sterile manner in the dorsal postion with a left tilt. A Pfannenstiel incision was made and carried down through the subcutaneous tissue to the fascia. Fascial incision was made and extended transversely.The fascia was  from the underlying rectus tissue superiorly and inferiorly. The peritoneum was identified and entered bluntly. Peritoneal incision was extended with careful visualization of bladder.  The bladder blade was inserted.   The utero-vesical peritoneal reflection was opened sharply and extended latteraly.  The bladder blade was then replaced.     Transverse incision made in the lower uterine segment above the bladder.    Infant B was delivered first.  Infant was transverse, maternal left side, delivered cephalic. Nose and mouth suctioned at the abdominal wall. Rest  of the infant delivered without complications. After the umbilical cord was clamped and cut cord blood sampled.    Infant A was delivered from the breech presentation.     The placenta was allowed to separate and expelled spontaneously with membranes.     The uterus was well retracted and exteriorized.  She was given pitocin and TXA.   Tubes and ovaries appeared normal.   The uterine incision was closed with running locked sutures of 0 Vicryl.   Second layer of sutures used to imbricate the initial layer.  Hemostasis was observed. Uterus returned to the abdomen.  Suctioning of the peritoneal cavity was carried out. Para-colic gutters were cleared of all clots and debris.  Hysterotomy once again checked to ascertain hemostasis.  Surgicel powder was placed under the bladder flap.   The fascia was closed with running sutures of 0 Vicryl.   Subcutaneous tissue re approximated with 3-0 vicryl  The skin was closed in a subcuticular fashion with 4-0 monocryl and dermabond.  Instrument, sponge, and needle counts were correct x 3   Patient and the baby were returned to the recovery room in a stable condition.

## 2023-10-20 NOTE — PROGRESS NOTES
Provider notified d/t increased bleeding amount. 100ml in first 30mins upon return from OR. Orders to give IM methergine and placed orders for oral q6 for 24 hours.

## 2023-10-20 NOTE — PLAN OF CARE
"  Problem: Adult Inpatient Plan of Care  Goal: Plan of Care Review  Description: The Plan of Care Review/Shift note should be completed every shift.  The Outcome Evaluation is a brief statement about your assessment that the patient is improving, declining, or no change.  This information will be displayed automatically on your shift  note.  Outcome: Progressing  Goal: Patient-Specific Goal (Individualized)  Description: You can add care plan individualizations to a care plan. Examples of Individualization might be:  \"Parent requests to be called daily at 9am for status\", \"I have a hard time hearing out of my right ear\", or \"Do not touch me to wake me up as it startles  me\".  Outcome: Progressing  Goal: Absence of Hospital-Acquired Illness or Injury  Outcome: Progressing  Goal: Optimal Comfort and Wellbeing  Outcome: Progressing  Goal: Readiness for Transition of Care  Outcome: Progressing  Intervention: Mutually Develop Transition Plan  Recent Flowsheet Documentation  Taken 10/20/2023 1156 by Jovita Carbone RN  Equipment Currently Used at Home: none     Problem: Postpartum ( Delivery)  Goal: Successful Parent Role Transition  Outcome: Progressing  Goal: Hemostasis  Outcome: Progressing  Goal: Effective Bowel Elimination  Outcome: Progressing  Goal: Fluid and Electrolyte Balance  Outcome: Progressing  Goal: Absence of Infection Signs and Symptoms  Outcome: Progressing  Goal: Anesthesia/Sedation Recovery  Outcome: Progressing  Goal: Optimal Pain Control and Function  Outcome: Progressing  Goal: Nausea and Vomiting Relief  Outcome: Progressing  Goal: Effective Urinary Elimination  Outcome: Progressing  Goal: Effective Oxygenation and Ventilation  Outcome: Progressing   Goal Outcome Evaluation:                        "

## 2023-10-20 NOTE — ANESTHESIA POSTPROCEDURE EVALUATION
Patient: Amy Clemens    Procedure: Procedure(s):   SECTION       Anesthesia Type:  No value filed.    Note:  Disposition: Inpatient   Postop Pain Control: Uneventful            Sign Out: Well controlled pain   PONV: No   Neuro/Psych: Uneventful            Sign Out: Acceptable/Baseline neuro status   Airway/Respiratory: Uneventful            Sign Out: Acceptable/Baseline resp. status   CV/Hemodynamics: Uneventful            Sign Out: Acceptable CV status; No obvious hypovolemia; No obvious fluid overload   Other NRE: NONE   DID A NON-ROUTINE EVENT OCCUR? No           Last vitals:  Vitals:    10/20/23 1200 10/20/23 1203   BP:  (!) 140/71   Resp: 16    Temp: 36.8  C (98.3  F)        Electronically Signed By: Ernesto Barone MD  2023  3:30 PM

## 2023-10-20 NOTE — H&P
Amy Clemens is an 43 year old female.  Patient presenting for repeat  section.  She is pregnancy with di di twins, IVF, donor egg.  She was followed by M for fetal growth restriction.    Fetus A, with severe growth restriction, <1%ile.  B EFW 17th%ile and AC 15th%ile on last imaging 10/16/23.     She has otherwise had a relatively uncomplicated pregnancy.  She had no concerns with hypertension during her prenatal care.        Past Medical History:   Diagnosis Date    Endometriosis     Gastroesophageal reflux disease     Thyroid disease     Tubal occlusion        Allergies: No Known Allergies    Active Problems:    * No active hospital problems. *    Blood pressure (!) 140/71, temperature 98.3  F (36.8  C), temperature source Oral, resp. rate 16, not currently breastfeeding.    Review of Systems   All other systems reviewed and are negative.      Physical Exam  General, alert, comfortable  Chest: CTAB, RRR  Abd: gravid, fetal movement noted    Assessment:  43 year old  at terms, SHADY 23, 37+4 weeks gestation.     Plan:   section for di di twins, A with severe growth restriction  Will watch for signs and symptoms of pre-eclampsia, she is at increased risk, she had an elevated blood pressure on admission.  Labs pending    Jessie Hogan MD  10/20/2023

## 2023-10-20 NOTE — PLAN OF CARE
Problem: Adult Inpatient Plan of Care  Goal: Plan of Care Review  Outcome: Progressing   Goal Outcome Evaluation:  Patient is progressing well. VSS and afebrile. Fundus firm, light bleeding, and no clots at this time. Attempted to stand up at bedside but patient felt dizzy and uneasy on her feet. Will re attempt to ambulate in an hour. Pain managed with Tylenol and toradol.Will continue to encourage drinking fluids.

## 2023-10-21 LAB
ALBUMIN SERPL BCG-MCNC: 2.9 G/DL (ref 3.5–5.2)
ALP SERPL-CCNC: 111 U/L (ref 35–104)
ALT SERPL W P-5'-P-CCNC: 12 U/L (ref 0–50)
ANION GAP SERPL CALCULATED.3IONS-SCNC: 8 MMOL/L (ref 7–15)
AST SERPL W P-5'-P-CCNC: 23 U/L (ref 0–45)
BILIRUB SERPL-MCNC: <0.2 MG/DL
BUN SERPL-MCNC: 18.2 MG/DL (ref 6–20)
CALCIUM SERPL-MCNC: 9 MG/DL (ref 8.6–10)
CHLORIDE SERPL-SCNC: 101 MMOL/L (ref 98–107)
CREAT SERPL-MCNC: 0.78 MG/DL (ref 0.51–0.95)
DEPRECATED HCO3 PLAS-SCNC: 23 MMOL/L (ref 22–29)
EGFRCR SERPLBLD CKD-EPI 2021: >90 ML/MIN/1.73M2
ERYTHROCYTE [DISTWIDTH] IN BLOOD BY AUTOMATED COUNT: 13.3 % (ref 10–15)
GLUCOSE SERPL-MCNC: 111 MG/DL (ref 70–99)
HCT VFR BLD AUTO: 35.9 % (ref 35–47)
HGB BLD-MCNC: 11.7 G/DL (ref 11.7–15.7)
MCH RBC QN AUTO: 30.5 PG (ref 26.5–33)
MCHC RBC AUTO-ENTMCNC: 32.6 G/DL (ref 31.5–36.5)
MCV RBC AUTO: 94 FL (ref 78–100)
PLATELET # BLD AUTO: 155 10E3/UL (ref 150–450)
POTASSIUM SERPL-SCNC: 4.7 MMOL/L (ref 3.4–5.3)
PROT SERPL-MCNC: 5.2 G/DL (ref 6.4–8.3)
RBC # BLD AUTO: 3.83 10E6/UL (ref 3.8–5.2)
SODIUM SERPL-SCNC: 132 MMOL/L (ref 135–145)
WBC # BLD AUTO: 12 10E3/UL (ref 4–11)

## 2023-10-21 PROCEDURE — 120N000001 HC R&B MED SURG/OB

## 2023-10-21 PROCEDURE — 250N000011 HC RX IP 250 OP 636

## 2023-10-21 PROCEDURE — 36415 COLL VENOUS BLD VENIPUNCTURE: CPT | Performed by: OBSTETRICS & GYNECOLOGY

## 2023-10-21 PROCEDURE — 85027 COMPLETE CBC AUTOMATED: CPT | Performed by: OBSTETRICS & GYNECOLOGY

## 2023-10-21 PROCEDURE — 80053 COMPREHEN METABOLIC PANEL: CPT | Performed by: OBSTETRICS & GYNECOLOGY

## 2023-10-21 PROCEDURE — 250N000011 HC RX IP 250 OP 636: Performed by: OBSTETRICS & GYNECOLOGY

## 2023-10-21 PROCEDURE — 250N000013 HC RX MED GY IP 250 OP 250 PS 637: Performed by: OBSTETRICS & GYNECOLOGY

## 2023-10-21 PROCEDURE — 258N000003 HC RX IP 258 OP 636: Performed by: OBSTETRICS & GYNECOLOGY

## 2023-10-21 RX ORDER — NALBUPHINE HYDROCHLORIDE 20 MG/ML
INJECTION, SOLUTION INTRAMUSCULAR; INTRAVENOUS; SUBCUTANEOUS
Status: COMPLETED
Start: 2023-10-21 | End: 2023-10-21

## 2023-10-21 RX ORDER — NALBUPHINE HYDROCHLORIDE 20 MG/ML
5 INJECTION, SOLUTION INTRAMUSCULAR; INTRAVENOUS; SUBCUTANEOUS ONCE
Status: COMPLETED | OUTPATIENT
Start: 2023-10-21 | End: 2023-10-21

## 2023-10-21 RX ORDER — NIFEDIPINE 30 MG
30 TABLET, EXTENDED RELEASE ORAL DAILY
Status: DISCONTINUED | OUTPATIENT
Start: 2023-10-21 | End: 2023-10-22

## 2023-10-21 RX ADMIN — IBUPROFEN 800 MG: 800 TABLET ORAL at 10:52

## 2023-10-21 RX ADMIN — NIFEDIPINE 30 MG: 30 TABLET, EXTENDED RELEASE ORAL at 05:19

## 2023-10-21 RX ADMIN — ACETAMINOPHEN 975 MG: 325 TABLET ORAL at 00:56

## 2023-10-21 RX ADMIN — NALBUPHINE HYDROCHLORIDE 5 MG: 20 INJECTION, SOLUTION INTRAMUSCULAR; INTRAVENOUS; SUBCUTANEOUS at 14:50

## 2023-10-21 RX ADMIN — KETOROLAC TROMETHAMINE 30 MG: 30 INJECTION, SOLUTION INTRAMUSCULAR; INTRAVENOUS at 03:50

## 2023-10-21 RX ADMIN — METHYLERGONOVINE 200 MCG: 0.2 TABLET ORAL at 03:50

## 2023-10-21 RX ADMIN — DIPHENHYDRAMINE HYDROCHLORIDE 25 MG: 25 CAPSULE ORAL at 08:39

## 2023-10-21 RX ADMIN — SODIUM CHLORIDE, POTASSIUM CHLORIDE, SODIUM LACTATE AND CALCIUM CHLORIDE 500 ML: 600; 310; 30; 20 INJECTION, SOLUTION INTRAVENOUS at 05:06

## 2023-10-21 RX ADMIN — IBUPROFEN 800 MG: 800 TABLET ORAL at 17:12

## 2023-10-21 RX ADMIN — ACETAMINOPHEN 975 MG: 325 TABLET ORAL at 06:47

## 2023-10-21 RX ADMIN — SENNOSIDES AND DOCUSATE SODIUM 1 TABLET: 8.6; 5 TABLET ORAL at 09:13

## 2023-10-21 RX ADMIN — SODIUM CHLORIDE, SODIUM LACTATE, POTASSIUM CHLORIDE, CALCIUM CHLORIDE AND DEXTROSE MONOHYDRATE: 5; 600; 310; 30; 20 INJECTION, SOLUTION INTRAVENOUS at 04:20

## 2023-10-21 RX ADMIN — SENNOSIDES AND DOCUSATE SODIUM 1 TABLET: 8.6; 5 TABLET ORAL at 21:01

## 2023-10-21 RX ADMIN — ACETAMINOPHEN 975 MG: 325 TABLET ORAL at 13:31

## 2023-10-21 ASSESSMENT — ACTIVITIES OF DAILY LIVING (ADL)
ADLS_ACUITY_SCORE: 24
ADLS_ACUITY_SCORE: 24
ADLS_ACUITY_SCORE: 20
ADLS_ACUITY_SCORE: 24
ADLS_ACUITY_SCORE: 20
ADLS_ACUITY_SCORE: 20
ADLS_ACUITY_SCORE: 24
ADLS_ACUITY_SCORE: 20

## 2023-10-21 NOTE — PROVIDER NOTIFICATION
10/21/23 0458   Provider Notification   Provider Name/Title stepan   Method of Notification Phone   Request Evaluate-Remote   Notification Reason Status Update     Updated MD that in the last 8 hours, urine output was 28 ml/hr. Patient ambulated to bathroom but did not feel steady and would like to keep ricketts in.     Verbal Order from MD: CBC and CMP STAT  Start Procardia XL 30 mg now  Discontinue Methergine Now  Give 500 ML LR bolus now then continue 125 ml/hr of D5LR

## 2023-10-21 NOTE — PROGRESS NOTES
Catheter removed at 1305, 2200 ml noted. Pt up to bathroom for clean up, linen changed. Pt stable, denies dizziness. Was able to wash face and brush teeth.

## 2023-10-21 NOTE — PROVIDER NOTIFICATION
10/20/23 2012   Provider Notification   Provider Name/Title Carolina Padgett MD   Method of Notification Phone   Request Evaluate-Remote   Notification Reason Other     Attempted to get patient up to change pad and cleanse perineal. Patient was still dizzy and slight nauseous. PRN medication given prior did help with nausea. Per MD, OK to leave ricketts overnight and will reassess in the morning.

## 2023-10-21 NOTE — PLAN OF CARE
Problem: Adult Inpatient Plan of Care  Goal: Plan of Care Review  Description: The Plan of Care Review/Shift note should be completed every shift.  The Outcome Evaluation is a brief statement about your assessment that the patient is improving, declining, or no change.  This information will be displayed automatically on your shift  note.  Outcome: Progressing     Problem: Postpartum ( Delivery)  Goal: Hemostasis  Outcome: Progressing     Problem: Postpartum ( Delivery)  Goal: Optimal Pain Control and Function  Outcome: Progressing  Intervention: Prevent or Manage Pain  Recent Flowsheet Documentation  Taken 10/21/2023 0414 by Megha Shah RN  Perineal Care:   absorbent brief/pad changed   perineum cleansed     Problem: Postpartum ( Delivery)  Goal: Effective Urinary Elimination  Outcome: Progressing   Goal Outcome Evaluation:    Patient's vital signs are WNL.   Bleeding is stable.   Denies of any headaches, vision changes, or pain in upper right abdomen. Patient had a slight headache but has now resolved.   Pain is tolerable with scheduled tylenol and Toradol.  Significant other left to work, daughter will be coming in several hours to help mother care for newborns till significant other returns from work. Newborns were brought to nursery room as mother is not able to care for newborns by herself at 0540.   Patient states that she is very tired.   Attempted to take ricketts out around 0400 but patient did not feel steady and still had dizziness.

## 2023-10-21 NOTE — PROGRESS NOTES
Postpartum Day 1    Patient Name:  Amy Clemens  :  1980  MRN:  [id number]      Assessment:  Normal postpartum course.  Elevated blood pressures on admission, started on procardia by Dr Hogan    Plan:  Continue current care.  Remove ricketts  Ambulate  Dr Hogan to manage BP medication  Discontinue IV    Subjective:  The patient feels better today. Was very dizzy yesterday when trying to ambulate.  Today she got up around 0500 with assistance and felt better. Ricketts still in place as she was unable to ambulate. Had decreased uop during the night. It is now adequate. lochia normal, tolerating normal diet, Is not yet passing flatus.  Pain is well controlled with current medications.  The patient has no emotional concerns.  The baby is well and being fed by both breast and bottle. Babies have been in the nursery as pt does not have family with her this am and she cannot care for them without assistance.     Birth date:      Prasanna Clemens [[id number]]   10/20/2024      Fortino Clemens [[id number]]   10/20/2024   Birth Time:      Prasanna Clemens [[id number]]   1:46 PM      Fortino Clemens [[id number]]   1:48 PM     Prenatal complications:  AMA age 43  Twins gestation with severe FGR baby A    Objective:  /56 (BP Location: Right arm)   Pulse 62   Temp 98.1  F (36.7  C) (Oral)   Resp 16   SpO2 97%   Breastfeeding Unknown   Patient Vitals for the past 24 hrs:   BP Temp Temp src Pulse Resp SpO2   10/21/23 0821 110/56 98.1  F (36.7  C) Oral 62 16 97 %   10/21/23 0500 -- -- -- -- -- 99 %   10/21/23 0430 -- -- -- -- -- 98 %   10/21/23 0345 125/62 98  F (36.7  C) Oral -- 18 96 %   10/21/23 0300 -- -- -- -- -- 96 %   10/21/23 0200 -- -- -- -- -- 97 %   10/21/23 0045 122/60 98  F (36.7  C) Oral -- 20 97 %   10/21/23 0000 -- -- -- -- -- 96 %   10/20/23 2300 -- -- -- -- -- 97 %   10/20/23 2200 -- -- -- -- -- 96 %   10/20/23 2132 -- 97.9  F (36.6  C) Oral -- 18 --   10/20/23 2130  132/61 -- -- -- -- 95 %   10/20/23 2030 121/66 98.3  F (36.8  C) Oral -- 17 97 %   10/20/23 1930 131/65 97.4  F (36.3  C) Oral -- 16 96 %   10/20/23 1830 137/73 -- -- -- 16 98 %   10/20/23 1725 (!) 149/64 -- -- -- 16 --   10/20/23 1700 (!) 143/70 -- -- -- 16 --   10/20/23 1625 (!) 150/85 -- -- -- 16 --   10/20/23 1610 (!) 145/84 -- -- -- 16 --   10/20/23 1555 (!) 143/79 -- -- -- 16 --   10/20/23 1540 135/75 -- -- -- 16 --   10/20/23 1523 136/77 -- -- -- -- --   10/20/23 1508 137/81 -- -- -- -- --   10/20/23 1453 134/80 -- -- -- -- --   10/20/23 1438 125/66 -- -- -- -- --   10/20/23 1203 (!) 140/71 -- -- -- -- --   10/20/23 1200 -- 98.3  F (36.8  C) Oral -- 16 --       Exam: Patient A&O x 3. No acute distress, breathing unlabored. The amount and color of the lochia is appropriate for the duration of recovery. Uterine fundus is firm at U-1. Urinary output is adequate. The low transverse sutured incision is clean, dry and intact.    Lab Results   Component Value Date    ABO O 04/10/2020    RH Pos 04/10/2020    AS Negative 10/20/2023    CHPCRT Not Detected 08/19/2020    GCPCRT Not Detected 08/19/2020    HGB 11.7 10/21/2023       Immunization History   Administered Date(s) Administered    COVID-19 Monovalent 18+ (Moderna) 04/16/2021, 05/13/2021       Provider: Jacque Larsen CNM    Date:  10/21/2023  Time:  10:19 AM

## 2023-10-21 NOTE — DISCHARGE INSTRUCTIONS
Warning Signs after Having a Baby    Keep this paper on your fridge or somewhere else where you can see it.    Call your provider if you have any of these symptoms up to 12 weeks after having your baby.    Thoughts of hurting yourself or your baby  Pain in your chest or trouble breathing  Severe headache not helped by pain medicine  Eyesight concerns (blurry vision, seeing spots or flashes of light, other changes to eyesight)  Fainting, shaking or other signs of a seizure    Call 9-1-1 if you feel that it is an emergency.     The symptoms below can happen to anyone after giving birth. They can be very serious. Call your provider if you have any of these warning signs.    My provider s phone number: _______________________    Losing too much blood (hemorrhage)    Call your provider if you soak through a pad in less than an hour or pass blood clots bigger than a golf ball. These may be signs that you are bleeding too much.    Blood clots in the legs or lungs    After you give birth, your body naturally clots its blood to help prevent blood loss. Sometimes this increased clotting can happen in other areas of the body, like the legs or lungs. This can block your blood flow and be very dangerous.     Call your provider if you:  Have a red, swollen spot on the back of your leg that is warm or painful when you touch it.   Are coughing up blood.     Infection    Call your provider if you have any of these symptoms:  Fever of 100.4 F (38 C) or higher.  Pain or redness around your stitches if you had an incision.   Any yellow, white, or green fluid coming from places where you had stitches or surgery.    Mood Problems (postpartum depression)    Many people feel sad or have mood changes after having a baby. But for some people, these mood swings are worse.     Call your provider right away if you feel so anxious or nervous that you can't care for yourself or your baby.    Preeclampsia (high blood pressure)    Even if you  didn't have high blood pressure when you were pregnant, you are at risk for the high blood pressure disease called preeclampsia. This risk can last up to 12 weeks after giving birth.     Call your provider if you have:   Pain on your right side under your rib cage  Sudden swelling in the hands and face    Remember: You know your body. If something doesn't feel right, get medical help.     For informational purposes only. Not to replace the advice of your health care provider. Copyright 2020 [facility], [place]. All rights reserved. Clinically reviewed by Nickie oCrbett, RNC-OB, MSN. [employer] [id number] - Rev 02/23.

## 2023-10-21 NOTE — PROVIDER NOTIFICATION
10/20/23 0950   Provider Notification   Provider Name/Title Naseemgarrett   Method of Notification Phone   Request Evaluate-Remote   Notification Reason Medication Request     MD notified patient complaining of itchiness at incision site and requesting PRN medication. MD states ok for Benadryl 25 mg PO BID PRN.     MD also notified of elevated BP from 1343-5163 systolic elevated ranging from 140's-150's. BP starting at 1830 has been below 140's systolic. MD states will run some labs in the morning and possibly start blood pressure medications.

## 2023-10-22 VITALS
OXYGEN SATURATION: 100 % | RESPIRATION RATE: 17 BRPM | DIASTOLIC BLOOD PRESSURE: 60 MMHG | SYSTOLIC BLOOD PRESSURE: 127 MMHG | BODY MASS INDEX: 39.32 KG/M2 | TEMPERATURE: 98.1 F | WEIGHT: 215 LBS | HEART RATE: 89 BPM

## 2023-10-22 PROBLEM — O13.9 GESTATIONAL HYPERTENSION: Status: ACTIVE | Noted: 2023-10-22

## 2023-10-22 PROCEDURE — 250N000013 HC RX MED GY IP 250 OP 250 PS 637: Performed by: OBSTETRICS & GYNECOLOGY

## 2023-10-22 RX ORDER — IBUPROFEN 800 MG/1
800 TABLET, FILM COATED ORAL EVERY 8 HOURS PRN
Qty: 20 TABLET | Refills: 0 | Status: SHIPPED | OUTPATIENT
Start: 2023-10-22

## 2023-10-22 RX ORDER — ACETAMINOPHEN 325 MG/1
975 TABLET ORAL EVERY 6 HOURS
Qty: 30 TABLET | Refills: 0 | Status: SHIPPED | OUTPATIENT
Start: 2023-10-22

## 2023-10-22 RX ORDER — OXYCODONE HYDROCHLORIDE 5 MG/1
5 TABLET ORAL EVERY 6 HOURS PRN
Qty: 12 TABLET | Refills: 0 | Status: SHIPPED | OUTPATIENT
Start: 2023-10-22

## 2023-10-22 RX ORDER — AMOXICILLIN 250 MG
1 CAPSULE ORAL DAILY PRN
Qty: 30 TABLET | Refills: 0 | Status: SHIPPED | OUTPATIENT
Start: 2023-10-22

## 2023-10-22 RX ADMIN — ACETAMINOPHEN 975 MG: 325 TABLET ORAL at 12:23

## 2023-10-22 RX ADMIN — OXYCODONE HYDROCHLORIDE 5 MG: 5 TABLET ORAL at 06:40

## 2023-10-22 RX ADMIN — OXYCODONE HYDROCHLORIDE 5 MG: 5 TABLET ORAL at 11:04

## 2023-10-22 RX ADMIN — IBUPROFEN 800 MG: 800 TABLET ORAL at 12:23

## 2023-10-22 RX ADMIN — ACETAMINOPHEN 975 MG: 325 TABLET ORAL at 06:40

## 2023-10-22 RX ADMIN — SENNOSIDES AND DOCUSATE SODIUM 2 TABLET: 50; 8.6 TABLET ORAL at 11:04

## 2023-10-22 RX ADMIN — ACETAMINOPHEN 975 MG: 325 TABLET ORAL at 01:02

## 2023-10-22 RX ADMIN — OXYCODONE HYDROCHLORIDE 5 MG: 5 TABLET ORAL at 03:17

## 2023-10-22 RX ADMIN — IBUPROFEN 800 MG: 800 TABLET ORAL at 06:40

## 2023-10-22 RX ADMIN — IBUPROFEN 800 MG: 800 TABLET ORAL at 01:02

## 2023-10-22 ASSESSMENT — ACTIVITIES OF DAILY LIVING (ADL)
ADLS_ACUITY_SCORE: 20

## 2023-10-22 NOTE — PROGRESS NOTES
Postpartum Day 2    Patient Name:  Amy Clemens  :  1980  MRN:  [id number]      Assessment:  Normal postpartum course.    Plan:  Continue current care.  Discharge home today per patient preference.  Will send home with a blood pressure cuff due to elevations in the immediate postpartum (likely due to methergine administration) and will have her return Friday in clinic for a blood pressure check.    Subjective:  The patient feels well:  Voiding without difficulty, lochia normal, tolerating normal diet, and passing flatus.  She denies headache, vision changes, URQ/epigastric pain, dizziness, lightheadedness, shortness of breath, and tachycardia. Pain is well controlled with current medications.  The patient has no emotional concerns.  The babies are  well and discharged to home.    Birth date:      Prasanna Clemens [[id number]]   10/20/2024      Fortino Clemens [[id number]]   10/20/2024     Birth Time:      Prasanna Clemens [[id number]]   1:46 PM      Fortino Clemens [[id number]]   1:48 PM     Prenatal Complications:   AMA age 43  Twins gestation with severe FGR baby A    Objective:  /60 (BP Location: Left arm, Patient Position: Sitting, Cuff Size: Adult Regular)   Pulse 89   Temp 98.1  F (36.7  C) (Oral)   Resp 17   SpO2 100%   Breastfeeding Unknown   Patient Vitals for the past 24 hrs:   BP Temp Temp src Pulse Resp SpO2   10/22/23 0815 127/60 98.1  F (36.7  C) Oral 89 17 100 %   10/22/23 0500 103/61 -- -- -- -- --   10/22/23 0102 115/58 97.8  F (36.6  C) Oral 85 18 98 %   10/21/23 2050 105/50 98.4  F (36.9  C) Oral 78 18 97 %   10/21/23 1645 115/70 98  F (36.7  C) Oral 87 -- 98 %   10/21/23 1400 104/46 98.1  F (36.7  C) Oral 73 18 98 %       Exam: Patient A&O x 3. No acute distress, breathing unlabored. The amount and color of the lochia is appropriate for the duration of recovery. The patient is ambulating well without assistance.  The patient is tolerating a  regular diet.    Lab Results   Component Value Date    ABO O 04/10/2020    RH Pos 04/10/2020    AS Negative 10/20/2023    CHPCRT Not Detected 08/19/2020    GCPCRT Not Detected 08/19/2020    HGB 11.7 10/21/2023       Immunization History   Administered Date(s) Administered    COVID-19 Monovalent 18+ (Moderna) 04/16/2021, 05/13/2021         Provider:  KYRIE Franco CNM    Date:  10/22/2023  Time:  12:46 PM

## 2023-10-22 NOTE — PLAN OF CARE
Pt is managing incisional pain with tylenol, ibuprofen, and oxy.   Also applying ice to incision site.  Postpartum assessment WNL and VSS.  Pt has been normotensive and nifedipine discontinued per provider orders.   Ambulating and voiding w/o difficulty.  Significant other is in room and supportive with baby cares.  Needs to complete mood assessment and birth certificate.        Problem: Adult Inpatient Plan of Care  Goal: Optimal Comfort and Wellbeing  Outcome: Progressing  Intervention: Monitor Pain and Promote Comfort  Recent Flowsheet Documentation  Taken 10/22/2023 0102 by Jodi Garcia RN  Pain Management Interventions: medication (see MAR)  Intervention: Provide Person-Centered Care  Recent Flowsheet Documentation  Taken 10/22/2023 010 by Jodi Garcia RN  Trust Relationship/Rapport:   care explained   choices provided   questions answered   questions encouraged   thoughts/feelings acknowledged  Taken 10/21/2023 2050 by Jodi Garcia RN  Trust Relationship/Rapport:   care explained   choices provided   questions answered   questions encouraged   thoughts/feelings acknowledged     Problem: Postpartum ( Delivery)  Goal: Fluid and Electrolyte Balance  Outcome: Progressing     Problem: Postpartum ( Delivery)  Goal: Successful Parent Role Transition  Outcome: Progressing

## 2023-10-22 NOTE — PLAN OF CARE
Goal Outcome Evaluation: Met    Patient meets criteria for discharge. Patient vitals and BP wnl. Nifedipine discontinued. Patient discharged with a BP cuff and education done on preeclampsia signs. Education and AVS completed.

## 2023-10-22 NOTE — DISCHARGE SUMMARY
OB Discharge Summary      Date:  10/22/2023    Name:  Amy Clemens  :  1980  MRN:  [id number]      Admission Date:  10/20/2023  Delivery Date:      Sarath Female-DONNA Reyes [[id number]]   10/20/2023      Sarath Female-B Amy [[id number]]   10/20/2023   Gestational Age at Delivery:  37w4d  Discharge Date:  10/22/2023    Principal Diagnosis:    Patient Active Problem List   Diagnosis    Tubal occlusion     delivery delivered    Gestational hypertension         Conditions complicating Pregnancy:   AMA age 43  Twins gestation with severe FGR baby A  IVF pregnancy with donor egg  Gestational hypertension diagnosed on admission    Procedure(s) Performed:  Spinal, Low transverse  section, TAP blocks    Indication for :  Di Di twin gestation with desire for  section  Indication for Induction:  None     Condition at Discharge:  Good    Discharge Medications:      Review of your medicines        START taking        Dose / Directions   ibuprofen 800 MG tablet  Commonly known as: ADVIL/MOTRIN      Dose: 800 mg  Take 1 tablet (800 mg) by mouth every 8 hours as needed for moderate pain  Quantity: 20 tablet  Refills: 0     oxyCODONE 5 MG tablet  Commonly known as: ROXICODONE      Dose: 5 mg  Take 1 tablet (5 mg) by mouth every 6 hours as needed for breakthrough pain  Quantity: 12 tablet  Refills: 0     senna-docusate 8.6-50 MG tablet  Commonly known as: SENOKOT-S/PERICOLACE      Dose: 1 tablet  Take 1 tablet by mouth daily as needed for constipation  Quantity: 30 tablet  Refills: 0            CONTINUE these medicines which may have CHANGED, or have new prescriptions. If we are uncertain of the size of tablets/capsules you have at home, strength may be listed as something that might have changed.        Dose / Directions   acetaminophen 325 MG tablet  Commonly known as: TYLENOL  This may have changed:   medication strength  how much to take  when to take this  reasons to take this      Dose:  975 mg  Take 3 tablets (975 mg) by mouth every 6 hours  Quantity: 30 tablet  Refills: 0            CONTINUE these medicines which have NOT CHANGED        Dose / Directions   levothyroxine 50 MCG tablet  Commonly known as: SYNTHROID/LEVOTHROID      Dose: 50 mcg  Take 50 mcg by mouth daily  Refills: 0     metFORMIN 500 MG 24 hr tablet  Commonly known as: GLUCOPHAGE XR      Dose: 500 mg  500 mg  Refills: 0     pantoprazole 20 MG EC tablet  Commonly known as: PROTONIX      Dose: 40 mg  Take 40 mg by mouth daily  Refills: 0     prenatal multivitamin w/iron 27-0.8 MG tablet      Dose: 1 tablet  Take 1 tablet by mouth daily  Refills: 0     Vitamin D3 125 MCG (5000 UT) tablet  Commonly known as: CHOLECALCIFEROL      Dose: 1 tablet  Take 1 tablet by mouth daily  Refills: 0               Where to get your medicines        Some of these will need a paper prescription and others can be bought over the counter. Ask your nurse if you have questions.    Bring a paper prescription for each of these medications  acetaminophen 325 MG tablet  ibuprofen 800 MG tablet  oxyCODONE 5 MG tablet  senna-docusate 8.6-50 MG tablet          Discharge Plan:    Follow up with /CNM:  [facility] on Friday for a blood pressure check, in 2 weeks for an incision check, and in 6 weeks for a postpartum visit   Patient Instructions:      Physical activity: No driving while on narctoics. No lifting anything greater than 15lbs for 6 weeks. Nothing in the vagina for 6 weeks.     Diet:  Regular.     Medication: As listed above. May alternate ibuprofen and acetaminophen for pain management.     Other:        Physician/CNM: KYRIE Franco CNM    Name:  Amy Clemens  :  1980  MRN:  [id number]

## 2023-10-24 LAB
PATH REPORT.COMMENTS IMP SPEC: NORMAL
PATH REPORT.COMMENTS IMP SPEC: NORMAL
PATH REPORT.FINAL DX SPEC: NORMAL
PATH REPORT.GROSS SPEC: NORMAL
PATH REPORT.MICROSCOPIC SPEC OTHER STN: NORMAL
PATH REPORT.RELEVANT HX SPEC: NORMAL
PHOTO IMAGE: NORMAL

## 2023-10-31 ENCOUNTER — HOSPITAL ENCOUNTER (OUTPATIENT)
Facility: CLINIC | Age: 43
Discharge: HOME | End: 2023-10-31
Attending: EMERGENCY MEDICINE | Admitting: FAMILY MEDICINE
Payer: COMMERCIAL

## 2023-10-31 ENCOUNTER — HOSPITAL ENCOUNTER (OUTPATIENT)
Facility: CLINIC | Age: 43
End: 2023-10-31
Attending: FAMILY MEDICINE | Admitting: FAMILY MEDICINE
Payer: COMMERCIAL

## 2023-10-31 ENCOUNTER — APPOINTMENT (OUTPATIENT)
Dept: ULTRASOUND IMAGING | Facility: CLINIC | Age: 43
End: 2023-10-31
Attending: FAMILY MEDICINE
Payer: COMMERCIAL

## 2023-10-31 ENCOUNTER — ANESTHESIA (OUTPATIENT)
Dept: SURGERY | Facility: CLINIC | Age: 43
End: 2023-10-31
Payer: COMMERCIAL

## 2023-10-31 ENCOUNTER — HOSPITAL ENCOUNTER (OUTPATIENT)
Facility: CLINIC | Age: 43
End: 2023-10-31
Admitting: FAMILY MEDICINE
Payer: COMMERCIAL

## 2023-10-31 ENCOUNTER — ANESTHESIA EVENT (OUTPATIENT)
Dept: SURGERY | Facility: CLINIC | Age: 43
End: 2023-10-31
Payer: COMMERCIAL

## 2023-10-31 VITALS
OXYGEN SATURATION: 98 % | SYSTOLIC BLOOD PRESSURE: 114 MMHG | DIASTOLIC BLOOD PRESSURE: 54 MMHG | HEART RATE: 99 BPM | RESPIRATION RATE: 16 BRPM | TEMPERATURE: 98.6 F

## 2023-10-31 DIAGNOSIS — Z98.890 S/P DILATION AND CURETTAGE: Primary | ICD-10-CM

## 2023-10-31 LAB
ABO/RH(D): NORMAL
ALBUMIN SERPL BCG-MCNC: 2.4 G/DL (ref 3.5–5.2)
ALP SERPL-CCNC: 80 U/L (ref 35–104)
ALT SERPL W P-5'-P-CCNC: 17 U/L (ref 0–50)
ANION GAP BLD CALCULATED.3IONS-SCNC: 17 MMOL/L (ref 3–14)
ANION GAP SERPL CALCULATED.3IONS-SCNC: 6 MMOL/L (ref 7–15)
ANTIBODY SCREEN: NEGATIVE
APTT PPP: 35 SECONDS (ref 22–38)
AST SERPL W P-5'-P-CCNC: 12 U/L (ref 0–45)
BASOPHILS # BLD AUTO: 0 10E3/UL (ref 0–0.2)
BASOPHILS NFR BLD AUTO: 0 %
BILIRUB SERPL-MCNC: <0.2 MG/DL
BLD PROD TYP BPU: NORMAL
BLOOD COMPONENT TYPE: NORMAL
BUN SERPL-MCNC: 19 MG/DL (ref 7–30)
BUN SERPL-MCNC: 21 MG/DL (ref 6–20)
CA-I BLD-MCNC: 4.6 MG/DL (ref 4.4–5.2)
CALCIUM SERPL-MCNC: 7.2 MG/DL (ref 8.6–10)
CHLORIDE BLD-SCNC: 109 MMOL/L (ref 94–109)
CHLORIDE SERPL-SCNC: 110 MMOL/L (ref 98–107)
CO2 BLD-SCNC: 20 MMOL/L (ref 20–32)
CODING SYSTEM: NORMAL
CREAT BLD-MCNC: 0.7 MG/DL (ref 0.5–1)
CREAT SERPL-MCNC: 0.7 MG/DL (ref 0.51–0.95)
CROSSMATCH: NORMAL
DEPRECATED HCO3 PLAS-SCNC: 23 MMOL/L (ref 22–29)
EGFRCR SERPLBLD CKD-EPI 2021: >90 ML/MIN/1.73M2
EOSINOPHIL # BLD AUTO: 0.1 10E3/UL (ref 0–0.7)
EOSINOPHIL NFR BLD AUTO: 1 %
ERYTHROCYTE [DISTWIDTH] IN BLOOD BY AUTOMATED COUNT: 13.3 % (ref 10–15)
GLUCOSE BLD-MCNC: 185 MG/DL (ref 70–99)
GLUCOSE BLDC GLUCOMTR-MCNC: 169 MG/DL (ref 70–99)
GLUCOSE SERPL-MCNC: 193 MG/DL (ref 70–99)
HCG INTACT+B SERPL-ACNC: 22 MIU/ML
HCO3 BLDV-SCNC: 20 MMOL/L (ref 21–28)
HCT VFR BLD AUTO: 24.2 % (ref 35–47)
HCT VFR BLD CALC: 21 % (ref 35–47)
HGB BLD-MCNC: 7.1 G/DL (ref 11.7–15.7)
HGB BLD-MCNC: 7.2 G/DL (ref 11.7–15.7)
HGB BLD-MCNC: 7.7 G/DL (ref 11.7–15.7)
HGB BLD-MCNC: 8 G/DL (ref 11.7–15.7)
IMM GRANULOCYTES # BLD: 0.1 10E3/UL
IMM GRANULOCYTES NFR BLD: 1 %
INR PPP: 1.21 (ref 0.85–1.15)
ISSUE DATE AND TIME: NORMAL
LACTATE BLD-SCNC: 1.6 MMOL/L
LYMPHOCYTES # BLD AUTO: 1.3 10E3/UL (ref 0.8–5.3)
LYMPHOCYTES NFR BLD AUTO: 12 %
MCH RBC QN AUTO: 30.9 PG (ref 26.5–33)
MCHC RBC AUTO-ENTMCNC: 31.8 G/DL (ref 31.5–36.5)
MCV RBC AUTO: 97 FL (ref 78–100)
MONOCYTES # BLD AUTO: 0.5 10E3/UL (ref 0–1.3)
MONOCYTES NFR BLD AUTO: 5 %
NEUTROPHILS # BLD AUTO: 8.6 10E3/UL (ref 1.6–8.3)
NEUTROPHILS NFR BLD AUTO: 81 %
NRBC # BLD AUTO: 0 10E3/UL
NRBC BLD AUTO-RTO: 0 /100
PCO2 BLDV: 42 MM HG (ref 40–50)
PH BLDV: 7.3 [PH] (ref 7.32–7.43)
PLATELET # BLD AUTO: 205 10E3/UL (ref 150–450)
PO2 BLDV: 24 MM HG (ref 25–47)
POTASSIUM BLD-SCNC: 3.8 MMOL/L (ref 3.4–5.3)
POTASSIUM SERPL-SCNC: 4 MMOL/L (ref 3.4–5.3)
PROT SERPL-MCNC: 4.3 G/DL (ref 6.4–8.3)
RBC # BLD AUTO: 2.49 10E6/UL (ref 3.8–5.2)
SAO2 % BLDV: 37 % (ref 94–100)
SODIUM BLD-SCNC: 141 MMOL/L (ref 133–144)
SODIUM SERPL-SCNC: 139 MMOL/L (ref 135–145)
SPECIMEN EXPIRATION DATE: NORMAL
UNIT ABO/RH: NORMAL
UNIT NUMBER: NORMAL
UNIT STATUS: NORMAL
UNIT TYPE ISBT: 5100
UNIT TYPE ISBT: 6200
UNIT TYPE ISBT: 6200
UNIT TYPE ISBT: 9500
WBC # BLD AUTO: 10.7 10E3/UL (ref 4–11)

## 2023-10-31 PROCEDURE — 85025 COMPLETE CBC W/AUTO DIFF WBC: CPT | Performed by: EMERGENCY MEDICINE

## 2023-10-31 PROCEDURE — 258N000003 HC RX IP 258 OP 636: Performed by: ANESTHESIOLOGY

## 2023-10-31 PROCEDURE — 250N000011 HC RX IP 250 OP 636: Mod: JZ

## 2023-10-31 PROCEDURE — P9016 RBC LEUKOCYTES REDUCED: HCPCS | Performed by: EMERGENCY MEDICINE

## 2023-10-31 PROCEDURE — 59160 D & C AFTER DELIVERY: CPT | Performed by: FAMILY MEDICINE

## 2023-10-31 PROCEDURE — 250N000009 HC RX 250: Performed by: EMERGENCY MEDICINE

## 2023-10-31 PROCEDURE — 258N000003 HC RX IP 258 OP 636: Performed by: EMERGENCY MEDICINE

## 2023-10-31 PROCEDURE — 258N000003 HC RX IP 258 OP 636: Performed by: FAMILY MEDICINE

## 2023-10-31 PROCEDURE — 99291 CRITICAL CARE FIRST HOUR: CPT | Mod: 25

## 2023-10-31 PROCEDURE — 999N000141 HC STATISTIC PRE-PROCEDURE NURSING ASSESSMENT: Performed by: FAMILY MEDICINE

## 2023-10-31 PROCEDURE — 99214 OFFICE O/P EST MOD 30 MIN: CPT | Mod: 25 | Performed by: FAMILY MEDICINE

## 2023-10-31 PROCEDURE — 36415 COLL VENOUS BLD VENIPUNCTURE: CPT | Performed by: FAMILY MEDICINE

## 2023-10-31 PROCEDURE — 96375 TX/PRO/DX INJ NEW DRUG ADDON: CPT | Mod: 59

## 2023-10-31 PROCEDURE — 59899 UNLISTED PX MAT CARE&DLVR: CPT | Performed by: FAMILY MEDICINE

## 2023-10-31 PROCEDURE — P9059 PLASMA, FRZ BETWEEN 8-24HOUR: HCPCS | Performed by: EMERGENCY MEDICINE

## 2023-10-31 PROCEDURE — 250N000013 HC RX MED GY IP 250 OP 250 PS 637: Performed by: FAMILY MEDICINE

## 2023-10-31 PROCEDURE — 360N000075 HC SURGERY LEVEL 2, PER MIN: Performed by: FAMILY MEDICINE

## 2023-10-31 PROCEDURE — 82962 GLUCOSE BLOOD TEST: CPT

## 2023-10-31 PROCEDURE — 272N000001 HC OR GENERAL SUPPLY STERILE: Performed by: FAMILY MEDICINE

## 2023-10-31 PROCEDURE — 86923 COMPATIBILITY TEST ELECTRIC: CPT | Performed by: EMERGENCY MEDICINE

## 2023-10-31 PROCEDURE — P9016 RBC LEUKOCYTES REDUCED: HCPCS

## 2023-10-31 PROCEDURE — 86850 RBC ANTIBODY SCREEN: CPT | Performed by: EMERGENCY MEDICINE

## 2023-10-31 PROCEDURE — 250N000009 HC RX 250: Performed by: NURSE ANESTHETIST, CERTIFIED REGISTERED

## 2023-10-31 PROCEDURE — 83605 ASSAY OF LACTIC ACID: CPT

## 2023-10-31 PROCEDURE — 999N000063 US INTRAOPERATIVE: Mod: TC

## 2023-10-31 PROCEDURE — 710N000011 HC RECOVERY PHASE 1, LEVEL 3, PER MIN: Performed by: FAMILY MEDICINE

## 2023-10-31 PROCEDURE — P9037 PLATE PHERES LEUKOREDU IRRAD: HCPCS | Performed by: EMERGENCY MEDICINE

## 2023-10-31 PROCEDURE — 250N000011 HC RX IP 250 OP 636: Performed by: EMERGENCY MEDICINE

## 2023-10-31 PROCEDURE — 370N000017 HC ANESTHESIA TECHNICAL FEE, PER MIN: Performed by: FAMILY MEDICINE

## 2023-10-31 PROCEDURE — 250N000013 HC RX MED GY IP 250 OP 250 PS 637: Performed by: EMERGENCY MEDICINE

## 2023-10-31 PROCEDURE — 85018 HEMOGLOBIN: CPT | Mod: 91 | Performed by: ANESTHESIOLOGY

## 2023-10-31 PROCEDURE — 85730 THROMBOPLASTIN TIME PARTIAL: CPT | Performed by: EMERGENCY MEDICINE

## 2023-10-31 PROCEDURE — 36415 COLL VENOUS BLD VENIPUNCTURE: CPT | Performed by: EMERGENCY MEDICINE

## 2023-10-31 PROCEDURE — 36415 COLL VENOUS BLD VENIPUNCTURE: CPT | Performed by: ANESTHESIOLOGY

## 2023-10-31 PROCEDURE — 80047 BASIC METABLC PNL IONIZED CA: CPT | Mod: XU

## 2023-10-31 PROCEDURE — 96376 TX/PRO/DX INJ SAME DRUG ADON: CPT | Mod: 59

## 2023-10-31 PROCEDURE — 76998 US GUIDE INTRAOP: CPT | Mod: 26 | Performed by: FAMILY MEDICINE

## 2023-10-31 PROCEDURE — 250N000011 HC RX IP 250 OP 636: Performed by: NURSE ANESTHETIST, CERTIFIED REGISTERED

## 2023-10-31 PROCEDURE — 80053 COMPREHEN METABOLIC PANEL: CPT | Performed by: EMERGENCY MEDICINE

## 2023-10-31 PROCEDURE — 82803 BLOOD GASES ANY COMBINATION: CPT

## 2023-10-31 PROCEDURE — 250N000025 HC SEVOFLURANE, PER MIN: Performed by: FAMILY MEDICINE

## 2023-10-31 PROCEDURE — 258N000003 HC RX IP 258 OP 636: Performed by: NURSE ANESTHETIST, CERTIFIED REGISTERED

## 2023-10-31 PROCEDURE — 85610 PROTHROMBIN TIME: CPT | Performed by: EMERGENCY MEDICINE

## 2023-10-31 PROCEDURE — 96368 THER/DIAG CONCURRENT INF: CPT | Mod: 59

## 2023-10-31 PROCEDURE — 96365 THER/PROPH/DIAG IV INF INIT: CPT | Mod: 59

## 2023-10-31 PROCEDURE — 88305 TISSUE EXAM BY PATHOLOGIST: CPT | Mod: 26 | Performed by: PATHOLOGY

## 2023-10-31 PROCEDURE — 88305 TISSUE EXAM BY PATHOLOGIST: CPT | Mod: TC | Performed by: FAMILY MEDICINE

## 2023-10-31 PROCEDURE — 84702 CHORIONIC GONADOTROPIN TEST: CPT | Performed by: EMERGENCY MEDICINE

## 2023-10-31 PROCEDURE — 86901 BLOOD TYPING SEROLOGIC RH(D): CPT | Performed by: EMERGENCY MEDICINE

## 2023-10-31 PROCEDURE — 36430 TRANSFUSION BLD/BLD COMPNT: CPT

## 2023-10-31 PROCEDURE — 85018 HEMOGLOBIN: CPT | Performed by: FAMILY MEDICINE

## 2023-10-31 RX ORDER — BISACODYL 10 MG
10 SUPPOSITORY, RECTAL RECTAL DAILY PRN
Status: DISCONTINUED | OUTPATIENT
Start: 2023-10-31 | End: 2023-10-31 | Stop reason: HOSPADM

## 2023-10-31 RX ORDER — MISOPROSTOL 200 UG/1
800 TABLET ORAL ONCE
Status: COMPLETED | OUTPATIENT
Start: 2023-10-31 | End: 2023-10-31

## 2023-10-31 RX ORDER — SODIUM CHLORIDE, SODIUM LACTATE, POTASSIUM CHLORIDE, CALCIUM CHLORIDE 600; 310; 30; 20 MG/100ML; MG/100ML; MG/100ML; MG/100ML
INJECTION, SOLUTION INTRAVENOUS CONTINUOUS PRN
Status: DISCONTINUED | OUTPATIENT
Start: 2023-10-31 | End: 2023-10-31

## 2023-10-31 RX ORDER — FENTANYL CITRATE 50 UG/ML
25 INJECTION, SOLUTION INTRAMUSCULAR; INTRAVENOUS EVERY 5 MIN PRN
Status: DISCONTINUED | OUTPATIENT
Start: 2023-10-31 | End: 2023-10-31 | Stop reason: HOSPADM

## 2023-10-31 RX ORDER — AMOXICILLIN 250 MG
1 CAPSULE ORAL 2 TIMES DAILY
Status: DISCONTINUED | OUTPATIENT
Start: 2023-10-31 | End: 2023-10-31 | Stop reason: HOSPADM

## 2023-10-31 RX ORDER — TRANEXAMIC ACID 10 MG/ML
1 INJECTION, SOLUTION INTRAVENOUS ONCE
Status: COMPLETED | OUTPATIENT
Start: 2023-10-31 | End: 2023-10-31

## 2023-10-31 RX ORDER — HYDROMORPHONE HCL IN WATER/PF 6 MG/30 ML
0.4 PATIENT CONTROLLED ANALGESIA SYRINGE INTRAVENOUS
Status: DISCONTINUED | OUTPATIENT
Start: 2023-10-31 | End: 2023-10-31 | Stop reason: HOSPADM

## 2023-10-31 RX ORDER — HYDROXYZINE HYDROCHLORIDE 25 MG/1
25 TABLET, FILM COATED ORAL EVERY 6 HOURS PRN
Status: DISCONTINUED | OUTPATIENT
Start: 2023-10-31 | End: 2023-10-31 | Stop reason: HOSPADM

## 2023-10-31 RX ORDER — CEFAZOLIN SODIUM/WATER 2 G/20 ML
2 SYRINGE (ML) INTRAVENOUS
Status: DISCONTINUED | OUTPATIENT
Start: 2023-10-31 | End: 2023-10-31 | Stop reason: HOSPADM

## 2023-10-31 RX ORDER — ACETAMINOPHEN 325 MG/1
975 TABLET ORAL EVERY 6 HOURS
Status: DISCONTINUED | OUTPATIENT
Start: 2023-10-31 | End: 2023-10-31 | Stop reason: HOSPADM

## 2023-10-31 RX ORDER — MISOPROSTOL 200 UG/1
TABLET ORAL PRN
Status: DISCONTINUED | OUTPATIENT
Start: 2023-10-31 | End: 2023-10-31 | Stop reason: HOSPADM

## 2023-10-31 RX ORDER — ACETAMINOPHEN 325 MG/1
975 TABLET ORAL EVERY 6 HOURS PRN
Qty: 50 TABLET | Refills: 0 | Status: SHIPPED | OUTPATIENT
Start: 2023-10-31

## 2023-10-31 RX ORDER — ONDANSETRON 2 MG/ML
4 INJECTION INTRAMUSCULAR; INTRAVENOUS ONCE
Status: COMPLETED | OUTPATIENT
Start: 2023-10-31 | End: 2023-10-31

## 2023-10-31 RX ORDER — CEFAZOLIN SODIUM 1 G/3ML
INJECTION, POWDER, FOR SOLUTION INTRAMUSCULAR; INTRAVENOUS PRN
Status: DISCONTINUED | OUTPATIENT
Start: 2023-10-31 | End: 2023-10-31

## 2023-10-31 RX ORDER — CARBOPROST TROMETHAMINE 250 UG/ML
250 INJECTION, SOLUTION INTRAMUSCULAR
Status: DISCONTINUED | OUTPATIENT
Start: 2023-10-31 | End: 2023-10-31

## 2023-10-31 RX ORDER — HYDROMORPHONE HCL IN WATER/PF 6 MG/30 ML
0.2 PATIENT CONTROLLED ANALGESIA SYRINGE INTRAVENOUS EVERY 5 MIN PRN
Status: DISCONTINUED | OUTPATIENT
Start: 2023-10-31 | End: 2023-10-31 | Stop reason: HOSPADM

## 2023-10-31 RX ORDER — HYDROMORPHONE HCL IN WATER/PF 6 MG/30 ML
0.2 PATIENT CONTROLLED ANALGESIA SYRINGE INTRAVENOUS
Status: DISCONTINUED | OUTPATIENT
Start: 2023-10-31 | End: 2023-10-31 | Stop reason: HOSPADM

## 2023-10-31 RX ORDER — FENTANYL CITRATE 50 UG/ML
50 INJECTION, SOLUTION INTRAMUSCULAR; INTRAVENOUS EVERY 5 MIN PRN
Status: DISCONTINUED | OUTPATIENT
Start: 2023-10-31 | End: 2023-10-31 | Stop reason: HOSPADM

## 2023-10-31 RX ORDER — NALOXONE HYDROCHLORIDE 0.4 MG/ML
0.2 INJECTION, SOLUTION INTRAMUSCULAR; INTRAVENOUS; SUBCUTANEOUS
Status: DISCONTINUED | OUTPATIENT
Start: 2023-10-31 | End: 2023-10-31 | Stop reason: HOSPADM

## 2023-10-31 RX ORDER — ONDANSETRON 4 MG/1
4-8 TABLET, ORALLY DISINTEGRATING ORAL EVERY 8 HOURS PRN
Qty: 4 TABLET | Refills: 0 | Status: SHIPPED | OUTPATIENT
Start: 2023-10-31

## 2023-10-31 RX ORDER — NALOXONE HYDROCHLORIDE 0.4 MG/ML
0.4 INJECTION, SOLUTION INTRAMUSCULAR; INTRAVENOUS; SUBCUTANEOUS
Status: DISCONTINUED | OUTPATIENT
Start: 2023-10-31 | End: 2023-10-31 | Stop reason: HOSPADM

## 2023-10-31 RX ORDER — CALCIUM GLUCONATE 20 MG/ML
1 INJECTION, SOLUTION INTRAVENOUS ONCE
Status: COMPLETED | OUTPATIENT
Start: 2023-10-31 | End: 2023-10-31

## 2023-10-31 RX ORDER — KETOROLAC TROMETHAMINE 15 MG/ML
15 INJECTION, SOLUTION INTRAMUSCULAR; INTRAVENOUS EVERY 6 HOURS
Status: DISCONTINUED | OUTPATIENT
Start: 2023-10-31 | End: 2023-10-31 | Stop reason: HOSPADM

## 2023-10-31 RX ORDER — ACETAMINOPHEN 325 MG/1
975 TABLET ORAL ONCE
Status: DISCONTINUED | OUTPATIENT
Start: 2023-10-31 | End: 2023-10-31 | Stop reason: HOSPADM

## 2023-10-31 RX ORDER — FENTANYL CITRATE 50 UG/ML
INJECTION, SOLUTION INTRAMUSCULAR; INTRAVENOUS PRN
Status: DISCONTINUED | OUTPATIENT
Start: 2023-10-31 | End: 2023-10-31

## 2023-10-31 RX ORDER — KETOROLAC TROMETHAMINE 15 MG/ML
15 INJECTION, SOLUTION INTRAMUSCULAR; INTRAVENOUS
Status: DISCONTINUED | OUTPATIENT
Start: 2023-10-31 | End: 2023-10-31 | Stop reason: HOSPADM

## 2023-10-31 RX ORDER — OXYCODONE HYDROCHLORIDE 5 MG/1
5 TABLET ORAL EVERY 4 HOURS PRN
Status: DISCONTINUED | OUTPATIENT
Start: 2023-10-31 | End: 2023-10-31 | Stop reason: HOSPADM

## 2023-10-31 RX ORDER — DEXAMETHASONE SODIUM PHOSPHATE 4 MG/ML
INJECTION, SOLUTION INTRA-ARTICULAR; INTRALESIONAL; INTRAMUSCULAR; INTRAVENOUS; SOFT TISSUE PRN
Status: DISCONTINUED | OUTPATIENT
Start: 2023-10-31 | End: 2023-10-31

## 2023-10-31 RX ORDER — HYDROMORPHONE HCL IN WATER/PF 6 MG/30 ML
0.4 PATIENT CONTROLLED ANALGESIA SYRINGE INTRAVENOUS EVERY 5 MIN PRN
Status: DISCONTINUED | OUTPATIENT
Start: 2023-10-31 | End: 2023-10-31 | Stop reason: HOSPADM

## 2023-10-31 RX ORDER — TRANEXAMIC ACID 10 MG/ML
1 INJECTION, SOLUTION INTRAVENOUS CONTINUOUS
Status: DISCONTINUED | OUTPATIENT
Start: 2023-10-31 | End: 2023-10-31

## 2023-10-31 RX ORDER — METOPROLOL TARTRATE 1 MG/ML
1-2 INJECTION, SOLUTION INTRAVENOUS EVERY 5 MIN PRN
Status: DISCONTINUED | OUTPATIENT
Start: 2023-10-31 | End: 2023-10-31 | Stop reason: HOSPADM

## 2023-10-31 RX ORDER — SODIUM CHLORIDE, SODIUM LACTATE, POTASSIUM CHLORIDE, CALCIUM CHLORIDE 600; 310; 30; 20 MG/100ML; MG/100ML; MG/100ML; MG/100ML
INJECTION, SOLUTION INTRAVENOUS CONTINUOUS
Status: DISCONTINUED | OUTPATIENT
Start: 2023-10-31 | End: 2023-10-31 | Stop reason: HOSPADM

## 2023-10-31 RX ORDER — ONDANSETRON 2 MG/ML
INJECTION INTRAMUSCULAR; INTRAVENOUS
Status: COMPLETED
Start: 2023-10-31 | End: 2023-10-31

## 2023-10-31 RX ORDER — OXYTOCIN 10 [USP'U]/ML
10 INJECTION, SOLUTION INTRAMUSCULAR; INTRAVENOUS
Status: DISCONTINUED | OUTPATIENT
Start: 2023-10-31 | End: 2023-10-31

## 2023-10-31 RX ORDER — HYDRALAZINE HYDROCHLORIDE 20 MG/ML
2.5-5 INJECTION INTRAMUSCULAR; INTRAVENOUS EVERY 10 MIN PRN
Status: DISCONTINUED | OUTPATIENT
Start: 2023-10-31 | End: 2023-10-31 | Stop reason: HOSPADM

## 2023-10-31 RX ORDER — ONDANSETRON 2 MG/ML
INJECTION INTRAMUSCULAR; INTRAVENOUS PRN
Status: DISCONTINUED | OUTPATIENT
Start: 2023-10-31 | End: 2023-10-31

## 2023-10-31 RX ORDER — AMOXICILLIN 250 MG
1-2 CAPSULE ORAL 2 TIMES DAILY
Qty: 30 TABLET | Refills: 0 | Status: SHIPPED | OUTPATIENT
Start: 2023-10-31

## 2023-10-31 RX ORDER — OXYCODONE HYDROCHLORIDE 5 MG/1
5-10 TABLET ORAL EVERY 4 HOURS PRN
Qty: 12 TABLET | Refills: 0 | Status: SHIPPED | OUTPATIENT
Start: 2023-10-31

## 2023-10-31 RX ORDER — IBUPROFEN 800 MG/1
800 TABLET, FILM COATED ORAL EVERY 6 HOURS
Status: DISCONTINUED | OUTPATIENT
Start: 2023-10-31 | End: 2023-10-31 | Stop reason: HOSPADM

## 2023-10-31 RX ORDER — OXYCODONE HYDROCHLORIDE 5 MG/1
10 TABLET ORAL EVERY 4 HOURS PRN
Status: DISCONTINUED | OUTPATIENT
Start: 2023-10-31 | End: 2023-10-31 | Stop reason: HOSPADM

## 2023-10-31 RX ORDER — ACETAMINOPHEN 325 MG/1
650 TABLET ORAL EVERY 6 HOURS
Status: DISCONTINUED | OUTPATIENT
Start: 2023-11-03 | End: 2023-10-31 | Stop reason: HOSPADM

## 2023-10-31 RX ORDER — PROCHLORPERAZINE MALEATE 10 MG
10 TABLET ORAL EVERY 6 HOURS PRN
Status: DISCONTINUED | OUTPATIENT
Start: 2023-10-31 | End: 2023-10-31 | Stop reason: HOSPADM

## 2023-10-31 RX ORDER — ONDANSETRON 4 MG/1
4 TABLET, ORALLY DISINTEGRATING ORAL EVERY 30 MIN PRN
Status: DISCONTINUED | OUTPATIENT
Start: 2023-10-31 | End: 2023-10-31 | Stop reason: HOSPADM

## 2023-10-31 RX ORDER — IBUPROFEN 800 MG/1
800 TABLET, FILM COATED ORAL EVERY 6 HOURS PRN
Qty: 30 TABLET | Refills: 0 | Status: SHIPPED | OUTPATIENT
Start: 2023-10-31

## 2023-10-31 RX ORDER — LIDOCAINE 40 MG/G
CREAM TOPICAL
Status: DISCONTINUED | OUTPATIENT
Start: 2023-10-31 | End: 2023-10-31 | Stop reason: HOSPADM

## 2023-10-31 RX ORDER — LIDOCAINE HYDROCHLORIDE 20 MG/ML
INJECTION, SOLUTION INFILTRATION; PERINEURAL PRN
Status: DISCONTINUED | OUTPATIENT
Start: 2023-10-31 | End: 2023-10-31

## 2023-10-31 RX ORDER — ONDANSETRON 2 MG/ML
4 INJECTION INTRAMUSCULAR; INTRAVENOUS EVERY 6 HOURS PRN
Status: DISCONTINUED | OUTPATIENT
Start: 2023-10-31 | End: 2023-10-31 | Stop reason: HOSPADM

## 2023-10-31 RX ORDER — CEFAZOLIN SODIUM/WATER 2 G/20 ML
2 SYRINGE (ML) INTRAVENOUS SEE ADMIN INSTRUCTIONS
Status: DISCONTINUED | OUTPATIENT
Start: 2023-10-31 | End: 2023-10-31 | Stop reason: HOSPADM

## 2023-10-31 RX ORDER — PROPOFOL 10 MG/ML
INJECTION, EMULSION INTRAVENOUS PRN
Status: DISCONTINUED | OUTPATIENT
Start: 2023-10-31 | End: 2023-10-31

## 2023-10-31 RX ORDER — ONDANSETRON 4 MG/1
4 TABLET, ORALLY DISINTEGRATING ORAL EVERY 6 HOURS PRN
Status: DISCONTINUED | OUTPATIENT
Start: 2023-10-31 | End: 2023-10-31 | Stop reason: HOSPADM

## 2023-10-31 RX ORDER — OXYTOCIN/0.9 % SODIUM CHLORIDE 30/500 ML
500 PLASTIC BAG, INJECTION (ML) INTRAVENOUS CONTINUOUS
Status: DISCONTINUED | OUTPATIENT
Start: 2023-10-31 | End: 2023-10-31

## 2023-10-31 RX ORDER — ONDANSETRON 2 MG/ML
4 INJECTION INTRAMUSCULAR; INTRAVENOUS EVERY 30 MIN PRN
Status: DISCONTINUED | OUTPATIENT
Start: 2023-10-31 | End: 2023-10-31 | Stop reason: HOSPADM

## 2023-10-31 RX ADMIN — Medication 500 ML/HR: at 00:44

## 2023-10-31 RX ADMIN — IBUPROFEN 800 MG: 800 TABLET, FILM COATED ORAL at 11:16

## 2023-10-31 RX ADMIN — TRANEXAMIC ACID 1 MG/KG/HR: 10 INJECTION, SOLUTION INTRAVENOUS at 00:51

## 2023-10-31 RX ADMIN — SODIUM CHLORIDE 1000 ML: 9 INJECTION, SOLUTION INTRAVENOUS at 00:37

## 2023-10-31 RX ADMIN — ACETAMINOPHEN 975 MG: 325 TABLET, FILM COATED ORAL at 17:42

## 2023-10-31 RX ADMIN — LIDOCAINE HYDROCHLORIDE 100 MG: 20 INJECTION, SOLUTION INFILTRATION; PERINEURAL at 02:11

## 2023-10-31 RX ADMIN — TRANEXAMIC ACID 1 G: 10 INJECTION, SOLUTION INTRAVENOUS at 00:36

## 2023-10-31 RX ADMIN — DEXAMETHASONE SODIUM PHOSPHATE 4 MG: 4 INJECTION, SOLUTION INTRA-ARTICULAR; INTRALESIONAL; INTRAMUSCULAR; INTRAVENOUS; SOFT TISSUE at 01:57

## 2023-10-31 RX ADMIN — FENTANYL CITRATE 100 MCG: 50 INJECTION INTRAMUSCULAR; INTRAVENOUS at 02:11

## 2023-10-31 RX ADMIN — SODIUM CHLORIDE, POTASSIUM CHLORIDE, SODIUM LACTATE AND CALCIUM CHLORIDE: 600; 310; 30; 20 INJECTION, SOLUTION INTRAVENOUS at 05:19

## 2023-10-31 RX ADMIN — ONDANSETRON 4 MG: 2 INJECTION INTRAMUSCULAR; INTRAVENOUS at 01:23

## 2023-10-31 RX ADMIN — PROPOFOL 150 MG: 10 INJECTION, EMULSION INTRAVENOUS at 02:11

## 2023-10-31 RX ADMIN — ACETAMINOPHEN 975 MG: 325 TABLET, FILM COATED ORAL at 11:16

## 2023-10-31 RX ADMIN — CALCIUM GLUCONATE 1 G: 20 INJECTION, SOLUTION INTRAVENOUS at 01:26

## 2023-10-31 RX ADMIN — SODIUM CHLORIDE, SODIUM LACTATE, POTASSIUM CHLORIDE, CALCIUM CHLORIDE: 600; 310; 30; 20 INJECTION, SOLUTION INTRAVENOUS at 01:48

## 2023-10-31 RX ADMIN — Medication 100 MG: at 02:11

## 2023-10-31 RX ADMIN — CEFAZOLIN 2 G: 1 INJECTION, POWDER, FOR SOLUTION INTRAMUSCULAR; INTRAVENOUS at 02:09

## 2023-10-31 RX ADMIN — ONDANSETRON 4 MG: 2 INJECTION INTRAMUSCULAR; INTRAVENOUS at 02:14

## 2023-10-31 RX ADMIN — IBUPROFEN 800 MG: 800 TABLET, FILM COATED ORAL at 17:42

## 2023-10-31 RX ADMIN — CARBOPROST TROMETHAMINE 250 MCG: 250 INJECTION, SOLUTION INTRAMUSCULAR at 00:49

## 2023-10-31 RX ADMIN — ONDANSETRON 4 MG: 2 INJECTION INTRAMUSCULAR; INTRAVENOUS at 01:41

## 2023-10-31 ASSESSMENT — ACTIVITIES OF DAILY LIVING (ADL)
ADLS_ACUITY_SCORE: 20
ADLS_ACUITY_SCORE: 20
FALL_HISTORY_WITHIN_LAST_SIX_MONTHS: NO
DIFFICULTY_EATING/SWALLOWING: NO
WEAR_GLASSES_OR_BLIND: YES
WALKING_OR_CLIMBING_STAIRS_DIFFICULTY: NO
DRESSING/BATHING_DIFFICULTY: NO
ADLS_ACUITY_SCORE: 20
VISION_MANAGEMENT: GLASSES
ADLS_ACUITY_SCORE: 20
ADLS_ACUITY_SCORE: 20
DIFFICULTY_COMMUNICATING: NO
TOILETING_ISSUES: NO
CONCENTRATING,_REMEMBERING_OR_MAKING_DECISIONS_DIFFICULTY: NO
ADLS_ACUITY_SCORE: 35
ADLS_ACUITY_SCORE: 35
HEARING_DIFFICULTY_OR_DEAF: NO
ADLS_ACUITY_SCORE: 30
DOING_ERRANDS_INDEPENDENTLY_DIFFICULTY: NO
ADLS_ACUITY_SCORE: 35
CHANGE_IN_FUNCTIONAL_STATUS_SINCE_ONSET_OF_CURRENT_ILLNESS/INJURY: NO

## 2023-10-31 NOTE — OP NOTE
PREOPERATIVE DIAGNOSES: WR is a very pleasant 43-year-old  POD #10 from CS for twin gestation with severe post partum hemorrhage.   POSTOPERATIVE DIAGNOSES: WR is a very pleasant 43-year-old  POD #10 from CS for twin gestation with severe post partum hemorrhage.   PROCEDURE: ultrasound guided suction dilatation and curettage.   SURGEON: Marilee Sanders DO.   COMPLICATIONS: None.   ESTIMATED BLOOD LOSS: 600 mL during the case.    URINE OUTPUT: 100 mL of clear urine   INDICATIONS: WR is a very pleasant 43-year-old  POD #10 from CS for twin gestation with severe post partum hemorrhage.  She had a hemoglobin of 7 on admission and received 2 unit(s) PRBC and 2 unit(s) plasma prior to and during the case. Repeat hemoglobin will be done post op and in the am.   She has been counseled regarding risks, benefits and alternatives of the procedure.  I recommend emergent suction dilation and curettage under ultrasound guidance.   She started receiving pitocin and TXA in the ER. But it was noted that the TXA did not go through the ER IV line, and so the TXA was given once the IV line was secured n a different manner per anesthesia.  As noted below she received 800 mcg of rectal misoprostol at the conclusion of the case.   FINDINGS: Uterus 20 weeks size with large clot noted up to the fundus, filling the uterus, post procedure Endometrium < 4 mm visually, and uterus @ 12 week size, Kadi device placed.  COMPLICATIONS: None.   DESCRIPTION OF PROCEDURE: After informed consent was obtained, the patient was taken to the operating room where she was placed in dorsal supine position, placed under general anesthesia.  The patient was then prepped and draped in normal sterile fashion. A timeout was performed.   A ricketts catheter was placed. Preoperative antibiotics were given, Ancef 2 gm IV at beginning of procedure.  Exam under anesthesia reveals the findings above. he bivalve speculum was placed in the patient's  vaginal vault. the device was tested properly up to 60 mmHg.  The posterior and then anterior lip of the cervix was grasped with a single-tooth tenaculum. Using a # 10 and then 14 curved curet, Suction D&C was performed under ultrasound guidance, with the resulting endometrial stripe at < 4 mm visually.  The contents of the uterus were sent to pathology.  The posterior cervical tenaculum was removed. After this, The Kadi device was placed using a ring forceps under ultrasound guidance.  The single-tooth tenaculum was removed from the anterior lip of the cervix.  Hemostasis was assured.  The speculum was removed from the patient's vagina. Misoprostol 800 mcg was place rectally.  The patient was then taken out of the dorsal lithotomy position, placed in dorsal supine position, awakened from anesthesia and taken to the recovery room in stable condition. The patient will be admitted for care overnight, with the ricketts catheter and Kadi suction device in place until stable, likely able to discontinue in the am.     Dr. Marilee Sanders, DO    Obstetrics and Gynecology  [facility] [place]

## 2023-10-31 NOTE — ED PROVIDER NOTES
History     Chief Complaint:  No chief complaint on file.       The history is provided by the patient.      Amy Clemens is a 43 year old female who presents to the ED via EMS for evaluation of postpartum hemorrhage. Patient had a C section on 10/20/2023, where she delivered twins. Vaginal bleeding started about 45 minutes prior to arrival, where patient was passing large clots. Patient's systolic blood pressure was in the 80s on EMS arrival. EMS's most recent blood pressure was 102/61. Patient was 84% on room air and is now in the 90s, while on 15L of oxygen. Patient denies feeling ill recently. Outside of this patient had gestational hypertension.     Independent Historian:   EMS - They report handoff report    Review of External Notes:   Discharge summary 10/21/2023 scheduled  for di/di twins with severe growth restriction of twin A and advanced general age.  Prenatal course completed by gestational diabetes.  Hemoglobin 11.7    Medications:    acetaminophen (TYLENOL) 325 MG tablet  ibuprofen (ADVIL/MOTRIN) 800 MG tablet  levothyroxine (SYNTHROID/LEVOTHROID) 50 MCG tablet  metFORMIN (GLUCOPHAGE-XR) 500 MG 24 hr tablet  oxyCODONE (ROXICODONE) 5 MG tablet  pantoprazole (PROTONIX) 20 MG EC tablet  Prenatal Vit-Fe Fumarate-FA (PRENATAL MULTIVITAMIN W/IRON) 27-0.8 MG tablet  senna-docusate (SENOKOT-S/PERICOLACE) 8.6-50 MG tablet  Vitamin D3 (CHOLECALCIFEROL) 125 MCG (5000 UT) tablet        Past Medical History:    Past Medical History:   Diagnosis Date    Endometriosis     Gastroesophageal reflux disease     Thyroid disease     Tubal occlusion        Past Surgical History:    Past Surgical History:   Procedure Laterality Date     SECTION N/A 10/20/2023    Procedure:  SECTION;  Surgeon: Jessie Cardoza MD;  Location: [facility]      DILATION AND CURETTAGE, OPERATIVE HYSTEROSCOPY WITH MORCELLATOR, COMBINED N/A 2019    Procedure: hysteroscopy,  dilation and curettage, polypectomy with myosure;  Surgeon: Floresita Zimmerman MD;  Location: [facility] OR    DILATION AND CURETTAGE, OPERATIVE HYSTEROSCOPY WITH MORCELLATOR, COMBINED N/A 2020    Procedure: Hysteroscopy, dilation and curettage, endometrial polypectomy;  Surgeon: Floresita Zimmerman MD;  Location: [facility] OR    DILATION AND CURETTAGE, OPERATIVE HYSTEROSCOPY WITH MORCELLATOR, COMBINED N/A 2020    Procedure: diagnostic HYSTEROSCOPY, POLYPECTOMY WITH MYOSURE MORCELLATOR, sharp DILATION AND CURETTAGE;  Surgeon: Arabella Felix MD;  Location: [facility] OR    LAPAROSCOPY DIAGNOSTIC (GYN) N/A 3/18/2021    Procedure: Laparoscopy,  laparotomy for tubal reanastomosis;  Surgeon: Nick Herrera MD;  Location:  OR    TUBAL LIGATION      tubal ligation    TUBOPLASTY REANASTOMOSIS N/A 3/18/2021    Procedure: REANASTOMOSIS, FALLOPIAN TUBE;  Surgeon: Nick Herrera MD;  Location:  OR        Physical Exam   Patient Vitals for the past 24 hrs:   BP Pulse Resp SpO2   10/31/23 0135 -- 85 17 100 %   10/31/23 0130 (!) 165/96 87 11 100 %   10/31/23 0125 (!) 167/110 85 12 100 %   10/31/23 0120 (!) 145/94 84 11 100 %   10/31/23 0115 (!) 139/98 104 25 100 %   10/31/23 0110 -- 101 26 99 %   10/31/23 0105 (!) 147/80 109 15 100 %   10/31/23 0100 (!) 167/108 96 21 100 %   10/31/23 0055 127/71 80 17 96 %   10/31/23 0050 133/78 96 (!) 6 96 %   10/31/23 0045 (!) 145/62 84 23 100 %   10/31/23 0040 123/73 97 (!) 8 100 %   10/31/23 0039 116/72 74 22 100 %        Physical Exam  Constitutional: Alert, attentive, GCS 15   Eyes: EOM are normal, anicteric, conjugate gaze  CV: distal extremities warm, well perfused  Chest: Non-labored breathing on RA  GI:  incision is clean dry and intact, fundus palpated below the umbilicus  : Constant stream of vaginal blood noted externally  Neurological: Alert, attentive, moving all extremities equally.   Skin: Pale and cool      Emergency Department Course         Laboratory:  Labs  Ordered and Resulted from Time of ED Arrival to Time of ED Departure   COMPREHENSIVE METABOLIC PANEL - Abnormal       Result Value    Sodium 139      Potassium 4.0      Carbon Dioxide (CO2) 23      Anion Gap 6 (*)     Urea Nitrogen 21.0 (*)     Creatinine 0.70      GFR Estimate >90      Calcium 7.2 (*)     Chloride 110 (*)     Glucose 193 (*)     Alkaline Phosphatase 80      AST 12      ALT 17      Protein Total 4.3 (*)     Albumin 2.4 (*)     Bilirubin Total <0.2     INR - Abnormal    INR 1.21 (*)    HCG QUANTITATIVE PREGNANCY - Abnormal    hCG Quantitative 22 (*)    CBC WITH PLATELETS AND DIFFERENTIAL - Abnormal    WBC Count 10.7      RBC Count 2.49 (*)     Hemoglobin 7.7 (*)     Hematocrit 24.2 (*)     MCV 97      MCH 30.9      MCHC 31.8      RDW 13.3      Platelet Count 205      % Neutrophils 81      % Lymphocytes 12      % Monocytes 5      % Eosinophils 1      % Basophils 0      % Immature Granulocytes 1      NRBCs per 100 WBC 0      Absolute Neutrophils 8.6 (*)     Absolute Lymphocytes 1.3      Absolute Monocytes 0.5      Absolute Eosinophils 0.1      Absolute Basophils 0.0      Absolute Immature Granulocytes 0.1      Absolute NRBCs 0.0     GLUCOSE BY METER - Abnormal    GLUCOSE BY METER POCT 169 (*)    ISTAT BASIC CHEM ICA HEMATOCRIT POCT - Abnormal    Chloride POCT 109      Potassium POCT 3.8      Sodium POCT 141      UREA NITROGEN POCT 19      Calcium, Ionized Whole Blood POCT 4.6      Glucose Whole Blood POCT 185 (*)     Anion Gap POCT 17.0 (*)     Hemoglobin POCT 7.1 (*)     Hematocrit POCT 21 (*)     Creatinine POCT 0.7      TOTAL CO2 POCT 20     ISTAT GASES LACTATE VENOUS POCT - Abnormal    Lactic Acid POCT 1.6      Bicarbonate Venous POCT 20 (*)     O2 Sat, Venous POCT 37 (*)     pCO2 Venous POCT 42      pH Venous POCT 7.30 (*)     pO2 Venous POCT 24 (*)    PARTIAL THROMBOPLASTIN TIME - Normal    aPTT 35     TYPE AND SCREEN, ADULT    ABO/RH(D) O POS      Antibody Screen Negative      SPECIMEN  EXPIRATION DATE 20231103235900     PREPARE RED BLOOD CELLS (UNIT)    ISSUE DATE AND TIME 20231031005600      Blood Component Type Red Blood Cells      Product Code Y4532D07      Unit Status Issued      Unit Number [id number]      UNIT ABO/RH O-      CODING SYSTEM XEPB776      UNIT TYPE ISBT 9500     PREPARE RED BLOOD CELLS (UNIT)    ISSUE DATE AND TIME 20231031005600      Blood Component Type Red Blood Cells      Product Code J7589S11      Unit Status Issued      Unit Number [id number]      UNIT ABO/RH O-      CODING SYSTEM JMNG431      UNIT TYPE ISBT 9500     PREPARE PHERESED PLATELETS (UNIT)    Blood Component Type Platelets      Product Code M2911P67      Unit Status Issued      Unit Number [id number]      CODING SYSTEM XQSC745      ISSUE DATE AND TIME 20231031014000      UNIT ABO/RH A+      UNIT TYPE ISBT 6200     PREPARE RED BLOOD CELLS (UNIT)    Blood Component Type Red Blood Cells      Product Code O6618K65      Unit Status Issued      Unit Number [id number]      CROSSMATCH Compatible      CODING SYSTEM EREX430      ISSUE DATE AND TIME 20231031012000      UNIT ABO/RH O+      UNIT TYPE ISBT 5100     PREPARE RED BLOOD CELLS (UNIT)    Blood Component Type Red Blood Cells      Product Code H8489P09      Unit Status Issued      Unit Number [id number]      CROSSMATCH Compatible      CODING SYSTEM WBIU405      ISSUE DATE AND TIME 20231031012000      UNIT ABO/RH O+      UNIT TYPE ISBT 5100     PREPARE RED BLOOD CELLS (UNIT)    Blood Component Type Red Blood Cells      Product Code U1571B21      Unit Status Issued      Unit Number [id number]      CROSSMATCH Compatible      CODING SYSTEM COZT861      ISSUE DATE AND TIME 20231031012000      UNIT ABO/RH O+      UNIT TYPE ISBT 5100     PREPARE RED BLOOD CELLS (UNIT)    Blood Component Type Red Blood Cells      Product Code B0333S89      Unit Status Issued      Unit Number [id number]      CROSSMATCH Compatible      CODING SYSTEM IFXG687      ISSUE DATE  AND TIME [id number]      UNIT ABO/RH O+      UNIT TYPE ISBT 5100     PREPARE PLASMA (UNIT)    Blood Component Type Plasma      Product Code B1219L28      Unit Status Ready for issue      Unit Number [id number]      CODING SYSTEM EATD128     PREPARE PLASMA (UNIT)    Blood Component Type Plasma      Product Code K9506P07      Unit Status Ready for issue      Unit Number [id number]      CODING SYSTEM VRXG907     PREPARE PLASMA (UNIT)    Blood Component Type Plasma      Product Code U1236F86      Unit Status Ready for issue      Unit Number [id number]      CODING SYSTEM LYMF812     ABO/RH TYPE AND SCREEN        Emergency Department Course & Assessments:    Interventions:  Medications   tranexamic acid 1 g in 100 mL NS IV bag (premix) (1 mg/kg/hr × 97.5 kg Intravenous $New Bag 10/31/23 0051)   carboprost (HEMABATE) injection 250 mcg (250 mcg Intramuscular $Given 10/31/23 0049)   oxytocin (PITOCIN) injection 10 Units (has no administration in time range)   oxytocin (PITOCIN) 30 units in 500 mL 0.9% NaCl infusion (500 mL/hr Intravenous $New Bag 10/31/23 0044)   calcium gluconate 1 g in 50 mL in sodium chloride intermittent infusion (1 g Intravenous $Given 10/31/23 0126)   ondansetron (ZOFRAN) injection 4 mg (has no administration in time range)   tranexamic acid 1 g in 100 mL NS IV bag (premix) (1 g Intravenous $Given 10/31/23 0036)   sodium chloride 0.9% BOLUS 1,000 mL (0 mLs Intravenous Stopped 10/31/23 0130)   misoprostol (CYTOTEC) tablet 800 mcg (800 mcg Rectal $Given 10/31/23 0122)   ondansetron (ZOFRAN) injection 4 mg (4 mg Intravenous $Given 10/31/23 0123)          Independent Interpretation (X-rays, CTs, rhythm strip):  None    Consultations/Discussion of Management or Tests:    ED Course as of 10/31/23 0125   Tue Oct 31, 2023   0032 I obtained history and examined the patient as noted above.     0050 I spoke with Dr. Marilee Sanders from obstetrics and gynecology and regarding the patient's presentation  and plan of care.       0055 I       Social Determinants of Health affecting care:   None    Disposition:  The patient was transferred to the OR under the care of Dr. Sanders    Impression & Plan    Medical Decision Makin-year-old who is 10 days out from a scheduled  for di/di twins presenting with sudden heavy vaginal bleeding.  Paramedics found her hypotensive, pale, blood pressure was obtained with fluids running.  On arrival here, she was noted to have moderately brisk vaginal bleeding, with improving blood pressure, i-STAT hemoglobin was 7.1, after transfusion protocol started empirically with 2 L crystalloid infusing.  Blood pressure did improve with  plan to give initial 2 units of massive transfusion cooler but hold rest at bedside.  She is empirically given a TXA load and infusion, I performed fundal fundal massage with top of the uterus being palpated several centimeters above the umbilicus, she was also given  Cytotec, Hemabate and several doses of Zofran for nausea.  Blood pressure did slowly trend up and she was in fact hypertensive.  OB/GYN patient evaluated patient at bedside, due to persistent bleeding, they elected to take patient for emergent D&C.     Critical Care time was 45 minutes for this patient excluding procedures.      Diagnosis:    ICD-10-CM    1. Delayed postpartum hemorrhage  O72.2 Case Request: DILATION AND CURETTAGE, UTERUS, USING SUCTION, WITH ULTRASOUND GUIDANCE     Case Request: DILATION AND CURETTAGE, UTERUS, USING SUCTION, WITH ULTRASOUND GUIDANCE             Rafa Alaniz MD  [employer]  1:44 AM 10/31/23         Katty, Rafa Talley MD  10/31/23 0145

## 2023-10-31 NOTE — PROGRESS NOTES
[facility]    Gynecology  Daily Post-Op Note    Assessment & Plan   Procedure(s):  DILATION AND CURETTAGE, UTERUS, USING SUCTION, WITH ULTRASOUND GUIDANCE   -Day of Surgery    No excessive bleeding  Pain well-controlled.  ABLA 2/2 hemorrhage, status post transfusion.  Kadi in, Gutiérrez in.    Plan:  Deflated Kadi, will remove in 1/2 hour if no bleeding.  If removing Kadi, remove Gutiérrez.  When she is ambulatory, if symptoms, consider transfusion of additional PRBCs.  Will need iron at discharge.  Discussed with the patient--she is anxious to discharge today, but we discussed parameters by which this would be possible and why we would want to potentially watch her longer.    Shefali Munoz MD    Interval History   Stable overnight since procedure. 100 ml in drain (Kadi)    Physical Exam   Temp: 99.4  F (37.4  C) Temp src: Oral BP: 125/70 Pulse: 100   Resp: 15 SpO2: 98 % O2 Device: None (Room air) Oxygen Delivery: 3 LPM  There were no vitals filed for this visit.  Vital Signs with Ranges  Temp:  [97.9  F (36.6  C)-99.7  F (37.6  C)] 99.4  F (37.4  C)  Pulse:  [] 100  Resp:  [6-26] 15  BP: (108-167)/() 125/70  SpO2:  [96 %-100 %] 98 %  I/O last 3 completed shifts:  In: 1148 [I.V.:500]  Out: 1000 [Urine:300; Blood:700]    Incision C/D/I  Extremities Non-tender  Abdomen soft, non-tender    Medications    lactated ringers 150 mL/hr at 10/31/23 0519       acetaminophen  975 mg Oral Q6H    Followed by    [START ON 11/3/2023] acetaminophen  650 mg Oral Q6H    ketorolac  15 mg Intravenous Q6H    Or    ibuprofen  800 mg Oral Q6H    senna-docusate  1 tablet Oral BID    sodium chloride (PF)  3 mL Intracatheter Q8H       Data   Recent Labs   Lab 10/31/23  0730 10/31/23  0326 10/31/23  0039 10/31/23  0037   HGB 7.2* 8.0* 7.1* 7.7*

## 2023-10-31 NOTE — ANESTHESIA POSTPROCEDURE EVALUATION
Patient: Amy Clemens    Procedure: Procedure(s):  DILATION AND CURETTAGE, UTERUS, USING SUCTION, WITH ULTRASOUND GUIDANCE       Anesthesia Type:  General    Note:  Disposition: Inpatient   Postop Pain Control: Uneventful            Sign Out: Well controlled pain   PONV: No   Neuro/Psych: Uneventful            Sign Out: Acceptable/Baseline neuro status   Airway/Respiratory: Uneventful            Sign Out: Acceptable/Baseline resp. status   CV/Hemodynamics: Uneventful            Sign Out: Acceptable CV status; No obvious hypovolemia; No obvious fluid overload   Other NRE:             Transfusion: Massive Transfusion Protocol   DID A NON-ROUTINE EVENT OCCUR? No    Event details/Postop Comments:  Pt w/ post-partum hemorrhage brought directly into OR. MTP was initiated in the ER.           Last vitals:  Vitals Value Taken Time   /85 10/31/23 0315   Temp 97.9  F (36.6  C) 10/31/23 0256   Pulse 100 10/31/23 0320   Resp 21 10/31/23 0320   SpO2 100 % 10/31/23 0320   Vitals shown include unfiled device data.    Electronically Signed By: Timmy Acevedo MD  October 31, 2023  3:21 AM   Azithromycin Counseling:  I discussed with the patient the risks of azithromycin including but not limited to GI upset, allergic reaction, drug rash, diarrhea, and yeast infections.

## 2023-10-31 NOTE — OR NURSING
Writer unable to obtain complete nursing history due to the need for pt to be in OR stat. Continues to bleed and having blood transfusion in the ED.  Dr. Sanders is at the bedside, surgery and blood consent obtained.  Unable to do Pre due to the urgency of the case.

## 2023-10-31 NOTE — ANESTHESIA PREPROCEDURE EVALUATION
Anesthesia Pre-Procedure Evaluation    Patient: Amy Clemens   MRN: [id number] : 1980        Procedure : Procedure(s):  DILATION AND CURETTAGE, UTERUS, USING SUCTION, WITH ULTRASOUND GUIDANCE          Past Medical History:   Diagnosis Date    Endometriosis     Gastroesophageal reflux disease     Thyroid disease     Tubal occlusion       Past Surgical History:   Procedure Laterality Date     SECTION N/A 10/20/2023    Procedure:  SECTION;  Surgeon: Jessie Cardoza MD;  Location: [facility] OR    CHOLECYSTECTOMY      DILATION AND CURETTAGE, OPERATIVE HYSTEROSCOPY WITH MORCELLATOR, COMBINED N/A 2019    Procedure: hysteroscopy, dilation and curettage, polypectomy with myosure;  Surgeon: Floresita Zimmerman MD;  Location: [facility] OR    DILATION AND CURETTAGE, OPERATIVE HYSTEROSCOPY WITH MORCELLATOR, COMBINED N/A 2020    Procedure: Hysteroscopy, dilation and curettage, endometrial polypectomy;  Surgeon: Floresita Zimmerman MD;  Location: [facility] OR    DILATION AND CURETTAGE, OPERATIVE HYSTEROSCOPY WITH MORCELLATOR, COMBINED N/A 2020    Procedure: diagnostic HYSTEROSCOPY, POLYPECTOMY WITH MYOSURE MORCELLATOR, sharp DILATION AND CURETTAGE;  Surgeon: Arabella Felix MD;  Location:  OR    LAPAROSCOPY DIAGNOSTIC (GYN) N/A 3/18/2021    Procedure: Laparoscopy,  laparotomy for tubal reanastomosis;  Surgeon: Nick Herrera MD;  Location: [facility] OR    TUBAL LIGATION  2006    tubal ligation    TUBOPLASTY REANASTOMOSIS N/A 3/18/2021    Procedure: REANASTOMOSIS, FALLOPIAN TUBE;  Surgeon: Nick Herrera MD;  Location: [facility] OR      No Known Allergies   Social History     Tobacco Use    Smoking status: Never    Smokeless tobacco: Never   Substance Use Topics    Alcohol use: No      Wt Readings from Last 1 Encounters:   10/22/23 97.5 kg (215 lb)        Anesthesia Evaluation   Pt has had prior anesthetic. Type: Regional.        ROS/MED HX  ENT/Pulmonary:  - neg pulmonary ROS      Neurologic:  - neg neurologic ROS     Cardiovascular:     (+)  hypertension- -   -  - -                                      METS/Exercise Tolerance:     Hematologic: Comments: Delayed postpartum hemorrhage     Lab Test        10/31/23     10/31/23     10/21/23     10/20/23     03/05/23     03/15/21     04/10/20                       0039          0037          0514          1140          1513          1422          1626          WBC           --          10.7         12.0*        6.6            < >        7.1          8.3           HGB          7.1*         7.7*         11.7         13.7           < >        14.1         10.7*         MCV           --          97           94           91             < >        93           95            PLT           --          205          155          157            < >        292          308           INR           --          1.21*         --           --           --          0.96         1.00           < > = values in this interval not displayed.                  Lab Test        10/31/23     10/31/23     10/31/23     10/21/23     10/20/23                       0039          0037          0036          0514          1140          NA           141          139           --          132*         139           POTASSIUM    3.8          4.0           --          4.7          4.4           CHLORIDE     109          110*          --          101          106           CO2           --          23            --          23           20*           BUN          19           21.0*         --          18.2         14.0          CR           0.7          0.70          --          0.78         0.66          ANIONGAP      --          6*            --          8            13            DAVID           --          7.2*          --          9.0          9.6           GLC          185*         193*         169*         111*         74                (+)      anemia, history of blood  transfusion, no previous transfusion reaction, Known PRBC Anitbodies:No       Musculoskeletal:  - neg musculoskeletal ROS     GI/Hepatic:     (+) GERD, Asymptomatic on medication,                  Renal/Genitourinary:  - neg Renal ROS     Endo:     (+)          thyroid problem, Thyroid disease - Other,           Psychiatric/Substance Use:  - neg psychiatric ROS     Infectious Disease:  - neg infectious disease ROS     Malignancy:  - neg malignancy ROS     Other:  - neg other ROS          Physical Exam    Airway        Mallampati: II   TM distance: > 3 FB   Neck ROM: full   Mouth opening: > 3 cm    Respiratory Devices and Support         Dental       (+) Completely normal teeth      Cardiovascular   cardiovascular exam normal          Pulmonary   pulmonary exam normal                OUTSIDE LABS:  CBC:   Lab Results   Component Value Date    WBC 10.7 10/31/2023    WBC 12.0 (H) 10/21/2023    HGB 7.1 (L) 10/31/2023    HGB 7.7 (L) 10/31/2023    HCT 21 (L) 10/31/2023    HCT 24.2 (L) 10/31/2023     10/31/2023     10/21/2023     BMP:   Lab Results   Component Value Date     10/31/2023     10/31/2023    POTASSIUM 3.8 10/31/2023    POTASSIUM 4.0 10/31/2023    CHLORIDE 109 10/31/2023    CHLORIDE 110 (H) 10/31/2023    CO2 23 10/31/2023    CO2 23 10/21/2023    BUN 19 10/31/2023    BUN 21.0 (H) 10/31/2023    CR 0.7 10/31/2023    CR 0.70 10/31/2023     (H) 10/31/2023     (H) 10/31/2023     COAGS:   Lab Results   Component Value Date    PTT 35 10/31/2023    INR 1.21 (H) 10/31/2023     POC:   Lab Results   Component Value Date    HCG Negative 09/29/2020     HEPATIC:   Lab Results   Component Value Date    ALBUMIN 2.4 (L) 10/31/2023    PROTTOTAL 4.3 (L) 10/31/2023    ALT 17 10/31/2023    AST 12 10/31/2023    ALKPHOS 80 10/31/2023    BILITOTAL <0.2 10/31/2023     OTHER:   Lab Results   Component Value Date    LACT 1.6 10/31/2023    DAVID 7.2 (L) 10/31/2023    LIPASE 164 03/11/2018    TSH 1.20  04/10/2020    SED 7 10/15/2016       Anesthesia Plan    ASA Status:  3, emergent    NPO Status:  ELEVATED Aspiration Risk/Unknown    Anesthesia Type: General.     - Airway: ETT   Induction: Propofol.   Maintenance: Balanced.        Consents    Anesthesia Plan(s) and associated risks, benefits, and realistic alternatives discussed. Questions answered and patient/representative(s) expressed understanding.     - Discussed:     - Discussed with:  Patient      - Extended Intubation/Ventilatory Support Discussed: No.      - Patient is DNR/DNI Status: No     Use of blood products discussed: Yes.     - Discussed with: Patient.     - Consented: consented to blood products     Postoperative Care    Pain management: IV analgesics.   PONV prophylaxis: Dexamethasone or Solumedrol, Ondansetron (or other 5HT-3)     Comments:                Timmy Acevedo MD

## 2023-10-31 NOTE — ANESTHESIA PROCEDURE NOTES
Airway       Patient location during procedure: OR       Procedure Start/Stop Times: 10/31/2023 1:57 AM  Staff -        CRNA: Ivonne Rivas APRN CRNA       Performed By: CRNA  Consent for Airway        Urgency: elective  Indications and Patient Condition       Indications for airway management: kori-procedural       Induction type:RSI       Mask difficulty assessment: 0 - not attempted    Final Airway Details       Final airway type: endotracheal airway       Successful airway: ETT - single  Endotracheal Airway Details        ETT size (mm): 7.0       Cuffed: yes       Successful intubation technique: direct laryngoscopy       DL Blade Type: Carvajal 2       Grade View of Cords: 1       Adjucts: stylet       Position: Right       Measured from: gums/teeth       Secured at (cm): 21       Bite block used: None    Post intubation assessment        Placement verified by: capnometry, equal breath sounds and chest rise        Number of attempts at approach: 1       Number of other approaches attempted: 0       Secured with: plastic tape       Ease of procedure: easy       Dentition: Intact and Unchanged    Medication(s) Administered   Medication Administration Time: 10/31/2023 1:57 AM

## 2023-10-31 NOTE — OR NURSING
Pt was vitally stable throughout her PACU stay. She was still having large amount of stool with undigested corn particles. A complete bed bath was given.  Hemoglobin was 8.0 and Dr. Acevedo was made aware,no transfusion needed at this time. Pt is alert and oriented x 4. Interacting well with staff at the bedside. CMS is intact. Noted just small amount of blood on her bed. Has TEQUILA drain and had 100 ml output of bright red blood with no clots.

## 2023-10-31 NOTE — ED TRIAGE NOTES
45min PTA pt began vag bleeding passing large clots. 10/ . 80s systolic for ems arrival got her up to 102/61 after 1.5L NS. Pt pale and cool. Awake and alert. 84% on RA up to 90s on 15L. ABCs intact A&Ox4

## 2023-10-31 NOTE — PROVIDER NOTIFICATION
10/31/23 1734   Provider Notification   Provider Name/Title Dr Munoz   Method of Notification Phone   Request Evaluate-Remote   Notification Reason Other     Provider notified that patient is requesting to discharge this evening. Provider said it would be okay to discharge depending on how patient was doing while up and ambulating. Writer stated that the patient has done well being up and ambulating twice since 1500. Provider stated she will put in discontinue orders.

## 2023-10-31 NOTE — PLAN OF CARE
A&O x4. VSS on RA. C/o mild headache. Tylenol and Ibuprofen to manage. Up SBA. Denies dizziness, lightheaded. Regular diet. Tolerating fair. LS clear. Brock with DARLINE this shift. Removed at 1230. Progressing toward plan of care.

## 2023-10-31 NOTE — DISCHARGE INSTRUCTIONS
Warning Signs after Having a Baby    Keep this paper on your fridge or somewhere else where you can see it.    Call your provider if you have any of these symptoms up to 12 weeks after having your baby.    Thoughts of hurting yourself or your baby  Pain in your chest or trouble breathing  Severe headache not helped by pain medicine  Eyesight concerns (blurry vision, seeing spots or flashes of light, other changes to eyesight)  Fainting, shaking or other signs of a seizure    Call 9-1-1 if you feel that it is an emergency.     The symptoms below can happen to anyone after giving birth. They can be very serious. Call your provider if you have any of these warning signs.    My provider s phone number: _______________________    Losing too much blood (hemorrhage)    Call your provider if you soak through a pad in less than an hour or pass blood clots bigger than a golf ball. These may be signs that you are bleeding too much.    Blood clots in the legs or lungs    After you give birth, your body naturally clots its blood to help prevent blood loss. Sometimes this increased clotting can happen in other areas of the body, like the legs or lungs. This can block your blood flow and be very dangerous.     Call your provider if you:  Have a red, swollen spot on the back of your leg that is warm or painful when you touch it.   Are coughing up blood.     Infection    Call your provider if you have any of these symptoms:  Fever of 100.4 F (38 C) or higher.  Pain or redness around your stitches if you had an incision.   Any yellow, white, or green fluid coming from places where you had stitches or surgery.    Mood Problems (postpartum depression)    Many people feel sad or have mood changes after having a baby. But for some people, these mood swings are worse.     Call your provider right away if you feel so anxious or nervous that you can't care for yourself or your baby.    Preeclampsia (high blood pressure)    Even if you  didn't have high blood pressure when you were pregnant, you are at risk for the high blood pressure disease called preeclampsia. This risk can last up to 12 weeks after giving birth.     Call your provider if you have:   Pain on your right side under your rib cage  Sudden swelling in the hands and face    Remember: You know your body. If something doesn't feel right, get medical help.     For informational purposes only. Not to replace the advice of your health care provider. Copyright 2020 [facility]. All rights reserved. Clinically reviewed by Nickie Corbett, RNC-OB, MSN. [employer] [id number] - Rev 02/23.    Call clinic to be seen before end of the week.

## 2023-10-31 NOTE — PLAN OF CARE
Vitals stable. Postpartum checks within normal limits. Voiding spontaneously. Ambulating independently with no weakness or dizziness. Minimal bleeding. Pain controlled with tylenol and ibuprofen. IV's removed. Discharge medications given to patient and education complete. Discharge education complete with patient. Paperwork signed. Walked out with staff at 1830.

## 2023-10-31 NOTE — DISCHARGE SUMMARY
[facility]    Discharge Summary  Obstetrics and Gynecology    Date of Admission:  10/31/2023  Date of Discharge:  10/31/2023  Discharging Provider: Shefali Munoz MD    Discharge Diagnoses   Delayed postpartum hemorrhage    Procedure/Surgery Information   Procedure: Procedure(s):  DILATION AND CURETTAGE, UTERUS, USING SUCTION, WITH ULTRASOUND GUIDANCE   Surgeon(s): Surgeon(s) and Role:     * Marilee Sanders DO - Primary   Specimens: ID Type Source Tests Collected by Time Destination   1 :  [employer] of [employer] SURGICAL PATHOLOGY EXAM Marilee Sanders,  10/31/2023  2:34 AM    2 :  [employer] SURGICAL PATHOLOGY EXAM Marilee Sanders,  10/31/2023  3:29 AM    3 :  [employer] SURGICAL PATHOLOGY EXAM Marilee Sanders, DO 10/31/2023  3:30 AM    4 :  [employer] SURGICAL PATHOLOGY EXAM Marilee Sanders,  10/31/2023  3:30 AM       Non-operative procedures None performed     History of Present Illness   Amy Clemens is a 43 year old female who presented with severe delayed postpartum hemorrhage to the ED.    Hospital Course   The patient's hospital course was remarkable for severe bleeding requiring immediate emergent suction curettage with multiple uterotonics and placement of a Kadi device. This was left in place for 8 hours with very little output, and then removed on POD 0 after she remained stable for 8 hours. She received PRBCs, FFP, and cryo intraoperatively. After the procedure,  she recovered as anticipated and experienced no post-operative complications. When she remained stable after monitoring for 8 hours after Kadi removal, she requested discharge to home. Her hemoglobin was low at 7.2, but she had no further symptoms, was ambulatory, and requested to go home. Voiding normally, tolerating regular diet. Pain is well controlled  ( for delivery at [facility]).      Shefali Munoz MD    Discharge Disposition   Discharged to home   Condition at discharge: Stable  Needs to follow up with her primary clinic by the end of the week for blood pressure check.    Pending Results   Final pathology results: Pending at time of discharge  These results will be followed up by Dr. Sanders   Unresulted Labs Ordered in the Past 30 Days of this Admission       Date and Time Order Name Status Description    10/31/2023  3:30 AM Surgical Pathology Exam In process     10/31/2023  2:09 AM Prepare pheresed platelets (unit) Preliminary     10/31/2023  2:03 AM Prepare plasma (unit) Preliminary     10/31/2023  2:03 AM Prepare plasma (unit) Preliminary     10/31/2023  2:03 AM Prepare plasma (unit) Preliminary     10/31/2023  2:03 AM Prepare plasma (unit) Preliminary     10/31/2023  2:03 AM Prepare red blood cells (unit) Preliminary     10/31/2023  2:03 AM Prepare red blood cells (unit) Preliminary     10/31/2023  1:26 AM Prepare red blood cells (unit) Preliminary     10/31/2023  1:26 AM Prepare red blood cells (unit) Preliminary     10/31/2023  1:10 AM Prepare plasma (unit) Preliminary     10/31/2023  1:10 AM Prepare plasma (unit) Preliminary     10/31/2023  1:10 AM Prepare red blood cells (unit) Preliminary     10/31/2023  1:10 AM Prepare red blood cells (unit) Preliminary     10/31/2023  1:09 AM Prepare pheresed platelets (unit) Preliminary             Primary Care Physician   Jemima Melgar        Consultations This Hospital Stay   None    Time Spent on this Encounter   I have spent less than 30 minutes on this discharge.    Discharge Orders      Call your surgeon's office if:    Your pain is not controlled by pain medication or pain suddenly increases.  You develop a fever greater than 101 and/or chills 24 hours after surgery.  You notice redness, swelling, or bad smelling drainage.   If you have a large amount of bleeding that does not stop.  If  you are unable to pass urine within 8 hours after surgery.  If you have vomiting that lasts more than a day.  If you have a skin reaction to tape or dressing (such as redness, itching, or rash at the surgery site).   If you are soaking a maxi pad every hour for more than two hours.     No driving or operating machinery    Do not drive or operate machinery for 24 hours after procedure or while still taking narcotics, whichever is longer.     No alcohol    NO ALCOHOL for 24 hours post procedure or while still taking narcotics, whichever is longer     Constipation management    Constipation (hard, dry bowel movements) is expected after surgery due to the combination of being less active, the anesthetic, and the opioid pain medication. You can do the following to help reduce constipation: ~ FLUIDS: Drink clear liquids (water or Gatorade), or juice (apple/prune). ~ DIET: Eat a fiber rich diet. ~ ACTIVITY: Get up and move around several times a day. Increase your activity as you are able. MEDICATIONS: Reduce the risk of constipation by starting medications before you are constipated. You can take Miralax (1 packet as directed) and/or a stool softener (Senokot 1-2 tablets 1-2 times a day). If you already have constipation and these medications are not working, you can get magnesium citrate and use as directed. If you continue to have constipation you can try an over the counter suppository or enema. Call your Surgeon Team if it has been greater than 3 days since your last bowel movement.     Pain after Surgery    Pain after surgery is normal and expected. You will have some amount of pain for several weeks after surgery. Your pain will improve with time. There are several things you can do to help reduce your pain including: rest, ice (for first 24 hours, then heat thereafter), and using pain medications as needed. Contact your Surgeon Team if you have pain that persists or worsens after surgery despite rest, ice/heat, and  taking your medication(s) as prescribed.     Medication Instructions: acetaminophen (TYLENOL)    If you were advised to take acetaminophen (TYLENOL) for pain management after surgery, it most effectively manages pain symptoms when it is taken on a schedule without missing doses (every four, six, or eight hours). Your Provider will prescribe a safe daily dose between 3000 - 4000 mg. Do NOT exceed this daily dose or take consistently for more than 5 days.     Medication Instructions: NSAID (ibuprofen)    If you were advised to take an anti-inflammatory medication for pain management to use in combination with acetaminophen (TYLENOL) and the narcotic pain medication, take this medication exactly as directed. You should only take one anti-inflammatory at a time. Some common anti-inflammatories include: ibuprofen (ADVIL, MOTRIN), naproxen (ALEVE, NAPROSYN), celecoxib (CELEBREX), and meloxicam (MOBIC). Take this medication with food and water. Do not take consistently for more than 5 days     Shower/Bathing - No restrictions, may shower after 24 hours    Shower/Bathing - No restrictions, may shower after 24 hours.     No lifting    No lifting over 15 pounds and no strenuous physical activity for 6 weeks.     May return to work (WITHOUT restrictions)    May return to work in as planned for maternity leave     Resume pre-procedure diet    Resume pre-procedure diet.     Discharge Medications   Current Discharge Medication List        START taking these medications    Details   !! acetaminophen (TYLENOL) 325 MG tablet Take 3 tablets (975 mg) by mouth every 6 hours as needed for mild pain  Qty: 50 tablet, Refills: 0    Associated Diagnoses: S/P dilation and curettage      !! ibuprofen (ADVIL/MOTRIN) 800 MG tablet Take 1 tablet (800 mg) by mouth every 6 hours as needed for other (mild and/or inflammatory pain)  Qty: 30 tablet, Refills: 0    Associated Diagnoses: S/P dilation and curettage      ondansetron (ZOFRAN ODT) 4 MG ODT  tab Take 1-2 tablets (4-8 mg) by mouth every 8 hours as needed for nausea  Qty: 4 tablet, Refills: 0    Associated Diagnoses: S/P dilation and curettage      !! oxyCODONE (ROXICODONE) 5 MG tablet Take 1-2 tablets (5-10 mg) by mouth every 4 hours as needed for moderate to severe pain  Qty: 12 tablet, Refills: 0    Associated Diagnoses: S/P dilation and curettage      !! senna-docusate (SENOKOT-S/PERICOLACE) 8.6-50 MG tablet Take 1-2 tablets by mouth 2 times daily  Qty: 30 tablet, Refills: 0    Associated Diagnoses: S/P dilation and curettage       !! - Potential duplicate medications found. Please discuss with provider.        CONTINUE these medications which have NOT CHANGED    Details   !! acetaminophen (TYLENOL) 325 MG tablet Take 3 tablets (975 mg) by mouth every 6 hours  Qty: 30 tablet, Refills: 0    Associated Diagnoses:  delivery delivered      !! ibuprofen (ADVIL/MOTRIN) 800 MG tablet Take 1 tablet (800 mg) by mouth every 8 hours as needed for moderate pain  Qty: 20 tablet, Refills: 0    Associated Diagnoses:  delivery delivered      levothyroxine (SYNTHROID/LEVOTHROID) 50 MCG tablet Take 50 mcg by mouth daily       !! oxyCODONE (ROXICODONE) 5 MG tablet Take 1 tablet (5 mg) by mouth every 6 hours as needed for breakthrough pain  Qty: 12 tablet, Refills: 0    Associated Diagnoses:  delivery delivered      Prenatal Vit-Fe Fumarate-FA (PRENATAL MULTIVITAMIN W/IRON) 27-0.8 MG tablet Take 1 tablet by mouth daily      !! senna-docusate (SENOKOT-S/PERICOLACE) 8.6-50 MG tablet Take 1 tablet by mouth daily as needed for constipation  Qty: 30 tablet, Refills: 0    Associated Diagnoses:  delivery delivered      Vitamin D3 (CHOLECALCIFEROL) 125 MCG (5000 UT) tablet Take 1 tablet by mouth daily       !! - Potential duplicate medications found. Please discuss with provider.        STOP taking these medications       metFORMIN (GLUCOPHAGE-XR) 500 MG 24 hr tablet Comments:   Reason for  Stopping:         pantoprazole (PROTONIX) 20 MG EC tablet Comments:   Reason for Stopping:             Allergies   No Known Allergies  Data   Most Recent 3 CBC's:  Recent Labs   Lab Test 10/31/23  0730 10/31/23  0326 10/31/23  0039 10/31/23  0037 10/21/23  0514 10/20/23  1140   WBC  --   --   --  10.7 12.0* 6.6   HGB 7.2* 8.0* 7.1* 7.7* 11.7 13.7   MCV  --   --   --  97 94 91   PLT  --   --   --  205 155 157      Most Recent 3 BMP's:  Recent Labs   Lab Test 10/31/23  0039 10/31/23  0037 10/31/23  0036 10/21/23  0514 10/20/23  1140    139  --  132* 139   POTASSIUM 3.8 4.0  --  4.7 4.4   CHLORIDE 109 110*  --  101 106   CO2  --  23  --  23 20*   BUN 19 21.0*  --  18.2 14.0   CR 0.7 0.70  --  0.78 0.66   ANIONGAP  --  6*  --  8 13   DAVID  --  7.2*  --  9.0 9.6   * 193* 169* 111* 74     Most Recent 2 LFT's:  Recent Labs   Lab Test 10/31/23  0037 10/21/23  0514   AST 12 23   ALT 17 12   ALKPHOS 80 111*   BILITOTAL <0.2 <0.2     Most Recent INR's and Anticoagulation Dosing History:  Anticoagulation Dose History          Latest Ref Rng & Units 4/10/2020 3/15/2021 10/31/2023   Recent Dosing and Labs   INR 0.85 - 1.15 1.00  0.96  1.21      Most Recent 3 Troponin's:  Recent Labs   Lab Test 03/15/21  1422 12/23/18  2215 03/11/18 2058   TROPI <0.015 <0.015 <0.015     Most Recent Cholesterol Panel:No lab results found.  Most Recent 6 Bacteria Isolates From Any Culture (See EPIC Reports for Culture Details):  Recent Labs   Lab Test 11/22/18  0820   CULT No growth     Most Recent TSH, T4 and A1c Labs:  Recent Labs   Lab Test 04/10/20  1626   TSH 1.20

## 2023-10-31 NOTE — BRIEF OP NOTE
[facility]    Brief Operative Note    Pre-operative diagnosis: Delayed postpartum hemorrhage [O72.2]  Post-operative diagnosis 42 y/o  POD #10 CS with pp hemorrhage, s/p ultrasound guided suction dilation and curettage    Procedure: DILATION AND CURETTAGE, UTERUS, USING SUCTION, WITH ULTRASOUND GUIDANCE, N/A - Vagina    Surgeon: Surgeon(s) and Role:     * Marilee Sandesr DO - Primary  Anesthesia: General   Estimated Blood Loss: 600 ml    Drains: Intrauterine Kadi suction device  Specimens:   ID Type Source Tests Collected by Time Destination   1 :  [employer] SURGICAL PATHOLOGY EXAM Marilee Sanders DO 10/31/2023  2:34 AM      Findings:   Uterus 20 weeks size with large clot, post procedure Endometrium < 4 mm visually, and uterus @ 12 week size, Kadi device placed  .  Complications: None.  Implants: * No implants in log *

## 2023-10-31 NOTE — PLAN OF CARE
Patient transferred over around 0445. Oriented to room, call light. Vitals stable. Capno on. Gutiérrez patent and draining. Kadi off and in place. No additional bleeding noted.  SCDs in place. Patient exhausted and resting.       Patients mobililty level scored using the bedside mobility assistance tool (BMAT). Patient is at a mobility level test number: 2. Mobility equipment used: Palmapvermat. Required assist of 2 staff members. Further use of BMAT scoring required.

## 2023-10-31 NOTE — H&P
"[facility] Labor and Delivery History and Physical    Amy Clemens MRN# [id number]   Age: 43 year old YOB: 1980     Date of Admission:  10/31/2023    Primary care provider: Jemima Melgar           Chief Complaint:   Amy Clemens is a 43 year old  female s/p POD #10 CS for twin gestation with delayed post partum hemorrhage   And hypotension now responding to blood transfusion, oxytocin, TXA and IVF. She has anemia with hemoglobin of 7, decreased from 11 which was her discharge hemoglobin post CS.             Pregnancy history:     OBSTETRIC HISTORY:    OB History    Para Term  AB Living   7 4 4 0 3 5   SAB IAB Ectopic Multiple Live Births   3 0 0 1 5      # Outcome Date GA Lbr King/2nd Weight Sex Delivery Anes PTL Lv   7A Term 10/20/23 37w4d  2.47 kg (5 lb 7.1 oz) F CS-LTranv  N MIKE      Birth Comments: delee for 8cc clear fluid      Name: Female-A Amy Clemens      Apgar1: 8  Apgar5: 9   7B Term 10/20/23 37w4d  2.16 kg (4 lb 12.2 oz) F CS-LTranv  N MIKE      Name: Female-SUREKHA Clemens      Apgar1: 8  Apgar5: 9   6 SAB      SAB      5 SAB      SAB      4 SAB      SAB      3 Term     F    MIKE   2 Term         MIKE   1 Term         MIKE       EDC: Estimated Date of Delivery: Data Unavailable    Prenatal Labs:   Lab Results   Component Value Date    ABO O 04/10/2020    RH Pos 04/10/2020    AS Negative 10/31/2023    CHPCRT Not Detected 2020    GCPCRT Not Detected 2020    HGB 7.1 (L) 10/31/2023       GBS Status:   No results found for: \"GBS\"    Active Problem List  Patient Active Problem List   Diagnosis    Tubal occlusion     delivery delivered    Gestational hypertension       Medication Prior to Admission  (Not in a hospital admission)  .        Maternal Past Medical History:     Past Medical History:   Diagnosis Date    Endometriosis     Gastroesophageal reflux disease     Thyroid disease     Tubal occlusion                        " Family History:   This patient has no significant family history            Social History:   This patient has no significant social history         Review of Systems:   CONSTITUTIONAL: NEGATIVE for fever, chills, change in weight  INTEGUMENTARY/SKIN: NEGATIVE for worrisome rashes, moles or lesions  EYES: NEGATIVE for vision changes or irritation  ENT/MOUTH: NEGATIVE for ear, mouth and throat problems  RESP: NEGATIVE for significant cough or SOB  BREAST: NEGATIVE for masses, tenderness or discharge  CV: NEGATIVE for chest pain, palpitations or peripheral edema  GI: NEGATIVE for nausea, abdominal pain, heartburn, or change in bowel habits  : NEGATIVE for frequency, dysuria, or hematuria  MUSCULOSKELETAL: NEGATIVE for significant arthralgias or myalgia  NEURO: NEGATIVE for weakness, dizziness or paresthesias  ENDOCRINE: NEGATIVE for temperature intolerance, skin/hair changes  HEME: NEGATIVE for bleeding problems  PSYCHIATRIC: NEGATIVE for changes in mood or affect          Physical Exam:   Vitals were reviewed  Patient Vitals for the past 8 hrs:   BP Pulse Resp SpO2   10/31/23 0110 -- 101 26 99 %   10/31/23 0105 (!) 147/80 109 15 100 %   10/31/23 0100 (!) 167/108 96 21 100 %   10/31/23 0055 127/71 80 17 96 %   10/31/23 0050 133/78 96 (!) 6 96 %   10/31/23 0045 (!) 145/62 84 23 100 %   10/31/23 0040 123/73 97 (!) 8 100 %   10/31/23 0039 116/72 74 22 100 %     Constitutional:   awake, alert, cooperative, no apparent distress, and appears stated age     Abdomen:   scars noted pfannenstiel incision     Genitounirinary:   External Genitalia:  General appearance; normal, Hair distribution; normal  Vagina:  General appearance normal, Estrogen effect normal, Abnormal findings: blood filling vaginal vault      Cervix: unable to see 2/2 bleeding              Assessment:   Amy Clemens is a 43 year old  female s/p POD #10 CS for twin gestation with delayed post partum hemorrhage   And hypotension now responding to  blood transfusion, oxytocin, TXA and IVF. She has anemia with hemoglobin of 7, decreased from 11 which was her discharge hemoglobin post CS.         Plan:   Recommend emergent suction dilation and curettage with ultrasound guidance.   Continue PRBC transfusion.     Marilee Sanders, DO

## 2024-04-21 ENCOUNTER — HEALTH MAINTENANCE LETTER (OUTPATIENT)
Age: 44
End: 2024-04-21

## 2024-11-17 ENCOUNTER — HEALTH MAINTENANCE LETTER (OUTPATIENT)
Age: 44
End: 2024-11-17

## 2025-05-11 ENCOUNTER — HEALTH MAINTENANCE LETTER (OUTPATIENT)
Age: 45
End: 2025-05-11

## 2025-05-15 ENCOUNTER — APPOINTMENT (OUTPATIENT)
Dept: GENERAL RADIOLOGY | Facility: CLINIC | Age: 45
End: 2025-05-15
Attending: EMERGENCY MEDICINE
Payer: COMMERCIAL

## 2025-05-15 ENCOUNTER — HOSPITAL ENCOUNTER (EMERGENCY)
Facility: CLINIC | Age: 45
Discharge: HOME | End: 2025-05-15
Attending: EMERGENCY MEDICINE
Payer: COMMERCIAL

## 2025-05-15 VITALS
OXYGEN SATURATION: 99 % | TEMPERATURE: 97.8 F | SYSTOLIC BLOOD PRESSURE: 119 MMHG | BODY MASS INDEX: 34.96 KG/M2 | RESPIRATION RATE: 13 BRPM | HEART RATE: 48 BPM | WEIGHT: 190 LBS | DIASTOLIC BLOOD PRESSURE: 72 MMHG | HEIGHT: 62 IN

## 2025-05-15 DIAGNOSIS — R42 LIGHTHEADEDNESS: ICD-10-CM

## 2025-05-15 LAB
ALBUMIN SERPL BCG-MCNC: 3.8 G/DL (ref 3.5–5.2)
ALP SERPL-CCNC: 89 U/L (ref 40–150)
ALT SERPL W P-5'-P-CCNC: 17 U/L (ref 0–50)
ANION GAP SERPL CALCULATED.3IONS-SCNC: 11 MMOL/L (ref 7–15)
AST SERPL W P-5'-P-CCNC: 18 U/L (ref 0–45)
ATRIAL RATE - MUSE: 56 BPM
BASOPHILS # BLD AUTO: 0 10E3/UL (ref 0–0.2)
BASOPHILS NFR BLD AUTO: 0 %
BILIRUB DIRECT SERPL-MCNC: <0.08 MG/DL (ref 0–0.3)
BILIRUB SERPL-MCNC: 0.2 MG/DL
BUN SERPL-MCNC: 14.8 MG/DL (ref 6–20)
CALCIUM SERPL-MCNC: 9 MG/DL (ref 8.8–10.4)
CHLORIDE SERPL-SCNC: 102 MMOL/L (ref 98–107)
CREAT SERPL-MCNC: 0.71 MG/DL (ref 0.51–0.95)
DIASTOLIC BLOOD PRESSURE - MUSE: NORMAL MMHG
EGFRCR SERPLBLD CKD-EPI 2021: >90 ML/MIN/1.73M2
EOSINOPHIL # BLD AUTO: 0.2 10E3/UL (ref 0–0.7)
EOSINOPHIL NFR BLD AUTO: 2 %
ERYTHROCYTE [DISTWIDTH] IN BLOOD BY AUTOMATED COUNT: 13.3 % (ref 10–15)
GLUCOSE SERPL-MCNC: 107 MG/DL (ref 70–99)
HCG SERPL QL: NEGATIVE
HCO3 SERPL-SCNC: 25 MMOL/L (ref 22–29)
HCT VFR BLD AUTO: 39.1 % (ref 35–47)
HGB BLD-MCNC: 12.9 G/DL (ref 11.7–15.7)
HOLD SPECIMEN: NORMAL
IMM GRANULOCYTES # BLD: 0 10E3/UL
IMM GRANULOCYTES NFR BLD: 0 %
INTERPRETATION ECG - MUSE: NORMAL
LIPASE SERPL-CCNC: 33 U/L (ref 13–60)
LYMPHOCYTES # BLD AUTO: 1.1 10E3/UL (ref 0.8–5.3)
LYMPHOCYTES NFR BLD AUTO: 15 %
MAGNESIUM SERPL-MCNC: 2 MG/DL (ref 1.7–2.3)
MCH RBC QN AUTO: 29.5 PG (ref 26.5–33)
MCHC RBC AUTO-ENTMCNC: 33 G/DL (ref 31.5–36.5)
MCV RBC AUTO: 90 FL (ref 78–100)
MONOCYTES # BLD AUTO: 0.5 10E3/UL (ref 0–1.3)
MONOCYTES NFR BLD AUTO: 7 %
NEUTROPHILS # BLD AUTO: 5.7 10E3/UL (ref 1.6–8.3)
NEUTROPHILS NFR BLD AUTO: 75 %
NRBC # BLD AUTO: 0 10E3/UL
NRBC BLD AUTO-RTO: 0 /100
P AXIS - MUSE: 50 DEGREES
PLATELET # BLD AUTO: 263 10E3/UL (ref 150–450)
POTASSIUM SERPL-SCNC: 3.9 MMOL/L (ref 3.4–5.3)
PR INTERVAL - MUSE: 186 MS
PROT SERPL-MCNC: 6.5 G/DL (ref 6.4–8.3)
QRS DURATION - MUSE: 76 MS
QT - MUSE: 404 MS
QTC - MUSE: 389 MS
R AXIS - MUSE: 10 DEGREES
RBC # BLD AUTO: 4.37 10E6/UL (ref 3.8–5.2)
SODIUM SERPL-SCNC: 138 MMOL/L (ref 135–145)
SYSTOLIC BLOOD PRESSURE - MUSE: NORMAL MMHG
T AXIS - MUSE: 22 DEGREES
TROPONIN T SERPL HS-MCNC: <6 NG/L
VENTRICULAR RATE- MUSE: 56 BPM
WBC # BLD AUTO: 7.5 10E3/UL (ref 4–11)

## 2025-05-15 PROCEDURE — 93005 ELECTROCARDIOGRAM TRACING: CPT

## 2025-05-15 PROCEDURE — 258N000003 HC RX IP 258 OP 636: Performed by: EMERGENCY MEDICINE

## 2025-05-15 PROCEDURE — 83735 ASSAY OF MAGNESIUM: CPT | Performed by: EMERGENCY MEDICINE

## 2025-05-15 PROCEDURE — 83690 ASSAY OF LIPASE: CPT | Performed by: EMERGENCY MEDICINE

## 2025-05-15 PROCEDURE — 96361 HYDRATE IV INFUSION ADD-ON: CPT

## 2025-05-15 PROCEDURE — 96374 THER/PROPH/DIAG INJ IV PUSH: CPT

## 2025-05-15 PROCEDURE — 250N000011 HC RX IP 250 OP 636: Performed by: EMERGENCY MEDICINE

## 2025-05-15 PROCEDURE — 84484 ASSAY OF TROPONIN QUANT: CPT | Performed by: EMERGENCY MEDICINE

## 2025-05-15 PROCEDURE — 36415 COLL VENOUS BLD VENIPUNCTURE: CPT | Performed by: EMERGENCY MEDICINE

## 2025-05-15 PROCEDURE — 85025 COMPLETE CBC W/AUTO DIFF WBC: CPT | Performed by: EMERGENCY MEDICINE

## 2025-05-15 PROCEDURE — 99285 EMERGENCY DEPT VISIT HI MDM: CPT | Mod: 25

## 2025-05-15 PROCEDURE — 84703 CHORIONIC GONADOTROPIN ASSAY: CPT | Performed by: EMERGENCY MEDICINE

## 2025-05-15 PROCEDURE — 80048 BASIC METABOLIC PNL TOTAL CA: CPT | Performed by: EMERGENCY MEDICINE

## 2025-05-15 PROCEDURE — 82248 BILIRUBIN DIRECT: CPT | Performed by: EMERGENCY MEDICINE

## 2025-05-15 PROCEDURE — 71046 X-RAY EXAM CHEST 2 VIEWS: CPT

## 2025-05-15 RX ORDER — ONDANSETRON 2 MG/ML
4 INJECTION INTRAMUSCULAR; INTRAVENOUS ONCE
Status: COMPLETED | OUTPATIENT
Start: 2025-05-15 | End: 2025-05-15

## 2025-05-15 RX ADMIN — SODIUM CHLORIDE 1000 ML: 0.9 INJECTION, SOLUTION INTRAVENOUS at 19:23

## 2025-05-15 RX ADMIN — SODIUM CHLORIDE 1000 ML: 0.9 INJECTION, SOLUTION INTRAVENOUS at 21:46

## 2025-05-15 RX ADMIN — FAMOTIDINE 20 MG: 10 INJECTION, SOLUTION INTRAVENOUS at 20:18

## 2025-05-15 ASSESSMENT — ACTIVITIES OF DAILY LIVING (ADL)
ADLS_ACUITY_SCORE: 53

## 2025-05-15 ASSESSMENT — COLUMBIA-SUICIDE SEVERITY RATING SCALE - C-SSRS
6. HAVE YOU EVER DONE ANYTHING, STARTED TO DO ANYTHING, OR PREPARED TO DO ANYTHING TO END YOUR LIFE?: NO
1. IN THE PAST MONTH, HAVE YOU WISHED YOU WERE DEAD OR WISHED YOU COULD GO TO SLEEP AND NOT WAKE UP?: NO
2. HAVE YOU ACTUALLY HAD ANY THOUGHTS OF KILLING YOURSELF IN THE PAST MONTH?: NO

## 2025-05-15 NOTE — ED TRIAGE NOTES
Pt arrives via EMS with dizziness that started at 1500 today. States currently on her period and has been having increased bleeding today. Has been having to change tampon every hour. Endorses increased dizziness when standing. Per ems when pt stands radial pulse is weak. States epigastric pain. Pt was given 325 of ASA and 4 mg zofran.      Triage Assessment (Adult)       Row Name 05/15/25 1360          Triage Assessment    Airway WDL WDL        Respiratory WDL    Respiratory WDL WDL        Cardiac WDL    Cardiac WDL WDL

## 2025-05-15 NOTE — ED NOTES
Bed: [facility]  Expected date:   Expected time:   Means of arrival:   Comments:   to [facility]

## 2025-05-15 NOTE — ED PROVIDER NOTES
"  Emergency Department Note      History of Present Illness     Chief Complaint   Dizziness      HPI   Amy Clemens is a 45 year old female who presents alone via EMS for evaluation of dizziness.  She was feeling well until approximately 4 hours prior to arrival when she developed lightheadedness and nausea.  At one point she thought she may syncopize so she called nurse triage and they wanted her to come to the emergency department.  She notices the lightheadedness primarily with position changes.  She reports similar symptoms in the past when she had postpartum hemorrhage.  She is currently menstruating and describes her period as heavier today than usual.  She describes a sensation of heartburn and central chest pressure.  She denies fever, cough, leg pain or swelling, recent travel, or recent surgery.  She has taken normal PO today.    Independent Historian   None    Review of External Notes   I reviewed physical exam from 2024, history of hypothyroidism.     Past Medical History     Medical History and Problem List   Endometriosis  Gastroesophageal reflux disease  Thyroid disease  Tubal occlusion    Medications   Synthroid     Surgical History     Cholecystectomy  Dilation and curettage, hysteroscopy x2  aparotomy for tubal reanastomosis  Tubal ligation    Physical Exam     Patient Vitals for the past 24 hrs:   BP Temp Temp src Pulse Resp SpO2 Height Weight   05/15/25 2215 119/72 -- -- (!) 48 13 99 % -- --   05/15/25 2200 134/72 -- -- 53 -- 99 % -- --   05/15/25 2145 130/83 97.8  F (36.6  C) Oral (!) 46 12 98 % -- --   05/15/25 2030 -- -- -- (!) 48 14 99 % -- --   05/15/25 2022 126/88 -- -- 52 17 97 % 1.575 m (5' 2\") 86.2 kg (190 lb)   05/15/25 1855 136/83 -- -- 62 18 100 % -- 88.5 kg (195 lb)     Lying Orthostatic  BP: 130/74  Pulse: 60 bpm  Sitting Orthostatic  BP: 119/81  Pulse: 65 bpm  Standing Orthostatic  BP: 138/88  Pulse: 72 bpm    Physical Exam  General: Well-developed and well-nourished. " Well appearing middle-aged  woman. Cooperative.  Head:  Atraumatic.  Eyes:  Conjunctivae, lids, and sclerae are normal.  ENT:    Normal nose. Moist mucous membranes.  Neck:  Supple. Normal range of motion.  CV:  Bradycardic rate and regular rhythm. Normal heart sounds with no murmurs, rubs, or gallops detected.  Resp:  No respiratory distress. Clear to auscultation bilaterally without decreased breath sounds, wheezing, rales, or rhonchi.  GI:  Soft. Non-distended. Non-tender.    MS:  Normal ROM. No bilateral lower extremity edema.  Skin:  Warm. Non-diaphoretic. No pallor.  Neuro:  Awake. A&Ox3. Normal strength.  Psych: Normal mood and affect. Normal speech.  Vitals reviewed.     Diagnostics     Lab Results   Labs Ordered and Resulted from Time of ED Arrival to Time of ED Departure   BASIC METABOLIC PANEL - Abnormal       Result Value    Sodium 138      Potassium 3.9      Chloride 102      Carbon Dioxide (CO2) 25      Anion Gap 11      Urea Nitrogen 14.8      Creatinine 0.71      GFR Estimate >90      Calcium 9.0      Glucose 107 (*)    TROPONIN T, HIGH SENSITIVITY - Normal    Troponin T, High Sensitivity <6     MAGNESIUM - Normal    Magnesium 2.0     HCG QUALITATIVE PREGNANCY - Normal    hCG Serum Qualitative Negative     HEPATIC FUNCTION PANEL - Normal    Protein Total 6.5      Albumin 3.8      Bilirubin Total 0.2      Alkaline Phosphatase 89      AST 18      ALT 17      Bilirubin Direct <0.08     LIPASE - Normal    Lipase 33     CBC WITH PLATELETS AND DIFFERENTIAL    WBC Count 7.5      RBC Count 4.37      Hemoglobin 12.9      Hematocrit 39.1      MCV 90      MCH 29.5      MCHC 33.0      RDW 13.3      Platelet Count 263      % Neutrophils 75      % Lymphocytes 15      % Monocytes 7      % Eosinophils 2      % Basophils 0      % Immature Granulocytes 0      NRBCs per 100 WBC 0      Absolute Neutrophils 5.7      Absolute Lymphocytes 1.1      Absolute Monocytes 0.5      Absolute Eosinophils 0.2      Absolute  Basophils 0.0      Absolute Immature Granulocytes 0.0      Absolute NRBCs 0.0         Imaging   XR Chest 2 Views   Final Result   IMPRESSION:       Lungs are clear. No focal airspace opacities, pleural effusions, or pneumothorax. Normal pulmonary vascularity.      Cardiac size is within normal limits.        EKG  Indication: dizziness and lightheadedness, near syncope  Time:   Rate 56 bpm. NE interval 186. QRS duration 76. QT/QTc 404/389.   Sinus bradycardia  Artifact V2   No acute ST changes.  No significant change as compared to prior, dated 3/15/21.     Independent Interpretation   CXR: No pneumothorax, infiltrate, or pleural effusion.    ED Course      Medications Administered   Medications   sodium chloride 0.9% BOLUS 1,000 mL (0 mLs Intravenous Stopped 5/15/25 2229)   ondansetron (ZOFRAN) injection 4 mg (4 mg Intravenous Not Given 5/15/25 2146)   famotidine (PEPCID) injection 20 mg (20 mg Intravenous $Given 5/15/25 2018)   sodium chloride 0.9% BOLUS 1,000 mL (0 mLs Intravenous Stopped 5/15/25 2229)     ED Course   ED Course as of 05/15/25 2313   Thu May 15, 2025   1905 I obtained history and examined the patient as noted above    2219 I reevaluated the patient who is feeling improved.  Her  is at bedside.  She is comfortable with discharge.     Additional Documentation  Supportive .  Has children.  Has a primary care provider.    Medical Decision Making / Diagnosis   PERC Rule for risk stratifying PE to low risk (calculator)  Background  Risk stratifies patients to low risk of PE if all 8 criteria are present including age <50, heart rate <100, O2 Sat >94%, no unilateral leg edema, no hemoptysis, no recent surgery or trauma, no prior VTE, and no hormone use.  Data  45 year old  Tubal occlusion;  delivery delivered; Gestational hypertension; and Delayed postpartum hemorrhage on their problem list.   has a past surgical history that includes Cholecystectomy; Dilation and curettage,  "operative hysteroscopy with morcellator, combined (N/A, 2019); Dilation and curettage, operative hysteroscopy with morcellator, combined (N/A, 2020); tubal ligation (); Dilation and curettage, operative hysteroscopy with morcellator, combined (N/A, 2020); Laparoscopy diagnostic (gyn) (N/A, 3/18/2021); Tuboplasty reanastomosis (N/A, 3/18/2021);  section (N/A, 10/20/2023); and Dilation and curettage suction with ultrasound guidance (N/A, 10/31/2023).  Pulse: 62  SpO2: 100 %  Criteria   Of  8 possible items (all criteria must be present):  Age <50 years  Heart rate <100 bpm  Oxygen Saturation >94%  No unilateral leg swelling  No hemoptysis  No surgery or trauma within 4 weeks  No prior DVT or PE  No hormone use (oral, transdermal and intravaginal estrogens)  Interpretation  All eight criteria are met AND low clinical PE suspicion: No further evaluation for PE required    BABATUNDE Reyes is a 45 year old woman presenting with about 4 hours of lightheadedness and nausea with what sounds to be a near syncopal episode and \"heartburn\".  She is well-appearing on arrival.  She is bradycardic.  Orthostatics were obtained and are negative.  EKG is reassuring without ischemic changes.  Troponin is undetectable.  Laboratory studies are reassuring including hemoglobin of 12.9.  She is PERC negative essentially ruling out pulmonary embolism.  I did obtain chest x-ray given her report of chest discomfort though this is reassuring.    She was treated with IV fluids and Pepcid.  She ambulated without difficulty but still had some residual lightheadedness.  For this she was given a second liter of IV fluids.  On repeat evaluation she is feeling improved and is comfortable with discharge with her .  I am uncertain why she had this lightheadedness today and have encouraged her to return should she have worsening or new concerns.  Otherwise she should rest and drink lots of fluids with primary care follow-up.  " All questions answered.    Disposition   The patient was discharged.     Diagnosis     ICD-10-CM    1. Lightheadedness  R42            Discharge Medications   Discharge Medication List as of 5/15/2025 10:32 PM            Scribe Disclosure:  I, Shirakee Luisito, am serving as a scribe at 9:16 PM on 5/15/2025 to document services personally performed by Gaby Farrell MD based on my observations and the provider's statements to me.        Gaby Farrell MD  05/15/25 [id number]

## 2025-05-16 LAB
ATRIAL RATE - MUSE: 56 BPM
DIASTOLIC BLOOD PRESSURE - MUSE: NORMAL MMHG
INTERPRETATION ECG - MUSE: NORMAL
P AXIS - MUSE: 50 DEGREES
PR INTERVAL - MUSE: 186 MS
QRS DURATION - MUSE: 76 MS
QT - MUSE: 404 MS
QTC - MUSE: 389 MS
R AXIS - MUSE: 10 DEGREES
SYSTOLIC BLOOD PRESSURE - MUSE: NORMAL MMHG
T AXIS - MUSE: 22 DEGREES
VENTRICULAR RATE- MUSE: 56 BPM

## 2025-05-16 NOTE — ED NOTES
Pt able to ambulate to restroom with minimal help, pt was assisted by daughter. Endorses dizziness that has slightly improved after fluids.

## 2025-05-16 NOTE — DISCHARGE INSTRUCTIONS
Lots of rest and fluids.  Follow-up with your primary care provider to ensure you are doing well.  Return immediately if you have recurrent severe symptoms or new concerns of any kind.

## (undated) DEVICE — NDL SPINAL 22GA 3.5" QUINCKE 405181

## (undated) DEVICE — KIT ABDOMINAL HYST SMA15AHFSM

## (undated) DEVICE — BLADE CLIPPER DISP 4406

## (undated) DEVICE — CUSTOM PACK C-SECTION LHE

## (undated) DEVICE — Device

## (undated) DEVICE — GLOVE PROTEXIS W/NEU-THERA 8.0  2D73TE80

## (undated) DEVICE — WIPES FOLEY CARE SURESTEP PROVON DFC100

## (undated) DEVICE — TUBING SUCTION VACUUM COLLECTION 6FT 610

## (undated) DEVICE — GLOVE PROTEXIS POWDER FREE SMT 6.5  2D72PT65X

## (undated) DEVICE — LINEN FULL SHEET 5511

## (undated) DEVICE — ENDO SCOPE WARMER LF TM500

## (undated) DEVICE — DRSG BANDAID 1X3" FABRIC LATEX FREE

## (undated) DEVICE — LINEN TOWEL PACK X5 5464

## (undated) DEVICE — SUCTION KIWI VAC PRO CUP DELIVERY SYSTEM VAC-6000S

## (undated) DEVICE — DEVICE TISSUE REMOVAL HYSTEROSCOPIC MYOSURE LITE 30-401LITE

## (undated) DEVICE — SPECIMEN TRAP VACUUM SUCTION 003984-901

## (undated) DEVICE — SOL NACL 0.9% IRRIG 1000ML BOTTLE 2F7124

## (undated) DEVICE — SEAL SET MYOSURE ROD LENS SCOPE SINGLE USE 40-902

## (undated) DEVICE — SUCTION CANISTER BEMIS HI FLOW 006772-901

## (undated) DEVICE — PREP SKIN SCRUB TRAY 4461A

## (undated) DEVICE — BASIN SET MINOR DISP

## (undated) DEVICE — SU VICRYL 0 CT 36" J358H

## (undated) DEVICE — CATH INTERMITTENT CLEAN-CATH FEMALE 14FR 6" VINYL LF 420614

## (undated) DEVICE — LINEN HALF SHEET 5512

## (undated) DEVICE — SU PLAIN 2-0 CT 27" 853H

## (undated) DEVICE — BAG CLEAR TRASH 1.3M 39X33" P4040C

## (undated) DEVICE — DRAPE LAVH/LAPAROSCOPY W/POUCH 29474

## (undated) DEVICE — PAD CHUX UNDERPAD 30X36" P3036C

## (undated) DEVICE — PACK MINOR SINGLE BASIN SSK3001

## (undated) DEVICE — TUBING SYS AQUILEX BLUE INFLOW AQL-110 YLW OUTFLOW AQL-111

## (undated) DEVICE — SYSTEM HEMORRHAGE CNTRL JADA SYS 2.0 VAC-INDUCED 1047196

## (undated) DEVICE — GLOVE PROTEXIS MICRO 7.5  2D73PM75

## (undated) DEVICE — SOL NACL 0.9% IRRIG 3000ML BAG 2B7477

## (undated) DEVICE — SYR 50ML LL W/O NDL 309653

## (undated) DEVICE — SOL WATER IRRIG 1000ML BOTTLE 2F7114

## (undated) DEVICE — SUTURE VICRYL+ 3-0 27IN CT-1 UND VCP258H

## (undated) DEVICE — PAD CHUX UNDERPAD 23X24" 7136

## (undated) DEVICE — BLADE KNIFE SURG 15 371115

## (undated) DEVICE — PACK MINOR LITHOTOMY RIDGES

## (undated) DEVICE — GLOVE BIOGEL PI MICRO INDICATOR UNDERGLOVE SZ 6.5 48965

## (undated) DEVICE — DRSG TELFA 3X8" 1238

## (undated) DEVICE — NDL BLUNT 23GA 1"

## (undated) DEVICE — LINEN DRAPE 54X72" 5467

## (undated) DEVICE — TUBING IRRIG CYSTO/BLADDER SET 81" LF 2C4040

## (undated) DEVICE — FILTER BERKLEY SAFETOUCH

## (undated) DEVICE — CATH TRAY FOLEY SURESTEP 16FR WDRAIN BAG STLK LATEX A300316A

## (undated) DEVICE — SYR 10ML FINGER CONTROL W/O NDL 309695

## (undated) DEVICE — LINEN TOWEL PACK X10 5473

## (undated) DEVICE — SUTURE PDS 0 60IN CTX+ LOOPED PDP990G

## (undated) DEVICE — SU VICRYL 2-0 CT 36" J357H

## (undated) DEVICE — SUTURE VICRYL+ 0 36IN CTX VIOLET VCP978H

## (undated) DEVICE — GLOVE BIOGEL PI ULTRATOUCH SZ 6.5 41165

## (undated) DEVICE — UTERINE MANIPULATOR RUMI 5.1MMX6CM UML516

## (undated) DEVICE — SOL RINGERS LACTATED 1000ML BAG 2B2324X

## (undated) DEVICE — SURGICEL POWDER ABSORBABLE HEMOSTAT 3GM 3013SP

## (undated) DEVICE — SPECIMEN BAG BEMIS HI FLOW SUCTION WHITE SOCK 533810

## (undated) DEVICE — DRSG STERI STRIP 1/2X4" R1547

## (undated) DEVICE — PAD INSERT MED PEACH 14X6.5 62550

## (undated) DEVICE — NDL INSUFFLATION 120MM VERRES 172015

## (undated) DEVICE — DRAPE UNDER BUTTOCK 89415

## (undated) DEVICE — GLOVE PROTEXIS BLUE W/NEU-THERA 7.0  2D73EB70

## (undated) DEVICE — SUCTION CANNULA UTERINE 10MM CVD 022110-10

## (undated) DEVICE — PREP CHLORAPREP 26ML TINTED HI-LITE ORANGE 930815

## (undated) DEVICE — SU MONOCRYL+ 4-0 18IN PS2 UND MCP496G

## (undated) DEVICE — GLOVE PROTEXIS BLUE W/NEU-THERA 6.0  2D73EB60

## (undated) DEVICE — SU VICRYL 4-0 PS-2 18" UND J496H

## (undated) DEVICE — ESU HOLSTER PLASTIC DISP E2400

## (undated) DEVICE — GLOVE PROTEXIS W/NEU-THERA 5.5  2D73TE55

## (undated) DEVICE — TUBING IV EXTENSION SET 34"

## (undated) DEVICE — SU VICRYL 6-0 S-14DA 18" UND J670G

## (undated) DEVICE — SU MONOCRYL 0 CT-1 36" UND Y946H

## (undated) DEVICE — PLATE GROUNDING ADULT W/CORD 9165L

## (undated) DEVICE — NDL 22GA 1.5"

## (undated) DEVICE — PACK SET-UP STD 9102

## (undated) DEVICE — TUBE NASOGASTRIC NEONATE PURPLE KANGAROO PVC 5FR 16" 460802

## (undated) DEVICE — WRAPPER STERILE QC 200 18X18

## (undated) DEVICE — SUCTION CANNULA UTERINE 14MM CVD 21593A

## (undated) DEVICE — SYR 05ML SLIP TIP W/O NDL 309647

## (undated) DEVICE — SUCTION MANIFOLD NEPTUNE 2 SYS 1 PORT 702-025-000

## (undated) DEVICE — SU VICRYL 5-0 RB-1 27" J303H

## (undated) DEVICE — DRESSING MEPILEX BORDER POST-OP 4X10

## (undated) DEVICE — SOL BENZOIN 0.5OZ

## (undated) DEVICE — ESU CORD BIPOLAR 12' E0512

## (undated) RX ORDER — DEXAMETHASONE SODIUM PHOSPHATE 4 MG/ML
INJECTION, SOLUTION INTRA-ARTICULAR; INTRALESIONAL; INTRAMUSCULAR; INTRAVENOUS; SOFT TISSUE
Status: DISPENSED
Start: 2021-03-18

## (undated) RX ORDER — KETOROLAC TROMETHAMINE 30 MG/ML
INJECTION, SOLUTION INTRAMUSCULAR; INTRAVENOUS
Status: DISPENSED
Start: 2020-09-29

## (undated) RX ORDER — PROPOFOL 10 MG/ML
INJECTION, EMULSION INTRAVENOUS
Status: DISPENSED
Start: 2021-03-18

## (undated) RX ORDER — KETAMINE HCL IN 0.9 % NACL 50 MG/5 ML
SYRINGE (ML) INTRAVENOUS
Status: DISPENSED
Start: 2019-02-07

## (undated) RX ORDER — PROPOFOL 10 MG/ML
INJECTION, EMULSION INTRAVENOUS
Status: DISPENSED
Start: 2023-10-31

## (undated) RX ORDER — ACETAMINOPHEN 325 MG/1
TABLET ORAL
Status: DISPENSED
Start: 2020-09-29

## (undated) RX ORDER — KETAMINE HCL IN 0.9 % NACL 50 MG/5 ML
SYRINGE (ML) INTRAVENOUS
Status: DISPENSED
Start: 2020-01-13

## (undated) RX ORDER — ONDANSETRON 2 MG/ML
INJECTION INTRAMUSCULAR; INTRAVENOUS
Status: DISPENSED
Start: 2021-03-18

## (undated) RX ORDER — LIDOCAINE HYDROCHLORIDE 10 MG/ML
INJECTION, SOLUTION EPIDURAL; INFILTRATION; INTRACAUDAL; PERINEURAL
Status: DISPENSED
Start: 2020-09-29

## (undated) RX ORDER — CEFAZOLIN SODIUM 1 G/3ML
INJECTION, POWDER, FOR SOLUTION INTRAMUSCULAR; INTRAVENOUS
Status: DISPENSED
Start: 2023-10-31

## (undated) RX ORDER — FENTANYL CITRATE 50 UG/ML
INJECTION, SOLUTION INTRAMUSCULAR; INTRAVENOUS
Status: DISPENSED
Start: 2020-09-29

## (undated) RX ORDER — PROPOFOL 10 MG/ML
INJECTION, EMULSION INTRAVENOUS
Status: DISPENSED
Start: 2020-01-13

## (undated) RX ORDER — FENTANYL CITRATE 50 UG/ML
INJECTION, SOLUTION INTRAMUSCULAR; INTRAVENOUS
Status: DISPENSED
Start: 2023-10-31

## (undated) RX ORDER — FENTANYL CITRATE 50 UG/ML
INJECTION, SOLUTION INTRAMUSCULAR; INTRAVENOUS
Status: DISPENSED
Start: 2020-01-13

## (undated) RX ORDER — ONDANSETRON 2 MG/ML
INJECTION INTRAMUSCULAR; INTRAVENOUS
Status: DISPENSED
Start: 2023-10-31

## (undated) RX ORDER — FENTANYL CITRATE 0.05 MG/ML
INJECTION, SOLUTION INTRAMUSCULAR; INTRAVENOUS
Status: DISPENSED
Start: 2021-03-18

## (undated) RX ORDER — TRANEXAMIC ACID 10 MG/ML
INJECTION, SOLUTION INTRAVENOUS
Status: DISPENSED
Start: 2023-10-31

## (undated) RX ORDER — GLYCOPYRROLATE 0.2 MG/ML
INJECTION INTRAMUSCULAR; INTRAVENOUS
Status: DISPENSED
Start: 2020-01-13

## (undated) RX ORDER — IBUPROFEN 600 MG/1
TABLET, FILM COATED ORAL
Status: DISPENSED
Start: 2019-02-07

## (undated) RX ORDER — HYDROMORPHONE HYDROCHLORIDE 1 MG/ML
INJECTION, SOLUTION INTRAMUSCULAR; INTRAVENOUS; SUBCUTANEOUS
Status: DISPENSED
Start: 2019-02-07

## (undated) RX ORDER — DEXAMETHASONE SODIUM PHOSPHATE 4 MG/ML
INJECTION, SOLUTION INTRA-ARTICULAR; INTRALESIONAL; INTRAMUSCULAR; INTRAVENOUS; SOFT TISSUE
Status: DISPENSED
Start: 2020-01-13

## (undated) RX ORDER — ACETAMINOPHEN 325 MG/1
TABLET ORAL
Status: DISPENSED
Start: 2021-03-18

## (undated) RX ORDER — TRANEXAMIC ACID 100 MG/ML
INJECTION, SOLUTION INTRAVENOUS
Status: DISPENSED
Start: 2023-10-20

## (undated) RX ORDER — HYDROMORPHONE HYDROCHLORIDE 1 MG/ML
INJECTION, SOLUTION INTRAMUSCULAR; INTRAVENOUS; SUBCUTANEOUS
Status: DISPENSED
Start: 2021-03-18

## (undated) RX ORDER — ONDANSETRON 2 MG/ML
INJECTION INTRAMUSCULAR; INTRAVENOUS
Status: DISPENSED
Start: 2020-09-29

## (undated) RX ORDER — FENTANYL CITRATE 50 UG/ML
INJECTION, SOLUTION INTRAMUSCULAR; INTRAVENOUS
Status: DISPENSED
Start: 2019-02-07

## (undated) RX ORDER — ONDANSETRON 2 MG/ML
INJECTION INTRAMUSCULAR; INTRAVENOUS
Status: DISPENSED
Start: 2023-10-20

## (undated) RX ORDER — KETOROLAC TROMETHAMINE 30 MG/ML
INJECTION, SOLUTION INTRAMUSCULAR; INTRAVENOUS
Status: DISPENSED
Start: 2020-01-13

## (undated) RX ORDER — CEFAZOLIN SODIUM 2 G/100ML
INJECTION, SOLUTION INTRAVENOUS
Status: DISPENSED
Start: 2021-03-18

## (undated) RX ORDER — KETOROLAC TROMETHAMINE 30 MG/ML
INJECTION, SOLUTION INTRAMUSCULAR; INTRAVENOUS
Status: DISPENSED
Start: 2023-10-20

## (undated) RX ORDER — LIDOCAINE HYDROCHLORIDE 20 MG/ML
INJECTION, SOLUTION EPIDURAL; INFILTRATION; INTRACAUDAL; PERINEURAL
Status: DISPENSED
Start: 2021-03-18

## (undated) RX ORDER — FENTANYL CITRATE-0.9 % NACL/PF 10 MCG/ML
PLASTIC BAG, INJECTION (ML) INTRAVENOUS
Status: DISPENSED
Start: 2023-10-31

## (undated) RX ORDER — PROPOFOL 10 MG/ML
INJECTION, EMULSION INTRAVENOUS
Status: DISPENSED
Start: 2020-09-29

## (undated) RX ORDER — KETOROLAC TROMETHAMINE 30 MG/ML
INJECTION, SOLUTION INTRAMUSCULAR; INTRAVENOUS
Status: DISPENSED
Start: 2021-03-18

## (undated) RX ORDER — DEXAMETHASONE SODIUM PHOSPHATE 4 MG/ML
INJECTION, SOLUTION INTRA-ARTICULAR; INTRALESIONAL; INTRAMUSCULAR; INTRAVENOUS; SOFT TISSUE
Status: DISPENSED
Start: 2020-09-29

## (undated) RX ORDER — DEXAMETHASONE SODIUM PHOSPHATE 4 MG/ML
INJECTION, SOLUTION INTRA-ARTICULAR; INTRALESIONAL; INTRAMUSCULAR; INTRAVENOUS; SOFT TISSUE
Status: DISPENSED
Start: 2023-10-31

## (undated) RX ORDER — FENTANYL CITRATE 50 UG/ML
INJECTION, SOLUTION INTRAMUSCULAR; INTRAVENOUS
Status: DISPENSED
Start: 2021-03-18

## (undated) RX ORDER — ONDANSETRON 2 MG/ML
INJECTION INTRAMUSCULAR; INTRAVENOUS
Status: DISPENSED
Start: 2019-02-07

## (undated) RX ORDER — LIDOCAINE HYDROCHLORIDE 10 MG/ML
INJECTION, SOLUTION EPIDURAL; INFILTRATION; INTRACAUDAL; PERINEURAL
Status: DISPENSED
Start: 2023-10-31

## (undated) RX ORDER — LIDOCAINE HYDROCHLORIDE 10 MG/ML
INJECTION, SOLUTION EPIDURAL; INFILTRATION; INTRACAUDAL; PERINEURAL
Status: DISPENSED
Start: 2020-01-13

## (undated) RX ORDER — MORPHINE SULFATE 1 MG/ML
INJECTION, SOLUTION EPIDURAL; INTRATHECAL; INTRAVENOUS
Status: DISPENSED
Start: 2023-10-20

## (undated) RX ORDER — ONDANSETRON 2 MG/ML
INJECTION INTRAMUSCULAR; INTRAVENOUS
Status: DISPENSED
Start: 2020-01-13